# Patient Record
Sex: MALE | Race: WHITE | Employment: OTHER | URBAN - METROPOLITAN AREA
[De-identification: names, ages, dates, MRNs, and addresses within clinical notes are randomized per-mention and may not be internally consistent; named-entity substitution may affect disease eponyms.]

---

## 2017-06-22 ENCOUNTER — OFFICE VISIT (OUTPATIENT)
Dept: CARDIOLOGY CLINIC | Age: 82
End: 2017-06-22

## 2017-06-22 VITALS
HEIGHT: 67 IN | DIASTOLIC BLOOD PRESSURE: 88 MMHG | HEART RATE: 62 BPM | OXYGEN SATURATION: 98 % | WEIGHT: 225.8 LBS | BODY MASS INDEX: 35.44 KG/M2 | SYSTOLIC BLOOD PRESSURE: 140 MMHG | RESPIRATION RATE: 18 BRPM

## 2017-06-22 DIAGNOSIS — E78.00 HIGH CHOLESTEROL: Chronic | ICD-10-CM

## 2017-06-22 DIAGNOSIS — I48.0 PAF (PAROXYSMAL ATRIAL FIBRILLATION) (HCC): Primary | Chronic | ICD-10-CM

## 2017-06-22 DIAGNOSIS — I44.7 LBBB (LEFT BUNDLE BRANCH BLOCK): ICD-10-CM

## 2017-06-22 DIAGNOSIS — I10 HTN (HYPERTENSION), BENIGN: Chronic | ICD-10-CM

## 2017-06-22 NOTE — PROGRESS NOTES
Bryce Soler MD    [id number] [facility] [place], [address] [facility]    Office [phone],[facility] [phone]  Pager [phone]    Lois Postal is a 80 y.o. male is here for f/u visit - Afib    Primary care physician:  Jyoti Perla MD    Patient Active Problem List   Diagnosis Code    Unspecified hypothyroidism E03.9    HTN (hypertension), benign I10    Esophageal reflux K21.9    CAD (coronary artery disease) I25.10    Cardiomyopathy ([address] I42.9    GI bleed K92.2    Chronic anticoagulation Z79.01    PAF (paroxysmal atrial fibrillation) ([facility] I48.0    LBBB (left bundle branch block) I44.7    High cholesterol E78.00       Chief complaint:  Chief Complaint   Patient presents with    Irregular Heart Beat     afib       Assessment:    PAF - now Sinus Jericho Bartholomew - was on amiodaraone - now off it ( office visit 9/18/14)- \"pt not sure - thought he was supposed to come off it\". Was not restarted. He was on Eliquis but had a GI bleed in October 2016 (please see discussion below) and is now on aspirin. LBBB ( old)  Hx of NSVT  Non obstructive CAD by cath 5/2014  Cardiomyopathy -EF50%/DD- grade 1/ non ischemic - clinically euvolemic  Chronic dizziness - hx of BPV  \"Cool extremeties- admn 5/5/14 - seen by Dr Roberto Carlos Eagle 5/12/14 - SERAFIN - at ankle (Nml SERAFIN 1.3 bilat with PVR) - neg u/s for DVT and VQ scan\"  (Hx of Anemia w/u 5/5/14 - Dr Feroz Jean - Mild antral gastritis/ Mild sigmoid diverticulosis and small internal hemorrhoids, otherwise mucosa within normal. - per pt -anemia resolved-recurrent GI bleed October 2016. As per the discussion during the admission, patient did not want any further workup for his diverticulitis history/repeat procedures as part of the gastroenterologist note and it was recommended that because of the 25% risk of rebleeding if Eliquis was restarted, patient would be on aspirin alone.)            Plan:     Doing well. He will call me if he has any issues.   On ASA/ B Blocker/ACEI /statin  Daily wts  Aggressive CV risk factor modification  Lipids perPCP  F/u appt in 6 mths        I appreciate the opportunity to be involved in Mr. Ríos. See note below for details. Please do not hesitate to contact us with questions or concerns. Lavell Izaguirre MD    Cardiac Testing/ Procedures: A. Cardiac Cath/PCI: 5/9/14  L Main: MLI  LAD: Med to Large prox ; Mid and distal LAD - small to med; At D1 - tandem  50% lesions; D1 - small to med - MLI  LCflex: Large; MLI; OM1;OM2 - MLI  RCA: Subselective inj ( WRP catheter) - tortuous; MLI  LVEDP: 10  LVEF: 40%  No significant gradient across aortic valve. RHC findings:  RAP= 2 mmHg  RVSP= 31 mmHg  PAP= 15 mmHg  PCWP= 6 mmHg  CO= 5.61 l/min  CI=2.64  Closure Device: Manual          B.ECHO/KIRT:  12/8/15 Left ventricle: Systolic function was normal by EF (biplane method of  disks). Ejection fraction was estimated to be 50 %. There were no regional  wall motion abnormalities. Doppler parameters were consistent with  abnormal left ventricular relaxation (grade 1 diastolic dysfunction). Left atrium: The atrium was moderately dilated. Mitral valve: There was mild annular calcification. There was mild  regurgitation. Aortic valve: Normal valve structure. Leaflets exhibited mild  calcification and sclerosis. Tricuspid valve: There was moderate regurgitation. 9/12/14 Left ventricle: Systolic function was mildly reduced. Ejection fraction  was estimated to be 50 %. There was moderate diffuse hypokinesis. There  was severe hypokinesis of the basal-mid inferior wall(s). Doppler  parameters were consistent with abnormal left ventricular relaxation  (grade 1 diastolic dysfunction). Left atrium: The atrium was moderately to markedly dilated. Right atrium: The atrium was dilated. Mitral valve: There was mild annular calcification. There was mild  regurgitation. Tricuspid valve: There was mild regurgitation.         5/6/14 Left ventricle: Size was normal. Systolic function was markedly reduced. Ejection fraction was estimated to be 25 %. There was severe diffuse  hypokinesis with distinct regional wall motion abnormalities. Doppler  parameters were consistent with abnormal left ventricular relaxation  (grade 1 diastolic dysfunction). Right ventricle: The size was normal. Systolic function was normal.    Left atrium: The atrium was mildly dilated. Mitral valve: There was moderate annular calcification. There was mild  regurgitation. Regurgitation grade was 1+ on a scale of 0 to 4+. C.StressNuclear/Stress ECHO/Stress test:    D.Vascular:    E. EP:    F. Miscellaneous:    Subjective:  Yesi Gillis is a 80 y.o. male who returns for follow up visit. Patient has been doing well since his GI bleed in October 2016 when his Eliquis was stopped. As per the discussion during the admission, patient did not want any further workup for his diverticulitis history/repeat procedures as part of the gastroenterologist note and it was recommended that because of the 25% risk of rebleeding if Eliquis was restarted, patient would be on aspirin alone. Patient states that he has been doing well. No chest pain, dyspnea, melena, hematochezia, hematuria. He is now at [facility] and is in a cottage. His son owns a [employer] dealerships  and checks on him. Patient was  for 60+ years and  lost his wife in 2015. Yesi Gillis is a 66 y.o. male with a hx of htn, dyslipidemia, hypothyroidism, Gout and prostate cancer s/p prostatectomy admitted for worsened lethargy and dizziness to [facility] on 5/5/14. Ramona Boss Hx of  afib with RVR the pt was started on diltiazem but is remaining in the 120-130 range. EKG showed a LBBB in addition to afib. Stress test with Myoview on 1/2013 showed an EF of 62% with no evidence of ischemia. Hx of prior LBBB. Hx of chronic dizziness - BPV. (not assoc with afib).      ECHO showed EF of 25% ( clicnically well compensated) - underwent RHC/LHC - non obstructive CAD. Also had  NSVT. Seen by  [facility]. Started on Amiodarone and Eliquis. During office visit 9/2014 - was not taking amiodarone. Was in sinus erica and it was not restarted.)                   ROS:  (bold if positive, if negative)             Medications before admission:    Current Outpatient Prescriptions   Medication Sig Dispense    carvedilol (COREG) 25 mg tablet TAKE ONE TABLET BY MOUTH TWICE A DAY WITH MEALS  60 Tab    lisinopril (PRINIVIL, ZESTRIL) 2.5 mg tablet TAKE ONE TABLET BY MOUTH ONCE A DAY  90 Tab    ELIQUIS 5 mg tablet TAKE ONE TABLET BY MOUTH TWICE A DAY  30 Tab    levothyroxine (LEVOTHROID) 50 mcg tablet Take 50 mcg by mouth Daily (before breakfast).  calcium carbonate (TUMS) 200 mg calcium (500 mg) chew Take 2 Tabs by mouth daily.  omeprazole (PRILOSEC) 20 mg capsule Take 20 mg by mouth daily.  loratadine (CLARITIN) 10 mg tablet Take 10 mg by mouth daily.  polyethylene glycol (MIRALAX) 17 gram packet Take 17 g by mouth daily.  psyllium (METAMUCIL) packet Take 1 Packet by mouth daily.  memantine (NAMENDA) 10 mg tablet Take 10 mg by mouth two (2) times a day.  simvastatin (ZOCOR) 20 mg tablet Take 20 mg by mouth nightly.  aspirin 81 mg tablet Take 81 mg by mouth daily.  MULTIVITAMIN PO Take 1 Tab by mouth daily.     90 Tab    zolpidem (AMBIEN) 10 mg tablet Take 10 mg by mouth nightly. No current facility-administered medications for this visit. Physical Exam:  Visit Vitals    /88 (BP 1 Location: Left arm)    Pulse 62    Resp 18    Ht 5' 7\" (1.702 m)    Wt 225 lb 12.8 oz (102.4 kg)    SpO2 98%    BMI 35.37 kg/m2          Gen: Well-developed, well-nourished, in no acute distress  Neck: Supple,No JVD, No Carotid Bruit,   Resp: No accessory muscle use, Dec AE Bilat, No rales or rhonchi  Card: Regular Rate,Rythm,Normal S1, S2, No murmurs, rubs or gallop. Abd:  Soft, non-tender, non-distended,BS+,   MSK: No cyanosis  Neuro: moving all four extremities , follows commands appropriately  Psych:  Good insight, oriented to person, place , alert, Nml Affect  LE: No edema    EKG: Sinus bradycardia, left bundle branch block-present on prior EKGs      LABS:        Lab Results   Component Value Date/Time    WBC 6.9 10/04/2016 04:59 AM    HGB 10.8 10/04/2016 04:59 AM    HCT 31.3 10/04/2016 04:59 AM    PLATELET 128 08/39/6806 04:59 AM    MCV 89.7 10/04/2016 04:59 AM     Lab Results   Component Value Date/Time    Sodium 141 10/04/2016 04:59 AM    Potassium 3.5 10/04/2016 04:59 AM    Chloride 109 10/04/2016 04:59 AM    CO2 25 10/04/2016 04:59 AM    Anion gap 7 10/04/2016 04:59 AM    Glucose 100 10/04/2016 04:59 AM    BUN 8 10/04/2016 04:59 AM    Creatinine 1.03 10/04/2016 04:59 AM    BUN/Creatinine ratio 8 10/04/2016 04:59 AM    GFR est AA >60 10/04/2016 04:59 AM    GFR est non-AA >60 10/04/2016 04:59 AM    Calcium 8.4 10/04/2016 04:59 AM       Lab Results   Component Value Date/Time    aPTT 25.7 05/05/2014 10:30 AM     Lab Results   Component Value Date/Time    INR 1.1 10/02/2016 09:16 AM    INR 1.0 05/05/2014 10:30 AM    Prothrombin time 10.8 10/02/2016 09:16 AM    Prothrombin time 10.5 05/05/2014 10:30 AM     No components found for: Cailin Blandon MD

## 2017-06-22 NOTE — MR AVS SNAPSHOT
Visit Information Date & Time Provider Department Dept. Phone Encounter #  
 6/22/2017 11:20 AM Rocky Guzman MD [facility] [phone] [id number] Your Appointments 12/22/2017  9:00 AM  
ESTABLISHED PATIENT with Rocky Guzman MD  
[facility] ([address]) Appt Note: 6 MO FUP  
 [address] [address] [facility] [address] [address] Upcoming Health Maintenance Date Due DTaP/Tdap/Td series (1 - Tdap) 7/10/1956 ZOSTER VACCINE AGE 60> 7/10/1995 GLAUCOMA SCREENING Q2Y 7/10/2000 Pneumococcal 65+ Low/Medium Risk (1 of 2 - PCV13) 7/10/2000 MEDICARE YEARLY EXAM 7/10/2000 INFLUENZA AGE 9 TO ADULT 8/1/2017 Allergies as of 6/22/2017  Review Complete On: 6/22/2017 By: Mayte Woods No Known Allergies Current Immunizations  Reviewed on 5/10/2014 No immunizations on file. Not reviewed this visit You Were Diagnosed With   
  
 Codes Comments PAF (paroxysmal atrial fibrillation) ([facility]    -  Primary ICD-10-CM: I48.0 ICD-9-CM: 427.31 Vitals BP Pulse Resp Height(growth percentile) Weight(growth percentile) SpO2  
 140/88 (BP 1 Location: Left arm) 62 18 5' 7\" (1.702 m) 225 lb 12.8 oz (102.4 kg) 98% BMI Smoking Status 35.37 kg/m2 Former Smoker Vitals History BMI and BSA Data Body Mass Index Body Surface Area  
 35.37 kg/m 2 2.2 m 2 Preferred Pharmacy Pharmacy Name Phone [facility], [address] [phone] Your Updated Medication List  
  
   
This list is accurate as of: 6/22/17 12:00 PM.  Always use your most recent med list.  
  
  
  
  
 aspirin 81 mg tablet Take 81 mg by mouth nightly. CALCIUM 500 PO Take 1 Tab by mouth daily. carvedilol 25 mg tablet Commonly known as:  COREG  
TAKE ONE TABLET BY MOUTH TWICE A DAY WITH MEALS  
  
 CLARITIN 10 mg tablet Generic drug:  loratadine Take 10 mg by mouth daily. levothyroxine 75 mcg tablet Commonly known as:  SYNTHROID Take 75 mcg by mouth Daily (before breakfast). lisinopril 20 mg tablet Commonly known as:  Gil Apo Take 20 mg by mouth daily. memantine ER 28 mg capsule Commonly known as:  NAMENDA XR Take 28 mg by mouth daily. MIRALAX 17 gram packet Generic drug:  polyethylene glycol Take 17 g by mouth daily. MULTIVITAMIN PO Take 1 Tab by mouth daily. psyllium packet Commonly known as:  METAMUCIL Take 1 Packet by mouth daily. traZODone 50 mg tablet Commonly known as:  Merleen Cables Take 50 mg by mouth nightly. VITAMIN D3 1,000 unit Cap Generic drug:  cholecalciferol Take 1,000 Units by mouth daily. ZOCOR 20 mg tablet Generic drug:  simvastatin Take 20 mg by mouth nightly. We Performed the Following AMB POC EKG ROUTINE W/ 12 LEADS, INTER & REP [02808 CPT(R)] Introducing \[facility]\"" & [facility] SERVICES! Dear Robbin Anguiano: Thank you for requesting a [employer] account. Our records indicate that you already have an active [employer] account. You can access your account anytime at [email or web address] Did you know that you can access your hospital and ER discharge instructions at any time in [employer]? You can also review all of your test results from your hospital stay or ER visit. Additional Information If you have questions, please visit the Frequently Asked Questions section of the [employer] website at [email or web address] Remember, [employer] is NOT to be used for urgent needs. For medical emergencies, dial 911. Now available from your iPhone and Android! Please provide this summary of care documentation to your next provider. Your primary care clinician is listed as Fernando Pineda. If you have any questions after today's visit, please call [phone].

## 2017-08-07 ENCOUNTER — HOSPITAL ENCOUNTER (OUTPATIENT)
Dept: GENERAL RADIOLOGY | Age: 82
Discharge: HOME | End: 2017-08-07
Payer: MEDICARE

## 2017-08-07 DIAGNOSIS — R05.9 COUGH: ICD-10-CM

## 2017-08-07 PROCEDURE — 71020 XR CHEST PA LAT: CPT

## 2017-12-22 ENCOUNTER — OFFICE VISIT (OUTPATIENT)
Dept: CARDIOLOGY CLINIC | Age: 82
End: 2017-12-22

## 2017-12-22 VITALS
HEART RATE: 56 BPM | HEIGHT: 67 IN | OXYGEN SATURATION: 98 % | RESPIRATION RATE: 16 BRPM | BODY MASS INDEX: 36.54 KG/M2 | WEIGHT: 232.8 LBS | SYSTOLIC BLOOD PRESSURE: 122 MMHG | DIASTOLIC BLOOD PRESSURE: 72 MMHG

## 2017-12-22 DIAGNOSIS — E78.00 HIGH CHOLESTEROL: Chronic | ICD-10-CM

## 2017-12-22 DIAGNOSIS — I44.7 LBBB (LEFT BUNDLE BRANCH BLOCK): ICD-10-CM

## 2017-12-22 DIAGNOSIS — I10 HTN (HYPERTENSION), BENIGN: Chronic | ICD-10-CM

## 2017-12-22 DIAGNOSIS — I25.10 CORONARY ARTERY DISEASE INVOLVING NATIVE CORONARY ARTERY OF NATIVE HEART WITHOUT ANGINA PECTORIS: Chronic | ICD-10-CM

## 2017-12-22 DIAGNOSIS — I48.0 PAF (PAROXYSMAL ATRIAL FIBRILLATION) (HCC): Primary | Chronic | ICD-10-CM

## 2017-12-22 RX ORDER — LOSARTAN POTASSIUM 50 MG/1
TABLET ORAL DAILY
COMMUNITY
End: 2019-04-26 | Stop reason: DRUGHIGH

## 2017-12-22 RX ORDER — FAMOTIDINE 20 MG/1
20 TABLET, FILM COATED ORAL 2 TIMES DAILY
COMMUNITY

## 2017-12-22 RX ORDER — BENZONATATE 100 MG/1
100 CAPSULE ORAL AS NEEDED
COMMUNITY
End: 2018-12-13

## 2017-12-22 NOTE — MR AVS SNAPSHOT
Visit Information Date & Time Provider Department Dept. Phone Encounter #  
 12/22/2017  9:00 AM Maty Harris MD [facility] [phone] [id number] Upcoming Health Maintenance Date Due DTaP/Tdap/Td series (1 - Tdap) 7/10/1956 ZOSTER VACCINE AGE 60> 5/10/1995 GLAUCOMA SCREENING Q2Y 7/10/2000 Pneumococcal 65+ Low/Medium Risk (1 of 2 - PCV13) 7/10/2000 MEDICARE YEARLY EXAM 7/10/2000 Influenza Age 5 to Adult 8/1/2017 Allergies as of 12/22/2017  Review Complete On: 12/22/2017 By: Irby Lanes No Known Allergies Current Immunizations  Reviewed on 5/10/2014 No immunizations on file. Not reviewed this visit You Were Diagnosed With   
  
 Codes Comments PAF (paroxysmal atrial fibrillation) ([facility]    -  Primary ICD-10-CM: I48.0 ICD-9-CM: 427.31 Vitals BP Pulse Resp Height(growth percentile) Weight(growth percentile) SpO2  
 122/72 (BP 1 Location: Left arm) (!) 56 16 5' 7\" (1.702 m) 232 lb 12.8 oz (105.6 kg) 98% BMI Smoking Status 36.46 kg/m2 Former Smoker Vitals History BMI and BSA Data Body Mass Index Body Surface Area  
 36.46 kg/m 2 2.23 m 2 Preferred Pharmacy Pharmacy Name Phone [facility], [address] [phone] Your Updated Medication List  
  
   
This list is accurate as of: 12/22/17  9:29 AM.  Always use your most recent med list.  
  
  
  
  
 aspirin 81 mg tablet Take 81 mg by mouth nightly. benzonatate 100 mg capsule Commonly known as:  TESSALON Take 100 mg by mouth as needed for Cough. CALCIUM 500 PO Take 1 Tab by mouth daily. carvedilol 25 mg tablet Commonly known as:  COREG  
TAKE ONE TABLET BY MOUTH TWICE A DAY WITH MEALS  
  
 CLARITIN 10 mg tablet Generic drug:  loratadine Take 10 mg by mouth daily. levothyroxine 75 mcg tablet Commonly known as:  SYNTHROID Take 75 mcg by mouth Daily (before breakfast). lisinopril 20 mg tablet Commonly known as:  Sly Hu Take 20 mg by mouth daily. losartan 50 mg tablet Commonly known as:  COZAAR Take  by mouth daily. memantine ER 28 mg capsule Commonly known as:  NAMENDA XR Take 28 mg by mouth daily. MIRALAX 17 gram packet Generic drug:  polyethylene glycol Take 17 g by mouth daily. MULTIVITAMIN PO Take 1 Tab by mouth daily. PEPCID 20 mg tablet Generic drug:  famotidine Take 20 mg by mouth two (2) times a day. psyllium packet Commonly known as:  METAMUCIL Take 1 Packet by mouth daily. traZODone 50 mg tablet Commonly known as:  Bonilla Cashton Take 50 mg by mouth nightly. VITAMIN D3 1,000 unit Cap Generic drug:  cholecalciferol Take 1,000 Units by mouth daily. ZOCOR 20 mg tablet Generic drug:  simvastatin Take 20 mg by mouth nightly. We Performed the Following AMB POC EKG ROUTINE W/ 12 LEADS, INTER & REP [16399 CPT(R)] Introducing hospitals & [facility] SERVICES! Dear Reagan Samuels: Thank you for requesting a [employer] account. Our records indicate that you already have an active [employer] account. You can access your account anytime at [email or web address] Did you know that you can access your hospital and ER discharge instructions at any time in [employer]? You can also review all of your test results from your hospital stay or ER visit. Additional Information If you have questions, please visit the Frequently Asked Questions section of the [employer] website at [email or web address] Remember, [employer] is NOT to be used for urgent needs. For medical emergencies, dial 911. Now available from your iPhone and Android! Please provide this summary of care documentation to your next provider. Your primary care clinician is listed as Mariella Soler. If you have any questions after today's visit, please call [phone].

## 2017-12-22 NOTE — PROGRESS NOTES
Sharri Fierro MD    [address] [place], [address] [facility] (formerly [facility])    Office [phone],CUR [phone]  Pager [phone]    Alexandrea Baker is a 80 y.o. male is here for f/u visit - Afib    Primary care physician:  Iman Murillo MD    Patient Active Problem List   Diagnosis Code    Unspecified hypothyroidism E03.9    HTN (hypertension), benign I10    Esophageal reflux K21.9    CAD (coronary artery disease) I25.10    Cardiomyopathy ([facility] [address] I42.9    GI bleed K92.2    Chronic anticoagulation Z79.01    PAF (paroxysmal atrial fibrillation) ([facility] [address] I48.0    LBBB (left bundle branch block) I44.7    High cholesterol E78.00       Chief complaint:  Chief Complaint   Patient presents with    Irregular Heart Beat     afib       Assessment:    PAF - now Sinus Minal Marion - was on amiodaraone - now off it ( office visit 9/18/14)- \"pt not sure - thought he was supposed to come off it\". Was not restarted. He was on Eliquis but had a GI bleed in October 2016 (please see discussion below) and is now on aspirin. LBBB ( old)  Hx of NSVT  Non obstructive CAD by cath 5/2014  Cardiomyopathy -EF50%/DD- grade 1/ non ischemic - clinically euvolemic  Chronic dizziness - hx of BPV  \"Cool extremeties- admn 5/5/14 - seen by Dr Zach Chow 5/12/14 - SERAFIN - at ankle (Nml SERAFIN 1.3 bilat with PVR) - neg u/s for DVT and VQ scan\"  (Hx of Anemia w/u 5/5/14 - Dr Emily Lo - Mild antral gastritis/ Mild sigmoid diverticulosis and small internal hemorrhoids, otherwise mucosa within normal. - per pt -anemia resolved-recurrent GI bleed October 2016. As per the discussion during the admission, patient did not want any further workup for his diverticulitis history/repeat procedures as part of the gastroenterologist note and it was recommended that because of the 25% risk of rebleeding if Eliquis was restarted, patient would be on aspirin alone.)            Plan:     Doing well.    On ASA/ B Blocker/ACEI /statin  Daily wts  Aggressive CV risk factor modification  Lipids perPCP  F/u appt in 12 mths/prn        I appreciate the opportunity to be involved in Mr. Ríos. See note below for details. Please do not hesitate to contact us with questions or concerns. Maurisio Landin MD    Cardiac Testing/ Procedures: A. Cardiac Cath/PCI: 5/9/14  L Main: MLI  LAD: Med to Large prox ; Mid and distal LAD - small to med; At D1 - tandem  50% lesions; D1 - small to med - MLI  LCflex: Large; MLI; OM1;OM2 - MLI  RCA: Subselective inj ( WRP catheter) - tortuous; MLI  LVEDP: 10  LVEF: 40%  No significant gradient across aortic valve. RHC findings:  RAP= 2 mmHg  RVSP= 31 mmHg  PAP= 15 mmHg  PCWP= 6 mmHg  CO= 5.61 l/min  CI=2.64  Closure Device: Manual          B.ECHO/KIRT:  12/8/15 Left ventricle: Systolic function was normal by EF (biplane method of  disks). Ejection fraction was estimated to be 50 %. There were no regional  wall motion abnormalities. Doppler parameters were consistent with  abnormal left ventricular relaxation (grade 1 diastolic dysfunction). Left atrium: The atrium was moderately dilated. Mitral valve: There was mild annular calcification. There was mild  regurgitation. Aortic valve: Normal valve structure. Leaflets exhibited mild  calcification and sclerosis. Tricuspid valve: There was moderate regurgitation. 9/12/14 Left ventricle: Systolic function was mildly reduced. Ejection fraction  was estimated to be 50 %. There was moderate diffuse hypokinesis. There  was severe hypokinesis of the basal-mid inferior wall(s). Doppler  parameters were consistent with abnormal left ventricular relaxation  (grade 1 diastolic dysfunction). Left atrium: The atrium was moderately to markedly dilated. Right atrium: The atrium was dilated. Mitral valve: There was mild annular calcification. There was mild  regurgitation. Tricuspid valve: There was mild regurgitation.         5/6/14 Left ventricle: Size was normal. Systolic function was markedly reduced. Ejection fraction was estimated to be 25 %. There was severe diffuse  hypokinesis with distinct regional wall motion abnormalities. Doppler  parameters were consistent with abnormal left ventricular relaxation  (grade 1 diastolic dysfunction). Right ventricle: The size was normal. Systolic function was normal.    Left atrium: The atrium was mildly dilated. Mitral valve: There was moderate annular calcification. There was mild  regurgitation. Regurgitation grade was 1+ on a scale of 0 to 4+. C.StressNuclear/Stress ECHO/Stress test:    D.Vascular:    E. EP:    F. Miscellaneous:    Subjective:  Amandeep Spencer is a 80 y.o. male who returns for follow up visit. (s GI bleed in October 2016 when his Eliquis was stopped. As per the discussion during the admission, patient did not want any further workup for his diverticulitis history/repeat procedures as part of the gastroenterologist note and it was recommended that because of the 25% risk of rebleeding if Eliquis was restarted, patient would be on aspirin alone. Patient states that he has been doing well. No chest pain, dyspnea, melena, hematochezia, hematuria. He is now at [facility] and is in a cottage. His son owns a [employer]  and checks on him. Patient was  for 60+ years and  lost his wife in 2015.)patrizia Spencer is a 66 y.o. male with a hx of htn, dyslipidemia, hypothyroidism, Gout and prostate cancer s/p prostatectomy admitted for worsened lethargy and dizziness to [facility] on 5/5/14. Daya Cole Hx of  afib with RVR the pt was started on diltiazem but is remaining in the 120-130 range. EKG showed a LBBB in addition to afib. Stress test with Myoview on 1/2013 showed an EF of 62% with no evidence of ischemia. Hx of prior LBBB. Hx of chronic dizziness - BPV. (not assoc with afib). ECHO showed EF of 87% (  clicnically well compensated) - underwent RHC/LHC - non obstructive CAD.   Also had NSVT. Seen by  [facility]. Started on Amiodarone and Eliquis. During office visit 9/2014 - was not taking amiodarone. Was in sinus erica and it was not restarted.)                   ROS:  (bold if positive, if negative)             Medications before admission:    Current Outpatient Prescriptions   Medication Sig Dispense    carvedilol (COREG) 25 mg tablet TAKE ONE TABLET BY MOUTH TWICE A DAY WITH MEALS  60 Tab    lisinopril (PRINIVIL, ZESTRIL) 2.5 mg tablet TAKE ONE TABLET BY MOUTH ONCE A DAY  90 Tab    ELIQUIS 5 mg tablet TAKE ONE TABLET BY MOUTH TWICE A DAY  30 Tab    levothyroxine (LEVOTHROID) 50 mcg tablet Take 50 mcg by mouth Daily (before breakfast).  calcium carbonate (TUMS) 200 mg calcium (500 mg) chew Take 2 Tabs by mouth daily.  omeprazole (PRILOSEC) 20 mg capsule Take 20 mg by mouth daily.  loratadine (CLARITIN) 10 mg tablet Take 10 mg by mouth daily.  polyethylene glycol (MIRALAX) 17 gram packet Take 17 g by mouth daily.  psyllium (METAMUCIL) packet Take 1 Packet by mouth daily.  memantine (NAMENDA) 10 mg tablet Take 10 mg by mouth two (2) times a day.  simvastatin (ZOCOR) 20 mg tablet Take 20 mg by mouth nightly.  aspirin 81 mg tablet Take 81 mg by mouth daily.  MULTIVITAMIN PO Take 1 Tab by mouth daily.     90 Tab    zolpidem (AMBIEN) 10 mg tablet Take 10 mg by mouth nightly. No current facility-administered medications for this visit. Physical Exam:  Visit Vitals    /72 (BP 1 Location: Left arm)    Pulse (!) 56    Resp 16    Ht 5' 7\" (1.702 m)    Wt 232 lb 12.8 oz (105.6 kg)    SpO2 98%    BMI 36.46 kg/m2          Gen: Well-developed, well-nourished, in no acute distress  Neck: Supple,No JVD, No Carotid Bruit,   Resp: No accessory muscle use, Dec AE Bilat, No rales or rhonchi  Card: Regular Rate,Rythm,Normal S1, S2, No murmurs, rubs or gallop.    Abd:  Soft, non-tender, non-distended,BS+,   MSK: No cyanosis  Neuro: moving all four extremities , follows commands appropriately  Psych:  Good insight, oriented to person, place , alert, Nml Affect  LE: No edema    EKG: Sinus bradycardia, left bundle branch block-present on prior EKGs      LABS:        Lab Results   Component Value Date/Time    WBC 6.9 10/04/2016 04:59 AM    HGB 10.8 10/04/2016 04:59 AM    HCT 31.3 10/04/2016 04:59 AM    PLATELET 918 61/28/8319 04:59 AM    MCV 89.7 10/04/2016 04:59 AM     Lab Results   Component Value Date/Time    Sodium 141 10/04/2016 04:59 AM    Potassium 3.5 10/04/2016 04:59 AM    Chloride 109 10/04/2016 04:59 AM    CO2 25 10/04/2016 04:59 AM    Anion gap 7 10/04/2016 04:59 AM    Glucose 100 10/04/2016 04:59 AM    BUN 8 10/04/2016 04:59 AM    Creatinine 1.03 10/04/2016 04:59 AM    BUN/Creatinine ratio 8 10/04/2016 04:59 AM    GFR est AA >60 10/04/2016 04:59 AM    GFR est non-AA >60 10/04/2016 04:59 AM    Calcium 8.4 10/04/2016 04:59 AM       Lab Results   Component Value Date/Time    aPTT 25.7 05/05/2014 10:30 AM     Lab Results   Component Value Date/Time    INR 1.1 10/02/2016 09:16 AM    INR 1.0 05/05/2014 10:30 AM    Prothrombin time 10.8 10/02/2016 09:16 AM    Prothrombin time 10.5 05/05/2014 10:30 AM     No components found for: Princess Dietrich MD

## 2018-12-13 ENCOUNTER — HOSPITAL ENCOUNTER (OUTPATIENT)
Dept: LAB | Age: 83
Discharge: HOME | End: 2018-12-13
Payer: MEDICARE

## 2018-12-13 ENCOUNTER — OFFICE VISIT (OUTPATIENT)
Dept: CARDIOLOGY CLINIC | Age: 83
End: 2018-12-13

## 2018-12-13 VITALS
HEART RATE: 67 BPM | WEIGHT: 225.8 LBS | SYSTOLIC BLOOD PRESSURE: 122 MMHG | HEIGHT: 67 IN | OXYGEN SATURATION: 97 % | BODY MASS INDEX: 35.44 KG/M2 | DIASTOLIC BLOOD PRESSURE: 80 MMHG

## 2018-12-13 DIAGNOSIS — I44.7 LBBB (LEFT BUNDLE BRANCH BLOCK): ICD-10-CM

## 2018-12-13 DIAGNOSIS — E78.00 HIGH CHOLESTEROL: ICD-10-CM

## 2018-12-13 DIAGNOSIS — I25.119 CORONARY ARTERY DISEASE INVOLVING NATIVE CORONARY ARTERY OF NATIVE HEART WITH ANGINA PECTORIS (HCC): Primary | ICD-10-CM

## 2018-12-13 DIAGNOSIS — I48.0 PAF (PAROXYSMAL ATRIAL FIBRILLATION) (HCC): Chronic | ICD-10-CM

## 2018-12-13 DIAGNOSIS — R06.00 DYSPNEA, UNSPECIFIED TYPE: ICD-10-CM

## 2018-12-13 DIAGNOSIS — R07.9 CHEST PAIN, UNSPECIFIED TYPE: ICD-10-CM

## 2018-12-13 DIAGNOSIS — I10 HTN (HYPERTENSION), BENIGN: ICD-10-CM

## 2018-12-13 DIAGNOSIS — I25.10 CORONARY ARTERY DISEASE INVOLVING NATIVE CORONARY ARTERY OF NATIVE HEART WITHOUT ANGINA PECTORIS: Chronic | ICD-10-CM

## 2018-12-13 PROCEDURE — 85025 COMPLETE CBC W/AUTO DIFF WBC: CPT

## 2018-12-13 PROCEDURE — 80048 BASIC METABOLIC PNL TOTAL CA: CPT

## 2018-12-13 PROCEDURE — 36415 COLL VENOUS BLD VENIPUNCTURE: CPT

## 2018-12-13 PROCEDURE — 83880 ASSAY OF NATRIURETIC PEPTIDE: CPT

## 2018-12-13 NOTE — PROGRESS NOTES
Patient having chest pain lately   Patient having some shortness of breath     Visit Vitals  /80 (BP 1 Location: Right arm, BP Patient Position: Sitting)   Pulse 67   Ht 5' 7\" (1.702 m)   Wt 225 lb 12.8 oz (102.4 kg)   SpO2 97%   BMI 35.37 kg/m²

## 2018-12-13 NOTE — PROGRESS NOTES
Yasmany Barragan MD    [id number] [facility] [place], [address] [facility]    Office [phone],Count includes the [facility] [phone]  Pager [phone]    Jaswant Zee is a 80 y.o. male is here for f/u visit - Afib    Primary care physician:  Courtney Stevenson MD    Patient Active Problem List   Diagnosis Code    Unspecified hypothyroidism E03.9    HTN (hypertension), benign I10    Esophageal reflux K21.9    CAD (coronary artery disease) I25.10    Cardiomyopathy ([address] I42.9    GI bleed K92.2    Chronic anticoagulation Z79.01    PAF (paroxysmal atrial fibrillation) ([facility]) I48.0    LBBB (left bundle branch block) I44.7    High cholesterol E78.00       Chief complaint:  Chief Complaint   Patient presents with    Irregular Heart Beat    Coronary Artery Disease    Hypertension       Assessment:  Chest pain, fatigue  PAF - now Sinus Alison Brand - was on amiodaraone - now off it ( office visit 9/18/14)- \"pt not sure - thought he was supposed to come off it\". Was not restarted. He was on Eliquis but had a GI bleed in October 2016 (please see discussion below) and is now on aspirin. LBBB ( old)  Hx of NSVT  Non obstructive CAD by cath 5/2014  Cardiomyopathy -EF50%/DD- grade 1/ non ischemic - clinically euvolemic  Chronic dizziness - hx of BPV  \"Cool extremeties- admn 5/5/14 - seen by Dr Lyudmila Mckinney 5/12/14 - SERAFIN - at ankle (Nml SERAFIN 1.3 bilat with PVR) - neg u/s for DVT and VQ scan\"  (Hx of Anemia w/u 5/5/14 - Dr Coral Chen - Mild antral gastritis/ Mild sigmoid diverticulosis and small internal hemorrhoids, otherwise mucosa within normal. - per pt -anemia resolved-recurrent GI bleed October 2016. As per the discussion during the admission, patient did not want any further workup for his diverticulitis history/repeat procedures as part of the gastroenterologist note and it was recommended that because of the 25% risk of rebleeding if Eliquis was restarted, patient would be on aspirin alone.)            Plan:   Stress nuclear study.  Echocardiogram.  On ASA/ B Blocker/ACEI /statin  Daily wts  Aggressive CV risk factor modification  Lipids perPCP  F/u appt in 4-6 weeks/prn        I appreciate the opportunity to be involved in Mr. Ríos. See note below for details. Please do not hesitate to contact us with questions or concerns. ATTENTION:   This medical record was transcribed using an electronic medical records/speech recognition system. Although proofread, it may and can contain electronic, spelling and other errors. Corrections may be executed at a later time. Please feel free to contact us for any clarifications as needed. Prakash Osullivan MD    Cardiac Testing/ Procedures: A. Cardiac Cath/PCI: 5/9/14  L Main: MLI  LAD: Med to Large prox ; Mid and distal LAD - small to med; At D1 - tandem  50% lesions; D1 - small to med - MLI  LCflex: Large; MLI; OM1;OM2 - MLI  RCA: Subselective inj ( WRP catheter) - tortuous; MLI  LVEDP: 10  LVEF: 40%  No significant gradient across aortic valve. RHC findings:  RAP= 2 mmHg  RVSP= 31 mmHg  PAP= 15 mmHg  PCWP= 6 mmHg  CO= 5.61 l/min  CI=2.64  Closure Device: Manual          B.ECHO/KIRT:  12/8/15 Left ventricle: Systolic function was normal by EF (biplane method of  disks). Ejection fraction was estimated to be 50 %. There were no regional  wall motion abnormalities. Doppler parameters were consistent with  abnormal left ventricular relaxation (grade 1 diastolic dysfunction). Left atrium: The atrium was moderately dilated. Mitral valve: There was mild annular calcification. There was mild  regurgitation. Aortic valve: Normal valve structure. Leaflets exhibited mild  calcification and sclerosis. Tricuspid valve: There was moderate regurgitation. 9/12/14 Left ventricle: Systolic function was mildly reduced. Ejection fraction  was estimated to be 50 %. There was moderate diffuse hypokinesis. There  was severe hypokinesis of the basal-mid inferior wall(s). Doppler  parameters were consistent with abnormal left ventricular relaxation  (grade 1 diastolic dysfunction). Left atrium: The atrium was moderately to markedly dilated. Right atrium: The atrium was dilated. Mitral valve: There was mild annular calcification. There was mild  regurgitation. Tricuspid valve: There was mild regurgitation. 5/6/14 Left ventricle: Size was normal. Systolic function was markedly reduced. Ejection fraction was estimated to be 25 %. There was severe diffuse  hypokinesis with distinct regional wall motion abnormalities. Doppler  parameters were consistent with abnormal left ventricular relaxation  (grade 1 diastolic dysfunction). Right ventricle: The size was normal. Systolic function was normal.    Left atrium: The atrium was mildly dilated. Mitral valve: There was moderate annular calcification. There was mild  regurgitation. Regurgitation grade was 1+ on a scale of 0 to 4+. C.StressNuclear/Stress ECHO/Stress test:    D.Vascular:    E. EP:    F. Miscellaneous:    Subjective:  Koffi Quiñones is a 80 y.o. male who returns for follow up visit. He states that he has been having intermittent chest tightness or bandlike sensation across his chest over the past few days. Mild chest pain. (s GI bleed in October 2016 when his Eliquis was stopped. As per the discussion during the admission, patient did not want any further workup for his diverticulitis history/repeat procedures as part of the gastroenterologist note and it was recommended that because of the 25% risk of rebleeding if Eliquis was restarted, patient would be on aspirin alone. He is at [facility] and is in a cottage. His son owns a [employer]  and checks on him.   Patient was  for 60+ years and  lost his wife in 2015.)        (Koffi Quiñones is a 66 y.o. male with a hx of htn, dyslipidemia, hypothyroidism, Gout and prostate cancer s/p prostatectomy admitted for worsened lethargy and dizziness to [facility] on 5/5/14. Linnea Harris Hx of  afib with RVR the pt was started on diltiazem but is remaining in the 120-130 range. EKG showed a LBBB in addition to afib. Stress test with Myoview on 1/2013 showed an EF of 62% with no evidence of ischemia. Hx of prior LBBB. Hx of chronic dizziness - BPV. (not assoc with afib). ECHO showed EF of 16% (  clicnically well compensated) - underwent RHC/LHC - non obstructive CAD. Also had  NSVT. Seen by  [facility]. Started on Amiodarone and Eliquis. During office visit 9/2014 - was not taking amiodarone. Was in sinus erica and it was not restarted.)                   ROS:  (bold if positive, if negative)             Medications before admission:    Current Outpatient Prescriptions   Medication Sig Dispense    carvedilol (COREG) 25 mg tablet TAKE ONE TABLET BY MOUTH TWICE A DAY WITH MEALS  60 Tab    lisinopril (PRINIVIL, ZESTRIL) 2.5 mg tablet TAKE ONE TABLET BY MOUTH ONCE A DAY  90 Tab    ELIQUIS 5 mg tablet TAKE ONE TABLET BY MOUTH TWICE A DAY  30 Tab    levothyroxine (LEVOTHROID) 50 mcg tablet Take 50 mcg by mouth Daily (before breakfast).  calcium carbonate (TUMS) 200 mg calcium (500 mg) chew Take 2 Tabs by mouth daily.  omeprazole (PRILOSEC) 20 mg capsule Take 20 mg by mouth daily.  loratadine (CLARITIN) 10 mg tablet Take 10 mg by mouth daily.  polyethylene glycol (MIRALAX) 17 gram packet Take 17 g by mouth daily.  psyllium (METAMUCIL) packet Take 1 Packet by mouth daily.  memantine (NAMENDA) 10 mg tablet Take 10 mg by mouth two (2) times a day.  simvastatin (ZOCOR) 20 mg tablet Take 20 mg by mouth nightly.  aspirin 81 mg tablet Take 81 mg by mouth daily.  MULTIVITAMIN PO Take 1 Tab by mouth daily.     90 Tab    zolpidem (AMBIEN) 10 mg tablet Take 10 mg by mouth nightly. No current facility-administered medications for this visit.        Physical Exam:  Visit Vitals  /80 (BP 1 Location: Right arm, BP Patient Position: Sitting)   Pulse 67   Ht 5' 7\" (1.702 m)   Wt 225 lb 12.8 oz (102.4 kg)   SpO2 97%   BMI 35.37 kg/m²          Gen: Well-developed, well-nourished, in no acute distress  Neck: Supple,No JVD, No Carotid Bruit,   Resp: No accessory muscle use, Dec AE Bilat, No rales or rhonchi  Card: Regular Rate,Rythm,Normal S1, S2, No murmurs, rubs or gallop.    Abd:  Soft, non-tender, non-distended,BS+,   MSK: No cyanosis  Neuro: moving all four extremities , follows commands appropriately  Psych:  Good insight, oriented to person, place , alert, Nml Affect  LE: No edema    EKG: Sinus bradycardia, left bundle branch block-present on prior EKGs      LABS:        Lab Results   Component Value Date/Time    WBC 9.9 12/13/2018 04:27 PM    HGB 15.8 12/13/2018 04:27 PM    HCT 47.1 12/13/2018 04:27 PM    PLATELET 487 20/07/6817 04:27 PM    MCV 91 12/13/2018 04:27 PM     Lab Results   Component Value Date/Time    Sodium 143 12/13/2018 04:27 PM    Potassium 5.1 12/13/2018 04:27 PM    Chloride 105 12/13/2018 04:27 PM    CO2 24 12/13/2018 04:27 PM    Anion gap 7 10/04/2016 04:59 AM    Glucose 84 12/13/2018 04:27 PM    BUN 22 12/13/2018 04:27 PM    Creatinine 1.24 12/13/2018 04:27 PM    BUN/Creatinine ratio 18 12/13/2018 04:27 PM    GFR est AA 62 12/13/2018 04:27 PM    GFR est non-AA 53 (L) 12/13/2018 04:27 PM    Calcium 9.3 12/13/2018 04:27 PM       Lab Results   Component Value Date/Time    aPTT 25.7 05/05/2014 10:30 AM     Lab Results   Component Value Date/Time    INR 1.1 10/02/2016 09:16 AM    INR 1.0 05/05/2014 10:30 AM    Prothrombin time 10.8 10/02/2016 09:16 AM    Prothrombin time 10.5 05/05/2014 10:30 AM     No components found for: Jackson Hanson MD

## 2018-12-14 LAB
BASOPHILS # BLD AUTO: 0 X10E3/UL (ref 0–0.2)
BASOPHILS NFR BLD AUTO: 0 %
BUN SERPL-MCNC: 22 MG/DL (ref 8–27)
BUN/CREAT SERPL: 18 (ref 10–24)
CALCIUM SERPL-MCNC: 9.3 MG/DL (ref 8.6–10.2)
CHLORIDE SERPL-SCNC: 105 MMOL/L (ref 96–106)
CO2 SERPL-SCNC: 24 MMOL/L (ref 20–29)
CREAT SERPL-MCNC: 1.24 MG/DL (ref 0.76–1.27)
EOSINOPHIL # BLD AUTO: 0.2 X10E3/UL (ref 0–0.4)
EOSINOPHIL NFR BLD AUTO: 2 %
ERYTHROCYTE [DISTWIDTH] IN BLOOD BY AUTOMATED COUNT: 14.9 % (ref 12.3–15.4)
GLUCOSE SERPL-MCNC: 84 MG/DL (ref 65–99)
HCT VFR BLD AUTO: 47.1 % (ref 37.5–51)
HGB BLD-MCNC: 15.8 G/DL (ref 13–17.7)
IMM GRANULOCYTES # BLD: 0 X10E3/UL (ref 0–0.1)
IMM GRANULOCYTES NFR BLD: 0 %
INTERPRETATION: NORMAL
LYMPHOCYTES # BLD AUTO: 2.6 X10E3/UL (ref 0.7–3.1)
LYMPHOCYTES NFR BLD AUTO: 27 %
MCH RBC QN AUTO: 30.6 PG (ref 26.6–33)
MCHC RBC AUTO-ENTMCNC: 33.5 G/DL (ref 31.5–35.7)
MCV RBC AUTO: 91 FL (ref 79–97)
MONOCYTES # BLD AUTO: 1.2 X10E3/UL (ref 0.1–0.9)
MONOCYTES NFR BLD AUTO: 12 %
NEUTROPHILS # BLD AUTO: 5.9 X10E3/UL (ref 1.4–7)
NEUTROPHILS NFR BLD AUTO: 59 %
NT-PROBNP SERPL-MCNC: 467 PG/ML (ref 0–486)
PLATELET # BLD AUTO: 171 X10E3/UL (ref 150–379)
POTASSIUM SERPL-SCNC: 5.1 MMOL/L (ref 3.5–5.2)
RBC # BLD AUTO: 5.17 X10E6/UL (ref 4.14–5.8)
SODIUM SERPL-SCNC: 143 MMOL/L (ref 134–144)
WBC # BLD AUTO: 9.9 X10E3/UL (ref 3.4–10.8)

## 2018-12-17 ENCOUNTER — TELEPHONE (OUTPATIENT)
Dept: CARDIOLOGY CLINIC | Age: 83
End: 2018-12-17

## 2018-12-17 NOTE — TELEPHONE ENCOUNTER
----- Message from Charbel Rhodes MD sent at 12/14/2018 10:38 AM EST -----  Reviewed recent lab work. Looks normal except for very mildly abnormal kidney function.

## 2019-01-11 ENCOUNTER — CLINICAL SUPPORT (OUTPATIENT)
Dept: CARDIOLOGY CLINIC | Age: 84
End: 2019-01-11

## 2019-01-11 DIAGNOSIS — I48.0 PAF (PAROXYSMAL ATRIAL FIBRILLATION) (HCC): Chronic | ICD-10-CM

## 2019-01-11 DIAGNOSIS — R06.00 DYSPNEA, UNSPECIFIED TYPE: ICD-10-CM

## 2019-01-11 DIAGNOSIS — I25.119 CORONARY ARTERY DISEASE INVOLVING NATIVE CORONARY ARTERY OF NATIVE HEART WITH ANGINA PECTORIS (HCC): Primary | Chronic | ICD-10-CM

## 2019-01-11 DIAGNOSIS — I10 HTN (HYPERTENSION), BENIGN: Chronic | ICD-10-CM

## 2019-01-11 DIAGNOSIS — I25.119 CORONARY ARTERY DISEASE INVOLVING NATIVE CORONARY ARTERY OF NATIVE HEART WITH ANGINA PECTORIS (HCC): ICD-10-CM

## 2019-01-11 DIAGNOSIS — R07.9 CHEST PAIN, UNSPECIFIED TYPE: ICD-10-CM

## 2019-01-11 DIAGNOSIS — I44.7 LBBB (LEFT BUNDLE BRANCH BLOCK): ICD-10-CM

## 2019-01-11 DIAGNOSIS — I42.9 CARDIOMYOPATHY, UNSPECIFIED TYPE (HCC): Chronic | ICD-10-CM

## 2019-01-11 DIAGNOSIS — I10 HTN (HYPERTENSION), BENIGN: ICD-10-CM

## 2019-01-11 DIAGNOSIS — E78.00 HIGH CHOLESTEROL: ICD-10-CM

## 2019-01-11 NOTE — PROGRESS NOTES
See scanned report. Dr. Tsering Iverson ordered study and Dr. Tsering Iverson read study. ID verified per protocol. Explained procedure and risks to patient. All concerns and questions ansewered prior to obtaining consent test. Patient developed dyspnea and headache during test. At 3 minutes in recovery, patient without symptoms or complaints voiced.

## 2019-01-14 NOTE — PROGRESS NOTES
Called patient ID verified using two patient identifiers. Informed patient of normal Stress Test per Dr. Marvin Will request. Patient verbalized understanding.

## 2019-01-24 ENCOUNTER — OFFICE VISIT (OUTPATIENT)
Dept: CARDIOLOGY CLINIC | Age: 84
End: 2019-01-24

## 2019-01-24 VITALS
SYSTOLIC BLOOD PRESSURE: 162 MMHG | HEIGHT: 67 IN | DIASTOLIC BLOOD PRESSURE: 90 MMHG | BODY MASS INDEX: 35.94 KG/M2 | RESPIRATION RATE: 16 BRPM | HEART RATE: 57 BPM | WEIGHT: 229 LBS

## 2019-01-24 DIAGNOSIS — I10 HTN (HYPERTENSION), BENIGN: ICD-10-CM

## 2019-01-24 DIAGNOSIS — I25.119 CORONARY ARTERY DISEASE INVOLVING NATIVE CORONARY ARTERY OF NATIVE HEART WITH ANGINA PECTORIS (HCC): Primary | ICD-10-CM

## 2019-01-24 DIAGNOSIS — I48.0 PAF (PAROXYSMAL ATRIAL FIBRILLATION) (HCC): ICD-10-CM

## 2019-01-24 DIAGNOSIS — I44.7 LBBB (LEFT BUNDLE BRANCH BLOCK): ICD-10-CM

## 2019-01-24 NOTE — PROGRESS NOTES
Chief Complaint   Patient presents with    Results     F/U Stress/Echo from 01/11/19     Visit Vitals  /90 (BP 1 Location: Left arm, BP Patient Position: Sitting)   Pulse (!) 57   Resp 16   Ht 5' 7\" (1.702 m)   Wt 229 lb (103.9 kg)   BMI 35.87 kg/m²

## 2019-01-24 NOTE — PROGRESS NOTES
Daniel Castro MD    [id number] [facility] [place], [address] [facility]    Office [phone],XFP [phone]  Pager [phone]    Conrad Bloom is a 80 y.o. male is here for f/u visit - Afib    Primary care physician:  Aleah Maldonado MD    Patient Active Problem List   Diagnosis Code    Unspecified hypothyroidism E03.9    HTN (hypertension), benign I10    Esophageal reflux K21.9    CAD (coronary artery disease) I25.10    Cardiomyopathy ([address] I42.9    GI bleed K92.2    Chronic anticoagulation Z79.01    PAF (paroxysmal atrial fibrillation) ([address] I48.0    LBBB (left bundle branch block) I44.7    High cholesterol E78.00       Chief complaint:  Chief Complaint   Patient presents with    Results     F/U Stress/Echo from 01/11/19       Assessment:  Cough  PAF - now Sinus Teola Cheeks - was on amiodaraone - now off it ( office visit 9/18/14)- \"pt not sure - thought he was supposed to come off it\". Was not restarted. He was on Eliquis but had a GI bleed in October 2016 (please see discussion below) and is now on aspirin. LBBB ( old)  Hx of NSVT  Non obstructive CAD by cath 5/2014  Cardiomyopathy -EF50%/DD- grade 1/ non ischemic - clinically euvolemic  Chronic dizziness - hx of BPV  \"Cool extremeties- admn 5/5/14 - seen by Dr Warren Lyle 5/12/14 - SERAFIN - at ankle (Nml SERAFIN 1.3 bilat with PVR) - neg u/s for DVT and VQ scan\"  (Hx of Anemia w/u 5/5/14 - Dr Nickolas Perera - Mild antral gastritis/ Mild sigmoid diverticulosis and small internal hemorrhoids, otherwise mucosa within normal. - per pt -anemia resolved-recurrent GI bleed October 2016. As per the discussion during the admission, patient did not want any further workup for his diverticulitis history/repeat procedures as part of the gastroenterologist note and it was recommended that because of the 25% risk of rebleeding if Eliquis was restarted, patient would be on aspirin alone.)            Plan:   Stress nuclear study.   1/2019-normal  Echocardiogram. 1/2019-normal EF/grade 1 diastolic dysfunction  On ASA/ B Blocker/ACRB /statin-not on ACE inhibitor  Daily wts  Aggressive CV risk factor modification  Lipids perPCP  Advised to follow-up with PCP regarding the cough. May need pulmonary workup. F/u appt in 6 mths/prn        I appreciate the opportunity to be involved in Mr. Ríos. See note below for details. Please do not hesitate to contact us with questions or concerns. ATTENTION:   This medical record was transcribed using an electronic medical records/speech recognition system. Although proofread, it may and can contain electronic, spelling and other errors. Corrections may be executed at a later time. Please feel free to contact us for any clarifications as needed. Maryana Connors MD    Cardiac Testing/ Procedures: A. Cardiac Cath/PCI: 5/9/14  L Main: MLI  LAD: Med to Large prox ; Mid and distal LAD - small to med; At D1 - tandem  50% lesions; D1 - small to med - MLI  LCflex: Large; MLI; OM1;OM2 - MLI  RCA: Subselective inj ( WRP catheter) - tortuous; MLI  LVEDP: 10  LVEF: 40%  No significant gradient across aortic valve. RHC findings:  RAP= 2 mmHg  RVSP= 31 mmHg  PAP= 15 mmHg  PCWP= 6 mmHg  CO= 5.61 l/min  CI=2.64  Closure Device: Manual          B.ECHO/KIRT: 1/11/19-EF 61-49%, grade 1 diastolic dysfunction, mildly dilated LAD, PAS P 34 mmHg    12/8/15 Left ventricle: Systolic function was normal by EF (biplane method of  disks). Ejection fraction was estimated to be 50 %. There were no regional  wall motion abnormalities. Doppler parameters were consistent with  abnormal left ventricular relaxation (grade 1 diastolic dysfunction). Left atrium: The atrium was moderately dilated. Mitral valve: There was mild annular calcification. There was mild  regurgitation. Aortic valve: Normal valve structure. Leaflets exhibited mild  calcification and sclerosis. Tricuspid valve: There was moderate regurgitation.      9/12/14 Left ventricle: Systolic function was mildly reduced. Ejection fraction  was estimated to be 50 %. There was moderate diffuse hypokinesis. There  was severe hypokinesis of the basal-mid inferior wall(s). Doppler  parameters were consistent with abnormal left ventricular relaxation  (grade 1 diastolic dysfunction). Left atrium: The atrium was moderately to markedly dilated. Right atrium: The atrium was dilated. Mitral valve: There was mild annular calcification. There was mild  regurgitation. Tricuspid valve: There was mild regurgitation. 5/6/14 Left ventricle: Size was normal. Systolic function was markedly reduced. Ejection fraction was estimated to be 25 %. There was severe diffuse  hypokinesis with distinct regional wall motion abnormalities. Doppler  parameters were consistent with abnormal left ventricular relaxation  (grade 1 diastolic dysfunction). Right ventricle: The size was normal. Systolic function was normal.    Left atrium: The atrium was mildly dilated. Mitral valve: There was moderate annular calcification. There was mild  regurgitation. Regurgitation grade was 1+ on a scale of 0 to 4+. C.StressNuclear/Stress ECHO/Stress test: 1/11/19-normal Nila nuclear study. EF 43%    D. Vascular:    E. EP:    F. Miscellaneous:    Subjective:  Estelle Lezama is a 80 y.o. male who returns for follow up visit. No chest pain, dyspnea. Continues to have coughing spells. No swelling lower extremities. (s GI bleed in October 2016 when his Eliquis was stopped. As per the discussion during the admission, patient did not want any further workup for his diverticulitis history/repeat procedures as part of the gastroenterologist note and it was recommended that because of the 25% risk of rebleeding if Eliquis was restarted, patient would be on aspirin alone. He is at [facility] and is in a cottage. His son owns a [employer] dealerships  and checks on him.   Patient was  for 60+ years and  lost his wife in 2015.)        (Torito Gonzalez is a 66 y.o. male with a hx of htn, dyslipidemia, hypothyroidism, Gout and prostate cancer s/p prostatectomy admitted for worsened lethargy and dizziness to [facility] on 5/5/14. Mliss Meléndez Hx of  afib with RVR the pt was started on diltiazem but is remaining in the 120-130 range. EKG showed a LBBB in addition to afib. Stress test with Myoview on 1/2013 showed an EF of 62% with no evidence of ischemia. Hx of prior LBBB. Hx of chronic dizziness - BPV. (not assoc with afib). ECHO showed EF of 23% (  clicnically well compensated) - underwent RHC/LHC - non obstructive CAD. Also had  NSVT. Seen by  [facility]. Started on Amiodarone and Eliquis. During office visit 9/2014 - was not taking amiodarone. Was in sinus erica and it was not restarted.)                   ROS:  (bold if positive, if negative)             Medications before admission:    Current Outpatient Prescriptions   Medication Sig Dispense    carvedilol (COREG) 25 mg tablet TAKE ONE TABLET BY MOUTH TWICE A DAY WITH MEALS  60 Tab    lisinopril (PRINIVIL, ZESTRIL) 2.5 mg tablet TAKE ONE TABLET BY MOUTH ONCE A DAY  90 Tab    ELIQUIS 5 mg tablet TAKE ONE TABLET BY MOUTH TWICE A DAY  30 Tab    levothyroxine (LEVOTHROID) 50 mcg tablet Take 50 mcg by mouth Daily (before breakfast).  calcium carbonate (TUMS) 200 mg calcium (500 mg) chew Take 2 Tabs by mouth daily.  omeprazole (PRILOSEC) 20 mg capsule Take 20 mg by mouth daily.  loratadine (CLARITIN) 10 mg tablet Take 10 mg by mouth daily.  polyethylene glycol (MIRALAX) 17 gram packet Take 17 g by mouth daily.  psyllium (METAMUCIL) packet Take 1 Packet by mouth daily.  memantine (NAMENDA) 10 mg tablet Take 10 mg by mouth two (2) times a day.  simvastatin (ZOCOR) 20 mg tablet Take 20 mg by mouth nightly.  aspirin 81 mg tablet Take 81 mg by mouth daily.  MULTIVITAMIN PO Take 1 Tab by mouth daily.          90 Tab    zolpidem (AMBIEN) 10 mg tablet Take 10 mg by mouth nightly. No current facility-administered medications for this visit. Physical Exam:  Visit Vitals  /90 (BP 1 Location: Left arm, BP Patient Position: Sitting)   Pulse (!) 57   Resp 16   Ht 5' 7\" (1.702 m)   Wt 229 lb (103.9 kg)   BMI 35.87 kg/m²      Repeat /80    Gen: Well-developed, well-nourished, in no acute distress  Neck: Supple,No JVD, No Carotid Bruit,   Resp: No accessory muscle use, Dec AE Bilat, No rales or rhonchi  Card: Regular Rate,Rythm,Normal S1, S2, No murmurs, rubs or gallop.    Abd:  Soft, non-tender, non-distended,BS+,   MSK: No cyanosis  Neuro: moving all four extremities , follows commands appropriately  Psych:  Good insight, oriented to person, place , alert, Nml Affect  LE: No edema    EKG:      LABS:        Lab Results   Component Value Date/Time    WBC 9.9 12/13/2018 04:27 PM    HGB 15.8 12/13/2018 04:27 PM    HCT 47.1 12/13/2018 04:27 PM    PLATELET 276 88/42/7050 04:27 PM    MCV 91 12/13/2018 04:27 PM     Lab Results   Component Value Date/Time    Sodium 143 12/13/2018 04:27 PM    Potassium 5.1 12/13/2018 04:27 PM    Chloride 105 12/13/2018 04:27 PM    CO2 24 12/13/2018 04:27 PM    Anion gap 7 10/04/2016 04:59 AM    Glucose 84 12/13/2018 04:27 PM    BUN 22 12/13/2018 04:27 PM    Creatinine 1.24 12/13/2018 04:27 PM    BUN/Creatinine ratio 18 12/13/2018 04:27 PM    GFR est AA 62 12/13/2018 04:27 PM    GFR est non-AA 53 (L) 12/13/2018 04:27 PM    Calcium 9.3 12/13/2018 04:27 PM       Lab Results   Component Value Date/Time    aPTT 25.7 05/05/2014 10:30 AM     Lab Results   Component Value Date/Time    INR 1.1 10/02/2016 09:16 AM    INR 1.0 05/05/2014 10:30 AM    Prothrombin time 10.8 10/02/2016 09:16 AM    Prothrombin time 10.5 05/05/2014 10:30 AM     No components found for: Maria Luisa Reddy MD

## 2019-04-26 ENCOUNTER — OFFICE VISIT (OUTPATIENT)
Dept: CARDIOLOGY CLINIC | Age: 84
End: 2019-04-26

## 2019-04-26 VITALS
DIASTOLIC BLOOD PRESSURE: 92 MMHG | SYSTOLIC BLOOD PRESSURE: 168 MMHG | OXYGEN SATURATION: 95 % | BODY MASS INDEX: 36.57 KG/M2 | RESPIRATION RATE: 16 BRPM | WEIGHT: 233 LBS | HEART RATE: 62 BPM | HEIGHT: 67 IN

## 2019-04-26 DIAGNOSIS — I25.119 CORONARY ARTERY DISEASE INVOLVING NATIVE CORONARY ARTERY OF NATIVE HEART WITH ANGINA PECTORIS (HCC): ICD-10-CM

## 2019-04-26 DIAGNOSIS — I48.0 PAF (PAROXYSMAL ATRIAL FIBRILLATION) (HCC): ICD-10-CM

## 2019-04-26 DIAGNOSIS — I10 HTN (HYPERTENSION), BENIGN: Primary | ICD-10-CM

## 2019-04-26 DIAGNOSIS — I44.7 LBBB (LEFT BUNDLE BRANCH BLOCK): ICD-10-CM

## 2019-04-26 PROBLEM — E66.01 SEVERE OBESITY (HCC): Status: ACTIVE | Noted: 2019-04-26

## 2019-04-26 RX ORDER — DUREZOL 0.5 MG/ML
EMULSION OPHTHALMIC
Status: ON HOLD | COMMUNITY
Start: 2019-03-19 | End: 2019-10-22

## 2019-04-26 RX ORDER — LOSARTAN POTASSIUM 100 MG/1
100 TABLET ORAL DAILY
Qty: 30 TAB | Refills: 3 | Status: SHIPPED | OUTPATIENT
Start: 2019-04-26 | End: 2019-05-28 | Stop reason: SDUPTHER

## 2019-04-26 NOTE — PROGRESS NOTES
Chief Complaint   Patient presents with    Follow-up     Patient presents today for a follow up on CAD, HTN & PAF.     Coronary Artery Disease    Hypertension     Visit Vitals  BP (!) 168/92 (BP 1 Location: Left arm, BP Patient Position: Sitting)   Pulse 62   Resp 16   Ht 5' 7\" (1.702 m)   Wt 233 lb (105.7 kg)   SpO2 95%   BMI 36.49 kg/m²     Chest pain - denied  SOB - denied  Dizziness - denied  Swelling/Edema - denied  Recent hospital visit - denied  Refills - none at this time  Patient had Cataract surgery on left eye in March 2019

## 2019-04-26 NOTE — TELEPHONE ENCOUNTER
Medication change per VO Dr Karely Aceves. Requested Prescriptions     Pending Prescriptions Disp Refills    losartan (COZAAR) 100 mg tablet 30 Tab 3     Sig: Take 1 Tab by mouth daily.

## 2019-04-26 NOTE — PROGRESS NOTES
Jarvis Jain MD    [id number] [facility] [place], [address] [facility]    Office [phone],CDB [phone]  Pager [phone]    Anju Oliveros is a 80 y.o. male is here for f/u visit - Afib    Primary care physician:  Anam Ibrahim MD    Patient Active Problem List   Diagnosis Code    Unspecified hypothyroidism E03.9    HTN (hypertension), benign I10    Esophageal reflux K21.9    CAD (coronary artery disease) I25.10    Cardiomyopathy ([address] I42.9    GI bleed K92.2    Chronic anticoagulation Z79.01    PAF (paroxysmal atrial fibrillation) (HCC) I48.0    LBBB (left bundle branch block) I44.7    High cholesterol E78.00    Severe obesity ([address] E66.01       Chief complaint:  Chief Complaint   Patient presents with    Follow-up     Patient presents today for a follow up on CAD, HTN & PAF.  Coronary Artery Disease    Hypertension       Assessment:    PAF - - was on amiodaraone - now off it ( office visit 9/18/14)- \"pt not sure - thought he was supposed to come off it\". Was not restarted. He was on Eliquis but had a GI bleed in October 2016 (please see discussion below) and is now on aspirin. HTN  LBBB ( old)  Hx of NSVT  Non obstructive CAD by cath 5/2014  Cardiomyopathy -EF50%/DD- grade 1/ non ischemic - clinically euvolemic  Chronic dizziness - hx of BPV  \"Cool extremeties- admn 5/5/14 - seen by Dr Rosaline Evans 5/12/14 - SERAFIN - at ankle (Nml SREAFIN 1.3 bilat with PVR) - neg u/s for DVT and VQ scan\"  (Hx of Anemia w/u 5/5/14 - Dr Daryn Sanchez - Mild antral gastritis/ Mild sigmoid diverticulosis and small internal hemorrhoids, otherwise mucosa within normal. - per pt -anemia resolved-recurrent GI bleed October 2016. As per the discussion during the admission, patient did not want any further workup for his diverticulitis history/repeat procedures as part of the gastroenterologist note and it was recommended that because of the 25% risk of rebleeding if Eliquis was restarted, patient would be on aspirin alone.)            Plan:   Stress nuclear study. 1/2019-normal  Echocardiogram.  1/2019-normal EF/grade 1 diastolic dysfunction  On ASA/ B Blocker/ARB /statin-not on ACE inhibitor  Blood pressure running high. Will increase Cozaar 100 mg daily. Will need BMP in 3 to 4 weeks/blood pressure check. Daily wts  Aggressive CV risk factor modification  Lipids perPCP  F/u appt in 6 mths/prn        I appreciate the opportunity to be involved in Mr. Ríos. See note below for details. Please do not hesitate to contact us with questions or concerns. ATTENTION:   This medical record was transcribed using an electronic medical records/speech recognition system. Although proofread, it may and can contain electronic, spelling and other errors. Corrections may be executed at a later time. Please feel free to contact us for any clarifications as needed. Trip Peterson MD    Cardiac Testing/ Procedures: A. Cardiac Cath/PCI: 5/9/14  L Main: MLI  LAD: Med to Large prox ; Mid and distal LAD - small to med; At D1 - tandem  50% lesions; D1 - small to med - MLI  LCflex: Large; MLI; OM1;OM2 - MLI  RCA: Subselective inj ( WRP catheter) - tortuous; MLI  LVEDP: 10  LVEF: 40%  No significant gradient across aortic valve. RHC findings:  RAP= 2 mmHg  RVSP= 31 mmHg  PAP= 15 mmHg  PCWP= 6 mmHg  CO= 5.61 l/min  CI=2.64  Closure Device: Manual          B.ECHO/KIRT: 1/11/19-EF 52-84%, grade 1 diastolic dysfunction, mildly dilated LAD, PAS P 34 mmHg    12/8/15 Left ventricle: Systolic function was normal by EF (biplane method of  disks). Ejection fraction was estimated to be 50 %. There were no regional  wall motion abnormalities. Doppler parameters were consistent with  abnormal left ventricular relaxation (grade 1 diastolic dysfunction). Left atrium: The atrium was moderately dilated. Mitral valve: There was mild annular calcification. There was mild  regurgitation. Aortic valve: Normal valve structure. Leaflets exhibited mild  calcification and sclerosis. Tricuspid valve: There was moderate regurgitation. 9/12/14 Left ventricle: Systolic function was mildly reduced. Ejection fraction  was estimated to be 50 %. There was moderate diffuse hypokinesis. There  was severe hypokinesis of the basal-mid inferior wall(s). Doppler  parameters were consistent with abnormal left ventricular relaxation  (grade 1 diastolic dysfunction). Left atrium: The atrium was moderately to markedly dilated. Right atrium: The atrium was dilated. Mitral valve: There was mild annular calcification. There was mild  regurgitation. Tricuspid valve: There was mild regurgitation. 5/6/14 Left ventricle: Size was normal. Systolic function was markedly reduced. Ejection fraction was estimated to be 25 %. There was severe diffuse  hypokinesis with distinct regional wall motion abnormalities. Doppler  parameters were consistent with abnormal left ventricular relaxation  (grade 1 diastolic dysfunction). Right ventricle: The size was normal. Systolic function was normal.    Left atrium: The atrium was mildly dilated. Mitral valve: There was moderate annular calcification. There was mild  regurgitation. Regurgitation grade was 1+ on a scale of 0 to 4+. C.StressNuclear/Stress ECHO/Stress test: 1/11/19-normal Nila nuclear study. EF 43%    D. Vascular:    E. EP:    F. Miscellaneous:    Subjective:  Lisette Arechiga is a 80 y.o. male who returns for follow up visit. No chest pain, dyspnea. No swelling lower extremities. His home blood pressures have been running high especially in the afternoon. He sometimes has a headache with it and has to take medications for the headache. (GI bleed in October 2016 when his Eliquis was stopped.   As per the discussion during the admission, patient did not want any further workup for his diverticulitis history/repeat procedures as part of the gastroenterologist note and it was recommended that because of the 25% risk of rebleeding if Eliquis was restarted, patient would be on aspirin alone. He is at [facility] and is in a cottage. His son owns a [employer] dealerships  and checks on him. Patient was  for 60+ years and  lost his wife in 2015.)        (Joe Sneed is a 66 y.o. male with a hx of htn, dyslipidemia, hypothyroidism, Gout and prostate cancer s/p prostatectomy admitted for worsened lethargy and dizziness to [facility] on 5/5/14. [place] Hx of  afib with RVR the pt was started on diltiazem but is remaining in the 120-130 range. EKG showed a LBBB in addition to afib. Stress test with Myoview on 1/2013 showed an EF of 62% with no evidence of ischemia. Hx of prior LBBB. Hx of chronic dizziness - BPV. (not assoc with afib). ECHO showed EF of 18% (  clicnically well compensated) - underwent RHC/LHC - non obstructive CAD. Also had  NSVT. Seen by  [facility]. Started on Amiodarone and Eliquis. During office visit 9/2014 - was not taking amiodarone. Was in sinus erica and it was not restarted.)                   ROS:  (bold if positive, if negative)             Medications before admission:      Current Outpatient Medications:     DUREZOL 0.05 % ophthalmic emulsion, , Disp: , Rfl:     famotidine (PEPCID) 20 mg tablet, Take 20 mg by mouth two (2) times a day., Disp: , Rfl:     losartan (COZAAR) 50 mg tablet, Take  by mouth daily. , Disp: , Rfl:     traZODone (DESYREL) 50 mg tablet, Take 50 mg by mouth nightly., Disp: , Rfl:     CALCIUM CARBONATE (CALCIUM 500 PO), Take 1 Tab by mouth daily. , Disp: , Rfl:     memantine ER (NAMENDA XR) 28 mg capsule, Take 28 mg by mouth daily. , Disp: , Rfl:     levothyroxine (SYNTHROID) 75 mcg tablet, Take 75 mcg by mouth Daily (before breakfast). , Disp: , Rfl:     carvedilol (COREG) 25 mg tablet, TAKE ONE TABLET BY MOUTH TWICE A DAY WITH MEALS, Disp: 60 Tab, Rfl: 1    loratadine (CLARITIN) 10 mg tablet, Take 10 mg by mouth daily. , Disp: , Rfl:     polyethylene glycol (MIRALAX) 17 gram packet, Take 17 g by mouth daily. , Disp: , Rfl:     psyllium (METAMUCIL) packet, Take 1 Packet by mouth daily. , Disp: , Rfl:     simvastatin (ZOCOR) 20 mg tablet, Take 20 mg by mouth nightly., Disp: , Rfl:     aspirin 81 mg tablet, Take 81 mg by mouth nightly., Disp: , Rfl:     MULTIVITAMIN PO, Take 1 Tab by mouth daily. , Disp: , Rfl:         Physical Exam:  Visit Vitals  BP (!) 168/92 (BP 1 Location: Left arm, BP Patient Position: Sitting)   Pulse 62   Resp 16   Ht 5' 7\" (1.702 m)   Wt 233 lb (105.7 kg)   SpO2 95%   BMI 36.49 kg/m²          Gen: Well-developed, well-nourished, in no acute distress  Neck: Supple,No JVD, No Carotid Bruit,   Resp: No accessory muscle use, Dec AE Bilat, No rales or rhonchi  Card: Regular Rate,Rythm,Normal S1, S2, No murmurs, rubs or gallop.    Abd:  Soft, non-tender, non-distended,BS+,   MSK: No cyanosis  Neuro: moving all four extremities , follows commands appropriately  Psych:  Good insight, oriented to person, place , alert, Nml Affect  LE: No edema    EKG:      LABS:        Lab Results   Component Value Date/Time    WBC 9.9 12/13/2018 04:27 PM    HGB 15.8 12/13/2018 04:27 PM    HCT 47.1 12/13/2018 04:27 PM    PLATELET 285 89/89/0659 04:27 PM    MCV 91 12/13/2018 04:27 PM     Lab Results   Component Value Date/Time    Sodium 143 12/13/2018 04:27 PM    Potassium 5.1 12/13/2018 04:27 PM    Chloride 105 12/13/2018 04:27 PM    CO2 24 12/13/2018 04:27 PM    Anion gap 7 10/04/2016 04:59 AM    Glucose 84 12/13/2018 04:27 PM    BUN 22 12/13/2018 04:27 PM    Creatinine 1.24 12/13/2018 04:27 PM    BUN/Creatinine ratio 18 12/13/2018 04:27 PM    GFR est AA 62 12/13/2018 04:27 PM    GFR est non-AA 53 (L) 12/13/2018 04:27 PM    Calcium 9.3 12/13/2018 04:27 PM       Lab Results   Component Value Date/Time    aPTT 25.7 05/05/2014 10:30 AM     Lab Results   Component Value Date/Time    INR 1.1 10/02/2016 09:16 AM    INR 1.0 05/05/2014 10:30 AM Prothrombin time 10.8 10/02/2016 09:16 AM    Prothrombin time 10.5 05/05/2014 10:30 AM     No components found for: Laverne Ford MD

## 2019-05-28 RX ORDER — LOSARTAN POTASSIUM 100 MG/1
100 TABLET ORAL DAILY
Qty: 90 TAB | Refills: 1 | Status: SHIPPED | OUTPATIENT
Start: 2019-05-28 | End: 2020-01-03

## 2019-05-28 NOTE — TELEPHONE ENCOUNTER
Last appt: 4/26/2019  Future Appointments   Date Time Provider Ren Armstrong   10/25/2019  2:40 PM Leeanna Hoover MD [address] [facility]       Requested Prescriptions     Pending Prescriptions Disp Refills    losartan (COZAAR) 100 mg tablet 90 Tab 1     Sig: Take 1 Tab by mouth daily. Requesting for 90 day supply.

## 2019-10-22 ENCOUNTER — APPOINTMENT (OUTPATIENT)
Dept: GENERAL RADIOLOGY | Age: 84
DRG: 280 | End: 2019-10-22
Attending: EMERGENCY MEDICINE
Payer: MEDICARE

## 2019-10-22 ENCOUNTER — APPOINTMENT (OUTPATIENT)
Dept: CT IMAGING | Age: 84
DRG: 280 | End: 2019-10-22
Attending: INTERNAL MEDICINE
Payer: MEDICARE

## 2019-10-22 ENCOUNTER — APPOINTMENT (OUTPATIENT)
Dept: NON INVASIVE DIAGNOSTICS | Age: 84
DRG: 280 | End: 2019-10-22
Attending: INTERNAL MEDICINE
Payer: MEDICARE

## 2019-10-22 ENCOUNTER — APPOINTMENT (OUTPATIENT)
Dept: CT IMAGING | Age: 84
DRG: 280 | End: 2019-10-22
Attending: EMERGENCY MEDICINE
Payer: MEDICARE

## 2019-10-22 ENCOUNTER — HOSPITAL ENCOUNTER (INPATIENT)
Age: 84
LOS: 2 days | Discharge: HOME | DRG: 280 | End: 2019-10-24
Attending: EMERGENCY MEDICINE | Admitting: INTERNAL MEDICINE
Payer: MEDICARE

## 2019-10-22 DIAGNOSIS — I50.9 ACUTE ON CHRONIC CONGESTIVE HEART FAILURE, UNSPECIFIED HEART FAILURE TYPE (HCC): Primary | ICD-10-CM

## 2019-10-22 DIAGNOSIS — I21.4 NSTEMI (NON-ST ELEVATED MYOCARDIAL INFARCTION) (HCC): ICD-10-CM

## 2019-10-22 PROBLEM — R07.9 CHEST PAIN: Status: ACTIVE | Noted: 2019-10-22

## 2019-10-22 PROBLEM — E66.9 OBESITY: Status: ACTIVE | Noted: 2019-04-26

## 2019-10-22 PROBLEM — R77.8 ELEVATED TROPONIN: Status: ACTIVE | Noted: 2019-10-22

## 2019-10-22 PROBLEM — J96.01 ACUTE RESPIRATORY FAILURE WITH HYPOXIA (HCC): Status: ACTIVE | Noted: 2019-10-22

## 2019-10-22 PROBLEM — R06.02 SOB (SHORTNESS OF BREATH): Status: ACTIVE | Noted: 2019-10-22

## 2019-10-22 LAB
ALBUMIN SERPL-MCNC: 3.3 G/DL (ref 3.5–5)
ALBUMIN/GLOB SERPL: 0.9 {RATIO} (ref 1.1–2.2)
ALP SERPL-CCNC: 70 U/L (ref 45–117)
ALT SERPL-CCNC: 16 U/L (ref 12–78)
ANION GAP SERPL CALC-SCNC: 10 MMOL/L (ref 5–15)
APTT PPP: 24.6 SEC (ref 22.1–32)
APTT PPP: 49.1 SEC (ref 22.1–32)
APTT PPP: 61.3 SEC (ref 22.1–32)
ARTERIAL PATENCY WRIST A: YES
AST SERPL-CCNC: 20 U/L (ref 15–37)
ATRIAL RATE: 75 BPM
AV PEAK GRADIENT: 15.32 MMHG
AV VELOCITY RATIO: 0.43
AV VTI RATIO: 0.4
B PERT DNA SPEC QL NAA+PROBE: NOT DETECTED
BASE DEFICIT BLDA-SCNC: 4.2 MMOL/L
BASOPHILS # BLD: 0.1 K/UL (ref 0–0.1)
BASOPHILS # BLD: 0.1 K/UL (ref 0–0.1)
BASOPHILS NFR BLD: 0 % (ref 0–1)
BASOPHILS NFR BLD: 0 % (ref 0–1)
BDY SITE: ABNORMAL
BILIRUB SERPL-MCNC: 0.4 MG/DL (ref 0.2–1)
BNP SERPL-MCNC: 3612 PG/ML
BREATHS.SPONTANEOUS ON VENT: 22
BUN SERPL-MCNC: 19 MG/DL (ref 6–20)
BUN/CREAT SERPL: 15 (ref 12–20)
C PNEUM DNA SPEC QL NAA+PROBE: NOT DETECTED
CALCIUM SERPL-MCNC: 8.3 MG/DL (ref 8.5–10.1)
CALCULATED R AXIS, ECG10: -17 DEGREES
CALCULATED T AXIS, ECG11: 138 DEGREES
CHLORIDE SERPL-SCNC: 104 MMOL/L (ref 97–108)
CHOLEST SERPL-MCNC: 254 MG/DL
CO2 SERPL-SCNC: 21 MMOL/L (ref 21–32)
COMMENT, HOLDF: NORMAL
COMMENT, HOLDF: NORMAL
CREAT SERPL-MCNC: 1.28 MG/DL (ref 0.7–1.3)
DIAGNOSIS, 93000: NORMAL
DIFFERENTIAL METHOD BLD: ABNORMAL
DIFFERENTIAL METHOD BLD: ABNORMAL
ECHO AO ASC DIAM: 3.28 CM
ECHO AO ROOT DIAM: 3.75 CM
ECHO AV AREA PEAK VELOCITY: 1.5 CM2
ECHO AV AREA VTI: 1.4 CM2
ECHO AV AREA/BSA PEAK VELOCITY: 0.7 CM2/M2
ECHO AV AREA/BSA VTI: 0.6 CM2/M2
ECHO AV MEAN GRADIENT: 8.1 MMHG
ECHO AV MEAN VELOCITY: 1.34 M/S
ECHO AV PEAK GRADIENT: 14.4 MMHG
ECHO AV PEAK VELOCITY: 190.03 CM/S
ECHO AV REGURGITANT PHT: 1022.6 CM
ECHO AV VTI: 35.7 CM
ECHO IVC SNIFF: 1.69 CM
ECHO LA AREA 4C: 15.5 CM2
ECHO LA MAJOR AXIS: 4.56 CM
ECHO LA TO AORTIC ROOT RATIO: 0.82
ECHO LA VOL 2C: 59.85 ML (ref 18–58)
ECHO LA VOL 4C: 35.02 ML (ref 18–58)
ECHO LA VOL BP: 47.8 ML (ref 18–58)
ECHO LA VOL/BSA BIPLANE: 21.92 ML/M2 (ref 16–28)
ECHO LA VOLUME INDEX A2C: 27.45 ML/M2 (ref 16–28)
ECHO LA VOLUME INDEX A4C: 16.06 ML/M2 (ref 16–28)
ECHO LV E' LATERAL VELOCITY: 4.66 CENTIMETER/SECOND
ECHO LV E' SEPTAL VELOCITY: 3.8 CENTIMETER/SECOND
ECHO LV EDV A2C: 98.2 ML
ECHO LV EDV A4C: 97.8 ML
ECHO LV EDV BP: 99.7 ML (ref 67–155)
ECHO LV EDV INDEX A4C: 44.9 ML/M2
ECHO LV EDV INDEX BP: 45.7 ML/M2
ECHO LV EDV NDEX A2C: 45 ML/M2
ECHO LV EDV TEICHHOLZ: 0.67 ML
ECHO LV EJECTION FRACTION A2C: 56 %
ECHO LV EJECTION FRACTION A4C: 41 %
ECHO LV EJECTION FRACTION BIPLANE: 50.7 % (ref 55–100)
ECHO LV ESV A2C: 43 ML
ECHO LV ESV A4C: 57.4 ML
ECHO LV ESV BP: 49.2 ML (ref 22–58)
ECHO LV ESV INDEX A2C: 19.7 ML/M2
ECHO LV ESV INDEX A4C: 26.3 ML/M2
ECHO LV ESV INDEX BP: 22.6 ML/M2
ECHO LV ESV TEICHHOLZ: 0.41 ML
ECHO LV INTERNAL DIMENSION DIASTOLIC: 5 CM (ref 4.2–5.9)
ECHO LV INTERNAL DIMENSION SYSTOLIC: 4.1 CM
ECHO LV IVSD: 1.2 CM (ref 0.6–1)
ECHO LV MASS 2D: 281.8 G (ref 88–224)
ECHO LV MASS INDEX 2D: 129.3 G/M2 (ref 49–115)
ECHO LV POSTERIOR WALL DIASTOLIC: 1.2 CM (ref 0.6–1)
ECHO LVOT DIAM: 2.13 CM
ECHO LVOT PEAK GRADIENT: 2.7 MMHG
ECHO LVOT PEAK VELOCITY: 81.89 CM/S
ECHO LVOT SV: 51 ML
ECHO LVOT VTI: 14.35 CM
ECHO MV A VELOCITY: 75.26 CM/S
ECHO MV AREA PHT: 1.9 CM2
ECHO MV AREA PISA: 0 CM2
ECHO MV E DECELERATION TIME (DT): 391.3 MS
ECHO MV E VELOCITY: 47.69 CM/S
ECHO MV E/A RATIO: 0.63
ECHO MV EROA PISA: 0 CM2
ECHO MV PRESSURE HALF TIME (PHT): 113.5 MS
ECHO MV REGURGITANT PEAK GRADIENT: 111.1 MMHG
ECHO MV REGURGITANT PEAK VELOCITY: 527.05 CM/S
ECHO MV REGURGITANT RADIUS PISA: 0.31 CM
ECHO PV MAX VELOCITY: 86.51 CM/S
ECHO PV PEAK GRADIENT: 3 MMHG
ECHO RV INTERNAL DIMENSION: 3.42 CM
ECHO RV TAPSE: 1.73 CM (ref 1.5–2)
ECHO TV REGURGITANT MAX VELOCITY: 267.55 CM/S
ECHO TV REGURGITANT PEAK GRADIENT: 28.6 MMHG
EOSINOPHIL # BLD: 0.1 K/UL (ref 0–0.4)
EOSINOPHIL # BLD: 0.2 K/UL (ref 0–0.4)
EOSINOPHIL NFR BLD: 1 % (ref 0–7)
EOSINOPHIL NFR BLD: 2 % (ref 0–7)
EPAP/CPAP/PEEP, PAPEEP: 6
ERYTHROCYTE [DISTWIDTH] IN BLOOD BY AUTOMATED COUNT: 12.8 % (ref 11.5–14.5)
ERYTHROCYTE [DISTWIDTH] IN BLOOD BY AUTOMATED COUNT: 12.9 % (ref 11.5–14.5)
EST. AVERAGE GLUCOSE BLD GHB EST-MCNC: 120 MG/DL
FIO2 ON VENT: 55 %
FLUAV H1 2009 PAND RNA SPEC QL NAA+PROBE: NOT DETECTED
FLUAV H1 RNA SPEC QL NAA+PROBE: NOT DETECTED
FLUAV H3 RNA SPEC QL NAA+PROBE: NOT DETECTED
FLUAV SUBTYP SPEC NAA+PROBE: NOT DETECTED
FLUBV RNA SPEC QL NAA+PROBE: NOT DETECTED
GLOBULIN SER CALC-MCNC: 3.6 G/DL (ref 2–4)
GLUCOSE SERPL-MCNC: 167 MG/DL (ref 65–100)
HADV DNA SPEC QL NAA+PROBE: NOT DETECTED
HBA1C MFR BLD: 5.8 % (ref 4.2–6.3)
HCO3 BLDA-SCNC: 20 MMOL/L (ref 22–26)
HCOV 229E RNA SPEC QL NAA+PROBE: NOT DETECTED
HCOV HKU1 RNA SPEC QL NAA+PROBE: NOT DETECTED
HCOV NL63 RNA SPEC QL NAA+PROBE: NOT DETECTED
HCOV OC43 RNA SPEC QL NAA+PROBE: NOT DETECTED
HCT VFR BLD AUTO: 44.8 % (ref 36.6–50.3)
HCT VFR BLD AUTO: 47.8 % (ref 36.6–50.3)
HDLC SERPL-MCNC: 62 MG/DL
HDLC SERPL: 4.1 {RATIO} (ref 0–5)
HGB BLD-MCNC: 15 G/DL (ref 12.1–17)
HGB BLD-MCNC: 15.9 G/DL (ref 12.1–17)
HMPV RNA SPEC QL NAA+PROBE: NOT DETECTED
HPIV1 RNA SPEC QL NAA+PROBE: NOT DETECTED
HPIV2 RNA SPEC QL NAA+PROBE: NOT DETECTED
HPIV3 RNA SPEC QL NAA+PROBE: NOT DETECTED
HPIV4 RNA SPEC QL NAA+PROBE: NOT DETECTED
IMM GRANULOCYTES # BLD AUTO: 0 K/UL (ref 0–0.04)
IMM GRANULOCYTES # BLD AUTO: 0.1 K/UL (ref 0–0.04)
IMM GRANULOCYTES NFR BLD AUTO: 0 % (ref 0–0.5)
IMM GRANULOCYTES NFR BLD AUTO: 0 % (ref 0–0.5)
IPAP/PIP, IPAPIP: 14
LDLC SERPL CALC-MCNC: 149.4 MG/DL (ref 0–100)
LIPID PROFILE,FLP: ABNORMAL
LVFS 2D: 18.64 %
LVOT MG: 1.32 MMHG
LVOT MV: 0.51 CM/S
LVSV (MOD BI): 22.59 ML
LVSV (MOD SINGLE 4C): 18.06 ML
LVSV (MOD SINGLE): 24.66 ML
LVSV (TEICH): 20.13 ML
LYMPHOCYTES # BLD: 1.8 K/UL (ref 0.8–3.5)
LYMPHOCYTES # BLD: 2.2 K/UL (ref 0.8–3.5)
LYMPHOCYTES NFR BLD: 15 % (ref 12–49)
LYMPHOCYTES NFR BLD: 17 % (ref 12–49)
M PNEUMO DNA SPEC QL NAA+PROBE: NOT DETECTED
MAGNESIUM SERPL-MCNC: 2.1 MG/DL (ref 1.6–2.4)
MCH RBC QN AUTO: 30 PG (ref 26–34)
MCH RBC QN AUTO: 30.4 PG (ref 26–34)
MCHC RBC AUTO-ENTMCNC: 33.3 G/DL (ref 30–36.5)
MCHC RBC AUTO-ENTMCNC: 33.5 G/DL (ref 30–36.5)
MCV RBC AUTO: 90.2 FL (ref 80–99)
MCV RBC AUTO: 90.7 FL (ref 80–99)
MONOCYTES # BLD: 0.8 K/UL (ref 0–1)
MONOCYTES # BLD: 1.4 K/UL (ref 0–1)
MONOCYTES NFR BLD: 11 % (ref 5–13)
MONOCYTES NFR BLD: 7 % (ref 5–13)
MV DEC SLOPE: 1.22
NEUTS SEG # BLD: 8.6 K/UL (ref 1.8–8)
NEUTS SEG # BLD: 9 K/UL (ref 1.8–8)
NEUTS SEG NFR BLD: 71 % (ref 32–75)
NEUTS SEG NFR BLD: 76 % (ref 32–75)
NRBC # BLD: 0 K/UL (ref 0–0.01)
NRBC # BLD: 0 K/UL (ref 0–0.01)
NRBC BLD-RTO: 0 PER 100 WBC
NRBC BLD-RTO: 0 PER 100 WBC
P-R INTERVAL, ECG05: 168 MS
PCO2 BLDA: 35 MMHG (ref 35–45)
PH BLDA: 7.38 [PH] (ref 7.35–7.45)
PISA AR MAX VEL: 195.69 CM/S
PLATELET # BLD AUTO: 208 K/UL (ref 150–400)
PLATELET # BLD AUTO: 213 K/UL (ref 150–400)
PMV BLD AUTO: 9.1 FL (ref 8.9–12.9)
PMV BLD AUTO: 9.1 FL (ref 8.9–12.9)
PO2 BLDA: 126 MMHG (ref 80–100)
POTASSIUM SERPL-SCNC: 4.4 MMOL/L (ref 3.5–5.1)
PROT SERPL-MCNC: 6.9 G/DL (ref 6.4–8.2)
Q-T INTERVAL, ECG07: 428 MS
QRS DURATION, ECG06: 144 MS
QTC CALCULATION (BEZET), ECG08: 477 MS
RBC # BLD AUTO: 4.94 M/UL (ref 4.1–5.7)
RBC # BLD AUTO: 5.3 M/UL (ref 4.1–5.7)
RSV RNA SPEC QL NAA+PROBE: NOT DETECTED
RV+EV RNA SPEC QL NAA+PROBE: NOT DETECTED
SAMPLES BEING HELD,HOLD: NORMAL
SAMPLES BEING HELD,HOLD: NORMAL
SAO2 % BLD: 98 % (ref 92–97)
SAO2% DEVICE SAO2% SENSOR NAME: ABNORMAL
SODIUM SERPL-SCNC: 135 MMOL/L (ref 136–145)
SPECIMEN SITE: ABNORMAL
THERAPEUTIC RANGE,PTTT: ABNORMAL SECS (ref 58–77)
THERAPEUTIC RANGE,PTTT: ABNORMAL SECS (ref 58–77)
THERAPEUTIC RANGE,PTTT: NORMAL SECS (ref 58–77)
TRIGL SERPL-MCNC: 213 MG/DL (ref ?–150)
TROPONIN I SERPL-MCNC: 0.08 NG/ML
TROPONIN I SERPL-MCNC: 0.52 NG/ML
TROPONIN I SERPL-MCNC: 0.68 NG/ML
VENTILATION MODE VENT: ABNORMAL
VENTRICULAR RATE, ECG03: 75 BPM
VLDLC SERPL CALC-MCNC: 42.6 MG/DL
WBC # BLD AUTO: 11.5 K/UL (ref 4.1–11.1)
WBC # BLD AUTO: 12.7 K/UL (ref 4.1–11.1)

## 2019-10-22 PROCEDURE — 83036 HEMOGLOBIN GLYCOSYLATED A1C: CPT

## 2019-10-22 PROCEDURE — 74011250636 HC RX REV CODE- 250/636: Performed by: STUDENT IN AN ORGANIZED HEALTH CARE EDUCATION/TRAINING PROGRAM

## 2019-10-22 PROCEDURE — 5A09357 ASSISTANCE WITH RESPIRATORY VENTILATION, LESS THAN 24 CONSECUTIVE HOURS, CONTINUOUS POSITIVE AIRWAY PRESSURE: ICD-10-PCS | Performed by: INTERNAL MEDICINE

## 2019-10-22 PROCEDURE — 71045 X-RAY EXAM CHEST 1 VIEW: CPT

## 2019-10-22 PROCEDURE — 80053 COMPREHEN METABOLIC PANEL: CPT

## 2019-10-22 PROCEDURE — 65660000000 HC RM CCU STEPDOWN

## 2019-10-22 PROCEDURE — 94660 CPAP INITIATION&MGMT: CPT

## 2019-10-22 PROCEDURE — 74011636320 HC RX REV CODE- 636/320

## 2019-10-22 PROCEDURE — 83880 ASSAY OF NATRIURETIC PEPTIDE: CPT

## 2019-10-22 PROCEDURE — 74011250637 HC RX REV CODE- 250/637: Performed by: STUDENT IN AN ORGANIZED HEALTH CARE EDUCATION/TRAINING PROGRAM

## 2019-10-22 PROCEDURE — 70450 CT HEAD/BRAIN W/O DYE: CPT

## 2019-10-22 PROCEDURE — 85730 THROMBOPLASTIN TIME PARTIAL: CPT

## 2019-10-22 PROCEDURE — 0099U RESPIRATORY PANEL,PCR,NASOPHARYNGEAL: CPT

## 2019-10-22 PROCEDURE — 77030012879 HC MSK CPAP FLL FAC PHIL -B

## 2019-10-22 PROCEDURE — 74011000250 HC RX REV CODE- 250: Performed by: INTERNAL MEDICINE

## 2019-10-22 PROCEDURE — 93306 TTE W/DOPPLER COMPLETE: CPT

## 2019-10-22 PROCEDURE — 83735 ASSAY OF MAGNESIUM: CPT

## 2019-10-22 PROCEDURE — 36415 COLL VENOUS BLD VENIPUNCTURE: CPT

## 2019-10-22 PROCEDURE — 96374 THER/PROPH/DIAG INJ IV PUSH: CPT

## 2019-10-22 PROCEDURE — 85025 COMPLETE CBC W/AUTO DIFF WBC: CPT

## 2019-10-22 PROCEDURE — 74011250636 HC RX REV CODE- 250/636: Performed by: EMERGENCY MEDICINE

## 2019-10-22 PROCEDURE — 71275 CT ANGIOGRAPHY CHEST: CPT

## 2019-10-22 PROCEDURE — 36600 WITHDRAWAL OF ARTERIAL BLOOD: CPT

## 2019-10-22 PROCEDURE — 80061 LIPID PANEL: CPT

## 2019-10-22 PROCEDURE — 99285 EMERGENCY DEPT VISIT HI MDM: CPT

## 2019-10-22 PROCEDURE — 74011250636 HC RX REV CODE- 250/636: Performed by: INTERNAL MEDICINE

## 2019-10-22 PROCEDURE — 84484 ASSAY OF TROPONIN QUANT: CPT

## 2019-10-22 PROCEDURE — 93005 ELECTROCARDIOGRAM TRACING: CPT

## 2019-10-22 PROCEDURE — 82803 BLOOD GASES ANY COMBINATION: CPT

## 2019-10-22 PROCEDURE — 74011250637 HC RX REV CODE- 250/637: Performed by: EMERGENCY MEDICINE

## 2019-10-22 RX ORDER — CARVEDILOL 6.25 MG/1
25 TABLET ORAL 2 TIMES DAILY WITH MEALS
Status: DISCONTINUED | OUTPATIENT
Start: 2019-10-22 | End: 2019-10-24 | Stop reason: HOSPADM

## 2019-10-22 RX ORDER — LOSARTAN POTASSIUM 50 MG/1
100 TABLET ORAL DAILY
Status: DISCONTINUED | OUTPATIENT
Start: 2019-10-22 | End: 2019-10-24 | Stop reason: HOSPADM

## 2019-10-22 RX ORDER — HEPARIN SODIUM 5000 [USP'U]/ML
2000 INJECTION, SOLUTION INTRAVENOUS; SUBCUTANEOUS ONCE
Status: COMPLETED | OUTPATIENT
Start: 2019-10-22 | End: 2019-10-22

## 2019-10-22 RX ORDER — HEPARIN SODIUM 10000 [USP'U]/ML
30 INJECTION, SOLUTION INTRAVENOUS; SUBCUTANEOUS ONCE
Status: DISCONTINUED | OUTPATIENT
Start: 2019-10-22 | End: 2019-10-22

## 2019-10-22 RX ORDER — MEMANTINE HYDROCHLORIDE 10 MG/1
10 TABLET ORAL 2 TIMES DAILY
COMMUNITY

## 2019-10-22 RX ORDER — SODIUM CHLORIDE 0.9 % (FLUSH) 0.9 %
5-40 SYRINGE (ML) INJECTION AS NEEDED
Status: DISCONTINUED | OUTPATIENT
Start: 2019-10-22 | End: 2019-10-24 | Stop reason: HOSPADM

## 2019-10-22 RX ORDER — FUROSEMIDE 10 MG/ML
40 INJECTION INTRAMUSCULAR; INTRAVENOUS
Status: COMPLETED | OUTPATIENT
Start: 2019-10-22 | End: 2019-10-22

## 2019-10-22 RX ORDER — NORTRIPTYLINE HYDROCHLORIDE 10 MG/1
20 CAPSULE ORAL
COMMUNITY

## 2019-10-22 RX ORDER — FAMOTIDINE 20 MG/1
20 TABLET, FILM COATED ORAL 2 TIMES DAILY
Status: DISCONTINUED | OUTPATIENT
Start: 2019-10-22 | End: 2019-10-24 | Stop reason: HOSPADM

## 2019-10-22 RX ORDER — SODIUM CHLORIDE 0.9 % (FLUSH) 0.9 %
5-40 SYRINGE (ML) INJECTION EVERY 8 HOURS
Status: DISCONTINUED | OUTPATIENT
Start: 2019-10-22 | End: 2019-10-24 | Stop reason: HOSPADM

## 2019-10-22 RX ORDER — ACETAMINOPHEN/DIPHENHYDRAMINE 500MG-25MG
2 TABLET ORAL
COMMUNITY
End: 2021-03-11 | Stop reason: ALTCHOICE

## 2019-10-22 RX ORDER — BUMETANIDE 0.25 MG/ML
1 INJECTION INTRAMUSCULAR; INTRAVENOUS EVERY 12 HOURS
Status: DISCONTINUED | OUTPATIENT
Start: 2019-10-22 | End: 2019-10-24

## 2019-10-22 RX ORDER — ASPIRIN 81 MG/1
81 TABLET ORAL
Status: DISCONTINUED | OUTPATIENT
Start: 2019-10-22 | End: 2019-10-24 | Stop reason: HOSPADM

## 2019-10-22 RX ORDER — HEPARIN SODIUM 10000 [USP'U]/100ML
9-25 INJECTION, SOLUTION INTRAVENOUS
Status: DISCONTINUED | OUTPATIENT
Start: 2019-10-22 | End: 2019-10-23

## 2019-10-22 RX ORDER — HEPARIN SODIUM 5000 [USP'U]/ML
4000 INJECTION, SOLUTION INTRAVENOUS; SUBCUTANEOUS ONCE
Status: COMPLETED | OUTPATIENT
Start: 2019-10-22 | End: 2019-10-22

## 2019-10-22 RX ORDER — ACETAMINOPHEN 325 MG/1
650 TABLET ORAL
Status: COMPLETED | OUTPATIENT
Start: 2019-10-22 | End: 2019-10-22

## 2019-10-22 RX ORDER — SIMVASTATIN 20 MG/1
20 TABLET, FILM COATED ORAL
Status: DISCONTINUED | OUTPATIENT
Start: 2019-10-22 | End: 2019-10-24 | Stop reason: HOSPADM

## 2019-10-22 RX ADMIN — Medication 10 ML: at 06:04

## 2019-10-22 RX ADMIN — IOPAMIDOL 100 ML: 755 INJECTION, SOLUTION INTRAVENOUS at 14:19

## 2019-10-22 RX ADMIN — SIMVASTATIN 20 MG: 20 TABLET, FILM COATED ORAL at 21:13

## 2019-10-22 RX ADMIN — NITROGLYCERIN 1 INCH: 20 OINTMENT TOPICAL at 09:17

## 2019-10-22 RX ADMIN — HEPARIN SODIUM 4000 UNITS: 5000 INJECTION INTRAVENOUS; SUBCUTANEOUS at 10:19

## 2019-10-22 RX ADMIN — CARVEDILOL 25 MG: 12.5 TABLET, FILM COATED ORAL at 10:06

## 2019-10-22 RX ADMIN — ACETAMINOPHEN 650 MG: 325 TABLET ORAL at 02:15

## 2019-10-22 RX ADMIN — Medication 10 ML: at 21:20

## 2019-10-22 RX ADMIN — BUMETANIDE 1 MG: 0.25 INJECTION INTRAMUSCULAR; INTRAVENOUS at 06:04

## 2019-10-22 RX ADMIN — BUMETANIDE 1 MG: 0.25 INJECTION INTRAMUSCULAR; INTRAVENOUS at 21:12

## 2019-10-22 RX ADMIN — LOSARTAN POTASSIUM 100 MG: 50 TABLET, FILM COATED ORAL at 10:07

## 2019-10-22 RX ADMIN — ASPIRIN 81 MG: 81 TABLET, COATED ORAL at 21:13

## 2019-10-22 RX ADMIN — CARVEDILOL 25 MG: 12.5 TABLET, FILM COATED ORAL at 16:10

## 2019-10-22 RX ADMIN — FAMOTIDINE 20 MG: 20 TABLET ORAL at 18:25

## 2019-10-22 RX ADMIN — HEPARIN SODIUM 9 UNITS/KG/HR: 10000 INJECTION, SOLUTION INTRAVENOUS at 10:17

## 2019-10-22 RX ADMIN — FAMOTIDINE 20 MG: 20 TABLET ORAL at 10:07

## 2019-10-22 RX ADMIN — FUROSEMIDE 40 MG: 10 INJECTION, SOLUTION INTRAMUSCULAR; INTRAVENOUS at 02:15

## 2019-10-22 RX ADMIN — Medication 10 ML: at 15:43

## 2019-10-22 RX ADMIN — HEPARIN SODIUM 2000 UNITS: 5000 INJECTION INTRAVENOUS; SUBCUTANEOUS at 16:09

## 2019-10-22 NOTE — ED TRIAGE NOTES
Patient presents to ED via EMS with c/o chest tightness and acute onset SOB. On EMS arrival, pt's SpO2 was 85% on RA and pt was sitting forward to get air. Pt reported coughing up sputum, unsure of what color. EMS noted crackles bilaterally. Pt placed on bipap prior to arrival.   EKG showed LBB for EMS. Pt c/o chest tightness and received aspirin and 2in nitroglycerin. Pt coughing during triage.

## 2019-10-22 NOTE — H&P
[facility]  [address] [facility], [facility] [address]  [phone]    Admission History and Physical      NAME:  Sonali Crowell   :   1935   MRN:  [id number]     PCP:  Artemio Bowen MD     Date/Time:  10/22/2019         Subjective:     CHIEF COMPLAINT: chest pain and SOB      HISTORY OF PRESENT ILLNESS:     Mr. Lashell Mott is a 80 y.o.  male who is admitted with chest pain/ SOB. Mr. Lashell Mott with PMH of PAF, CM, HTN, hypothyroidism, hyperlipidemia, LBBB, presented to ER c/o chest pain and SOB. He started to have about 2 days ago associated with SOB about 2 days ago and have been intermittent. Yesterday night around 10 PM started to have severe sharp chest pain, which is constant without radiation. It is associated with SOB. When EMS arrived, pt was severely SOB and his SAO2 was 85%. He was put on BiPAP and was brought to ER. His chest pain got better after NTG SL. Past Medical History:   Diagnosis Date    Arthritis     Asbestos exposure     CAD (coronary artery disease)     non-obstructive CAD    Cancer ([facility] [address]     prostate    Cardiomyopathy ([facility] [address]     LVEF improved     Chronic anticoagulation 10/02/2016    Eliquis    Gout     High cholesterol     Hypertension     LBBB (left bundle branch block)     Memory deficit     PAF (paroxysmal atrial fibrillation) (HCC)     Seizures (HCC)     Vertigo         Past Surgical History:   Procedure Laterality Date    HX HERNIA REPAIR      HX MOHS PROCEDURES Right     HX PROSTATECTOMY      HX UROLOGICAL      penile implant       Social History     Tobacco Use    Smoking status: Former Smoker    Smokeless tobacco: Never Used   Substance Use Topics    Alcohol use: No        Family History   Problem Relation Age of Onset    Cancer Father         No Known Allergies     Prior to Admission medications    Medication Sig Start Date End Date Taking?  Authorizing Provider   losartan (COZAAR) 100 mg tablet Take 1 Tab by mouth daily. 5/28/19  Yes Lesvia Davis MD   famotidine (PEPCID) 20 mg tablet Take 20 mg by mouth two (2) times a day. Yes Provider, Historical   memantine ER (NAMENDA XR) 28 mg capsule Take 28 mg by mouth daily. Yes Provider, Historical   levothyroxine (SYNTHROID) 75 mcg tablet Take 75 mcg by mouth Daily (before breakfast). Yes Provider, Historical   carvedilol (COREG) 25 mg tablet TAKE ONE TABLET BY MOUTH TWICE A DAY WITH MEALS 8/18/14  Yes Rafaela Santiago MD   loratadine (CLARITIN) 10 mg tablet Take 10 mg by mouth daily. Yes Provider, Historical   polyethylene glycol (MIRALAX) 17 gram packet Take 17 g by mouth daily. Yes Provider, Historical   simvastatin (ZOCOR) 20 mg tablet Take 20 mg by mouth nightly. 6/3/11  Yes Provider, Historical   aspirin 81 mg tablet Take 81 mg by mouth nightly. 6/3/11  Yes Provider, Historical   MULTIVITAMIN PO Take 1 Tab by mouth daily. 6/3/11  Yes Provider, Historical   DUREZOL 0.05 % ophthalmic emulsion  3/19/19   Provider, Historical   traZODone (DESYREL) 50 mg tablet Take 50 mg by mouth nightly. 10/5/16   Diego Clark MD   CALCIUM CARBONATE (CALCIUM 500 PO) Take 1 Tab by mouth daily. Provider, Historical   psyllium (METAMUCIL) packet Take 1 Packet by mouth daily.     Provider, Historical         Review of Systems:  (bold if positive, if negative)    Gen:  Eyes:  ENT:  CVS:   chest paiPulm:   dyspneaGI:    :    MS:  Skin:  Psych:  Endo:    Hem:  Renal:    Neuro:            Objective:      VITALS:    Vital signs reviewed; most recent are:    Visit Vitals  /73   Pulse 68   Temp 97.6 °F (36.4 °C)   Resp 21   Ht 5' 9\" (1.753 m)   Wt 103 kg (227 lb)   SpO2 96%   BMI 33.52 kg/m²     SpO2 Readings from Last 6 Encounters:   10/22/19 96%   04/26/19 95%   12/13/18 97%   12/22/17 98%   06/22/17 98%   12/16/16 99%        No intake or output data in the 24 hours ending 10/22/19 0216         Exam:     Physical Exam:    Gen:  Well-developed, well-nourished, in no acute distress  HEENT:  Pink conjunctivae, PERRL, hearing intact to voice, moist mucous membranes  Neck:  Supple, without masses, thyroid non-tender  Resp:  No accessory muscle use, rales at the bases. Card:  No murmurs, normal S1, S2 without thrills, bruits or peripheral edema  Abd:  Soft, non-tender, non-distended, normoactive bowel sounds are present, no palpable organomegaly and no detectable hernias  Lymph:  No cervical or inguinal adenopathy  Musc:  No cyanosis or clubbing  Skin:  No rashes or ulcers, skin turgor is good  Neuro:  Cranial nerves are grossly intact, no focal motor weakness, follows commands appropriately  Psych:  Good insight, oriented to person, place and time, alert       Labs:    Recent Labs     10/22/19  0106   WBC 11.5*   HGB 15.0   HCT 44.8        Recent Labs     10/22/19  0106   *   K 4.4      CO2 21   *   BUN 19   CREA 1.28   CA 8.3*   MG 2.1   ALB 3.3*   TBILI 0.4   SGOT 20   ALT 16     Lab Results   Component Value Date/Time    Glucose (POC) 100 05/05/2014 10:35 AM     Recent Labs     10/22/19  0115   PH 7.38   PCO2 35   PO2 126*   HCO3 20*   FIO2 55     No results for input(s): INR, INREXT in the last 72 hours. Telemetry reviewed:   LBBB       Assessment/Plan:    1. Chest pain (10/22/2019)/ NSTEMI/ elevated troponin/ Hx of CAD/ CM. Admit to stepdown. Check serial cardiac enzymes and echocardiogram. Continue NTG and ASA ( couldn't start anticoagulation due to Hx of GI bleed. Pt was taken off anticoagulation due to GI bleed). Consult cardiology. Keep NPO in case cardiac catheterization will be done     2. SOB (shortness of breath) (10/22/2019)/ CHF exacerbation (HCC) (10/22/2019)/ acute hypoxic respiratory failure. likely cause by above. Check echo, daily weight and I/O. Started on bumex. His last echo showed EF of 55% with grade one diastolic dysfunction. Continue BiPAP ans supplement O2 to keep SAO2 > 90%     3.   PAF (paroxysmal atrial fibrillation) / LBBB (left bundle branch block). off Eliquis due to GI bleed. On ASA      4. Acquired hypothyroidism (5/5/2014). continue synthroid     5. HTN (hypertension), benign (5/5/2014). Continue home meds after med rec completed. 6.  Esophageal reflux (5/5/2014). continue pepcid. 7.  High cholesterol. Continue statin. Check lipid panel     8. Obesity (4/26/2019). Would benefit from weight loss    9. Dementia, mild- Resume memantine    10. Hyperglycemia. No hx of DM.  Check A1c    Code status: Full ( decision make: son)        Previous medical records reviewed     Risk of deterioration: high      Total time spent with patient: [address] discussed with: Patient, Family, Nursing Staff and >50% of time spent in counseling and coordination of care    Discussed:  Care Plan    Prophylaxis:  SCD's    Probable Disposition:  Home w/Family           ___________________________________________________    Attending Physician: Cortney Mcleod MD

## 2019-10-22 NOTE — CONSULTS
[facility]     Name: Janee Taylor MRN: [id number]   : 1935 Hospital: [address]   Date: 10/22/2019        Impression Plan   1. Acute respiratory failure  2. Hypoxia  3. Chest pain  4. Mild pulm edema  5. Mild leukocytosis  6. Headache- from NTG               · bipap break  · CTA chest since no clear etiology for chest pain at this point  · RVP  · ASA  · Mild diuresis  · Coreg/losartan/zocor  · NTG  · Discussed with cardiology  · Updated son at bedside         Radiology  ( personally reviewed) CXR reviewed: mild pulmonary edema, if any   ABG No results for input(s): PHI, PO2I, PCO2I in the last 72 hours. Subjective     Cc: chest pain    79 yo with PMHX of CAD and cardiomyopathy presenting with chest pain for 2 days. Progressive in nature. No pleuritic chest pains. Denies fevers/chills/leg edema. Found to be 85% on RA on arrival. Currently on bipap support. Pt has not smoked since age 25. Has no hx of lung dz. Echo with ef of 50-55% with mild diffuse hypokinesis. Review of Systems:  A comprehensive review of systems was negative except for that written in the HPI. Past Medical History:   Diagnosis Date    Arthritis     Asbestos exposure     CAD (coronary artery disease)     non-obstructive CAD    Cancer ([address]     prostate    Cardiomyopathy ([facility] [address]     LVEF improved     Chronic anticoagulation 10/02/2016    Eliquis    Gout     High cholesterol     Hypertension     LBBB (left bundle branch block)     Memory deficit     PAF (paroxysmal atrial fibrillation) (HCC)     Seizures (HCC)     Vertigo       Past Surgical History:   Procedure Laterality Date    HX HERNIA REPAIR      HX MOHS PROCEDURES Right     HX PROSTATECTOMY      HX UROLOGICAL      penile implant      Prior to Admission medications    Medication Sig Start Date End Date Taking? Authorizing Provider   losartan (COZAAR) 100 mg tablet Take 1 Tab by mouth daily.  19  Yes Ryan Chente Lopez MD   famotidine (PEPCID) 20 mg tablet Take 20 mg by mouth two (2) times a day. Yes Provider, Historical   memantine ER (NAMENDA XR) 28 mg capsule Take 28 mg by mouth daily. Yes Provider, Historical   levothyroxine (SYNTHROID) 75 mcg tablet Take 75 mcg by mouth Daily (before breakfast). Yes Provider, Historical   carvedilol (COREG) 25 mg tablet TAKE ONE TABLET BY MOUTH TWICE A DAY WITH MEALS 8/18/14  Yes Salud Santiago MD   loratadine (CLARITIN) 10 mg tablet Take 10 mg by mouth daily. Yes Provider, Historical   polyethylene glycol (MIRALAX) 17 gram packet Take 17 g by mouth daily. Yes Provider, Historical   simvastatin (ZOCOR) 20 mg tablet Take 20 mg by mouth nightly. 6/3/11  Yes Provider, Historical   aspirin 81 mg tablet Take 81 mg by mouth nightly. 6/3/11  Yes Provider, Historical   MULTIVITAMIN PO Take 1 Tab by mouth daily. 6/3/11  Yes Provider, Historical   DUREZOL 0.05 % ophthalmic emulsion  3/19/19   Provider, Historical   traZODone (DESYREL) 50 mg tablet Take 50 mg by mouth nightly. 10/5/16   Panchito Nina MD   CALCIUM CARBONATE (CALCIUM 500 PO) Take 1 Tab by mouth daily. Provider, Historical   psyllium (METAMUCIL) packet Take 1 Packet by mouth daily.     Provider, Historical     Current Facility-Administered Medications   Medication Dose Route Frequency    sodium chloride (NS) flush 5-40 mL  5-40 mL IntraVENous Q8H    bumetanide (BUMEX) injection 1 mg  1 mg IntraVENous Q12H    aspirin tablet 81 mg  81 mg Oral QHS    carvedilol (COREG) tablet 25 mg  25 mg Oral BID WITH MEALS    famotidine (PEPCID) tablet 20 mg  20 mg Oral BID    losartan (COZAAR) tablet 100 mg  100 mg Oral DAILY    simvastatin (ZOCOR) tablet 20 mg  20 mg Oral QHS     No Known Allergies   Social History     Tobacco Use    Smoking status: Former Smoker    Smokeless tobacco: Never Used   Substance Use Topics    Alcohol use: No      Family History   Problem Relation Age of Onset    Cancer Father Laboratory: I have personally reviewed the critical care flowsheet and labs.      Recent Labs     10/22/19  0106   WBC 11.5*   HGB 15.0   HCT 44.8        Recent Labs     10/22/19  0106   *   K 4.4      CO2 21   *   BUN 19   CREA 1.28   CA 8.3*   MG 2.1   ALB 3.3*   SGOT 20   ALT 16       Objective:     Mode Rate Tidal Volume Pressure FiO2 PEEP            40 %       Vital Signs:     TMAX(24)      Intake/Output:   Last shift:      Ventilator Volumes  Vt Spont (ml): 445 ml  Ve Observed (l/min): 9.1 l/min  Last 3 shifts: 10/22 0701 - 10/22 1900  In: -   Out: 400 [Urine:400]RRIOLAST3    Intake/Output Summary (Last 24 hours) at 10/22/2019 0835  Last data filed at 10/22/2019 3554  Gross per 24 hour   Intake --   Output 1400 ml   Net -1400 ml     EXAM:   GENERAL: well developed, on bipap HEENT:  PERRL, EOMI, no alar flaring or epistaxis, oral mucosa moist without cyanosis, NECK:  no jugular vein distention, no retractions, no thyromegaly or masses, LUNGS: CTA, no w/r/r , HEART:  Regular rate and rhythm with no MGR; no edema is present, ABDOMEN:  soft with no tenderness, bowel sounds present, EXTREMITIES:  warm with no cyanosis, SKIN:  no jaundice or ecchymosis and NEUROLOGIC:  alert and oriented, grossly non-focal    Liam Johnson MD  [facility]

## 2019-10-22 NOTE — CONSULTS
Shila Daley DO  [facility] [address] [place] [address], [address] [facility], [address] [place] [address] Nw                                       Office [phone],VCM [phone]  Office [phone],VCD [phone]      Date of  Admission: 10/22/2019 12:44 AM  PCP- Rosa M Anderson MD    Mykel Noguera is a 80 y.o. male admitted for CHF exacerbation ([facility] [address] [I50.9]; Chest pain [R07.9]. Consult requested by Eliceo Jackson MD    Assessment/Plan      Acute hypoxic respiratory failure  Pulmonary edema  ADHF, hx of HFpEF  Chest pain, probable ACS, minimal elevation in troponin with WMA on echocardiogram  Hx of non-obstructive CAD  pAF- in SR, not on AC due to GI bleeding  Hx of GI bleeding    CTA of chest pending today    Recommend cardiac catheterization due to concern for WMA on echo in the setting of ACS, will plan for tomorrow morning due to dye load with CTA. No emergent catheterization required at this time. Cont home asa, losartan, coreg, statin  Heparin gtt for ACS, monitor for bleeding  Nitro paste  IV diuresis  Ween bipap as tolerated    Dr. Taylor Yates to follow in am         I appreciate the opportunity to be involved in Mr. Ríos. See below note for details. Please do not hesitate to contact us with questions or concerns. Gena Dasilva DO      Subjective:  Mykel Noguera is a 80 y.o. male presents with acute hypoxic respiratory failure. He started developing shortness of breath 2-3 days ago, which progressed. Associated with his dyspnea was chest discomfort. He describes as both pressure and sharp in nature. Only other compliant is headache, CT in ED without evidence of acute event. ECG shows sinus rhythm with LBBB (old). CXR with mild pulmonary edema. Upon arrival to ED he was on bipap from EMS due to SaO2 of 85%.   He remains on bipap this am.  Echo was being performed when I was bedside, appears to have inferior wall hypokinesis, mild-moderate MR, LAE with LVEF in the 40s. Troponin minimally elevated. Past Medical History:   Diagnosis Date    Arthritis     Asbestos exposure     CAD (coronary artery disease)     non-obstructive CAD    Cancer ([facility] [address]     prostate    Cardiomyopathy ([facility] [address]     LVEF improved     Chronic anticoagulation 10/02/2016    Eliquis    Gout     High cholesterol     Hypertension     LBBB (left bundle branch block)     Memory deficit     PAF (paroxysmal atrial fibrillation) (HCC)     Seizures (HCC)     Vertigo       Past Surgical History:   Procedure Laterality Date    HX HERNIA REPAIR      HX MOHS PROCEDURES Right     HX PROSTATECTOMY      HX UROLOGICAL      penile implant     No Known Allergies  Family History   Problem Relation Age of Onset    Cancer Father       Social History     Tobacco Use    Smoking status: Former Smoker    Smokeless tobacco: Never Used   Substance Use Topics    Alcohol use: No    Drug use: No          Medications:  Medications Prior to Admission   Medication Sig    losartan (COZAAR) 100 mg tablet Take 1 Tab by mouth daily.  famotidine (PEPCID) 20 mg tablet Take 20 mg by mouth two (2) times a day.  memantine ER (NAMENDA XR) 28 mg capsule Take 28 mg by mouth daily.  levothyroxine (SYNTHROID) 75 mcg tablet Take 75 mcg by mouth Daily (before breakfast).  carvedilol (COREG) 25 mg tablet TAKE ONE TABLET BY MOUTH TWICE A DAY WITH MEALS    loratadine (CLARITIN) 10 mg tablet Take 10 mg by mouth daily.  polyethylene glycol (MIRALAX) 17 gram packet Take 17 g by mouth daily.  simvastatin (ZOCOR) 20 mg tablet Take 20 mg by mouth nightly.  aspirin 81 mg tablet Take 81 mg by mouth nightly.  MULTIVITAMIN PO Take 1 Tab by mouth daily.  DUREZOL 0.05 % ophthalmic emulsion     traZODone (DESYREL) 50 mg tablet Take 50 mg by mouth nightly.  CALCIUM CARBONATE (CALCIUM 500 PO) Take 1 Tab by mouth daily.  psyllium (METAMUCIL) packet Take 1 Packet by mouth daily.      Current Facility-Administered Medications   Medication Dose Route Frequency    sodium chloride (NS) flush 5-40 mL  5-40 mL IntraVENous Q8H    sodium chloride (NS) flush 5-40 mL  5-40 mL IntraVENous PRN    bumetanide (BUMEX) injection 1 mg  1 mg IntraVENous Q12H    aspirin tablet 81 mg  81 mg Oral QHS    carvedilol (COREG) tablet 25 mg  25 mg Oral BID WITH MEALS    famotidine (PEPCID) tablet 20 mg  20 mg Oral BID    losartan (COZAAR) tablet 100 mg  100 mg Oral DAILY    simvastatin (ZOCOR) tablet 20 mg  20 mg Oral QHS    nitroglycerin (NITROBID) 2 % ointment 1 Inch  1 Inch Topical NOW         Review of Systems:  Pertinent Positive: chest pain, dyspnea  Pertinent Negative: no orthopnea, pnd  All Other systems reviewed and are negative for a Comprehensive ROS (10+)        Physical Exam:  Visit Vitals  /81   Pulse 71   Temp 97.1 °F (36.2 °C)   Resp 17   Ht 5' 9\" (1.753 m)   Wt 227 lb 1.2 oz (103 kg)   SpO2 97%   BMI 33.53 kg/m²           Gen: Well-developed, well-nourished, in no acute distress. bipap in place  HEENT:  Pink conjunctivae, hearing intact to voice, moist mucous membranes  Neck: Supple,No JVD, No Carotid Bruit  Resp: Crackles  Card: Normal Rate,Regular Rythm,Normal S1, S2, 2/6 DERRICK  Abd:  Soft, non-tender, non-distended, normoactive bowel sounds are present   MSK: No cyanosis or clubbing  Skin: No rashes or ulcers  Neuro:  Cranial nerves are grossly intact, moving all four extremities, no focal deficit, follows commands appropriately  Psych:  Good insight, oriented to person, place and time, alert, Nml Affect  LE: trace edema  Vascular:Distal Pulses 2+ and symmetric          EKG: normal sinus rhythm, LBBB    Cardiac Testing/ Procedures:    Lipids 1/26/19 - , , HDL 39, VLDL 29, LDL 63    Lipids 2/2/18: , HDL 33, LDL 40, , VLDL 53    Cardiac Testing/ Procedures:     A. Cardiac Cath/PCI: 5/9/14  L Main: MLI  LAD: Med to Large prox ; Mid and distal LAD - small to med;  At D1 - tandem  50% lesions; D1 - small to med - MLI  LCflex: Large; MLI; OM1;OM2 - MLI  RCA: Subselective inj ( WRP catheter) - tortuous; MLI  LVEDP: 10  LVEF: 40%  No significant gradient across aortic valve. RHC findings:  RAP= 2 mmHg  RVSP= 31 mmHg  PAP= 15 mmHg  PCWP= 6 mmHg  CO= 5.61 l/min  CI=2.64  Closure Device: Manual             B. ECHO/KIRT: 1/11/19-EF 31-73%, grade 1 diastolic dysfunction, mildly dilated LAD, PAS P 34 mmHg     12/8/15 Left ventricle: Systolic function was normal by EF (biplane method of  disks). Ejection fraction was estimated to be 50 %. There were no regional  wall motion abnormalities. Doppler parameters were consistent with  abnormal left ventricular relaxation (grade 1 diastolic dysfunction). Left atrium: The atrium was moderately dilated. Mitral valve: There was mild annular calcification. There was mild  regurgitation. Aortic valve: Normal valve structure. Leaflets exhibited mild  calcification and sclerosis. Tricuspid valve: There was moderate regurgitation.     9/12/14 Left ventricle: Systolic function was mildly reduced. Ejection fraction  was estimated to be 50 %. There was moderate diffuse hypokinesis. There  was severe hypokinesis of the basal-mid inferior wall(s). Doppler  parameters were consistent with abnormal left ventricular relaxation  (grade 1 diastolic dysfunction). Left atrium: The atrium was moderately to markedly dilated. Right atrium: The atrium was dilated. Mitral valve: There was mild annular calcification. There was mild  regurgitation. Tricuspid valve: There was mild regurgitation.           5/6/14 Left ventricle: Size was normal. Systolic function was markedly reduced. Ejection fraction was estimated to be 25 %. There was severe diffuse  hypokinesis with distinct regional wall motion abnormalities. Doppler  parameters were consistent with abnormal left ventricular relaxation  (grade 1 diastolic dysfunction). Right ventricle:  The size was normal. Systolic function was normal.    Left atrium: The atrium was mildly dilated. Mitral valve: There was moderate annular calcification. There was mild  regurgitation. Regurgitation grade was 1+ on a scale of 0 to 4+.           C. StressNuclear/Stress ECHO/Stress test: 1/11/19-normal Nila nuclear study.  EF 43%         STRESS: 1/21/13,: no ischemia, EF 62%    ECHO: 5/6/2014: EF 25%, severe diffuse HK w/ WMA, grade 1 DD, LAE, mod MAC, mild MR:    LABS:    Lab Results   Component Value Date/Time    WBC 11.5 (H) 10/22/2019 01:06 AM    HGB 15.0 10/22/2019 01:06 AM    HCT 44.8 10/22/2019 01:06 AM    PLATELET 600 90/07/3184 01:06 AM    MCV 90.7 10/22/2019 01:06 AM     Lab Results   Component Value Date/Time    Sodium 135 (L) 10/22/2019 01:06 AM    Potassium 4.4 10/22/2019 01:06 AM    Chloride 104 10/22/2019 01:06 AM    CO2 21 10/22/2019 01:06 AM    Anion gap 10 10/22/2019 01:06 AM    Glucose 167 (H) 10/22/2019 01:06 AM    BUN 19 10/22/2019 01:06 AM    Creatinine 1.28 10/22/2019 01:06 AM    BUN/Creatinine ratio 15 10/22/2019 01:06 AM    GFR est AA >60 10/22/2019 01:06 AM    GFR est non-AA 54 (L) 10/22/2019 01:06 AM    Calcium 8.3 (L) 10/22/2019 01:06 AM     Lab Results   Component Value Date/Time     (H) 05/07/2014 04:50 AM    CK-MB Index 0.4 05/06/2014 09:55 AM    Troponin-I, Qt. 0.68 (H) 10/22/2019 06:35 AM     Lab Results   Component Value Date/Time    aPTT 25.7 05/05/2014 10:30 AM     Lab Results   Component Value Date/Time    INR 1.1 10/02/2016 09:16 AM    INR 1.0 05/05/2014 10:30 AM    Prothrombin time 10.8 10/02/2016 09:16 AM    Prothrombin time 10.5 05/05/2014 10:30 AM           Albino Rios DO

## 2019-10-22 NOTE — Clinical Note
TRANSFER - IN REPORT:  
 
Verbal report received from: SANDRA Kamara. Report consisted of patient's Situation, Background, Assessment and  
Recommendations(SBAR). Opportunity for questions and clarification was provided. Assessment completed upon patient's arrival to unit and care assumed. Patient transported with a Registered Nurse, Monitor and Oxygen. Oxygen used for patient = nasal cannula, @ 2 - 6 Liters.

## 2019-10-22 NOTE — Clinical Note
Patient transported with a Registered Nurse and Oxygen. Oxygen used for patient = @ 2 - 6 Liters.    
Verbal report to be given bedside to RN

## 2019-10-22 NOTE — ED NOTES
Pt Throughput: Charge Nurse on ICU  made aware of patient's bed assignment, room 7. Cuate Che, RN  Emergency Dept Charge RN.

## 2019-10-22 NOTE — ED NOTES
TRANSFER - OUT REPORT:    Verbal report given to Mikhail Younger RN(name) on Janee Taylor  being transferred to ICU/3007(unit) for routine progression of care       Report consisted of patients Situation, Background, Assessment and   Recommendations(SBAR). Information from the following report(s) SBAR, Kardex, ED Summary, [facility] and Recent Results was reviewed with the receiving nurse. Lines:   Peripheral IV 10/22/19 Left Forearm (Active)   Site Assessment Clean, dry, & intact 10/22/2019  1:15 AM   Phlebitis Assessment 0 10/22/2019  1:15 AM   Infiltration Assessment 0 10/22/2019  1:15 AM   Dressing Status Clean, dry, & intact 10/22/2019  1:15 AM   Dressing Type Tape;Transparent 10/22/2019  1:15 AM   Hub Color/Line Status Flushed;Green;Patent 10/22/2019  1:15 AM   Action Taken Blood drawn 10/22/2019  1:15 AM        Opportunity for questions and clarification was provided.       Patient transported with:   Monitor  O2 @ (BIPAP) liters  Registered Nurse

## 2019-10-22 NOTE — PROGRESS NOTES
Advance Care Planning (ACP) Provider Note - Comprehensive      Date of ACP Conversation:  10/22/19    Persons included in Conversation:  patient   Length of ACP Conversation in minutes:  16 minutes     Authorized Decision Maker (if patient is incapable of making informed decisions): This person is: Mr Anamaria Campa for ALL Patients with Decision Making Capacity:  Importance of advance care planning, including choosing a healthcare agent to communicate patient's healthcare decisions if patient lost the ability to make decisions. Review of Existing Advance Directive:  Patient and family do not have a current advance directive however pt confirms that his wife would serve as his MPOA     Active Diagnoses:   NSTEMI/ CHF    These active diagnoses are of sufficient risk that focused discussion on advance care planning is indicated in order to allow the patient to thoughtfully consider personal goals of care; and, if situations arise that prevent the ability to personally give input, to ensure appropriate representation of their personal desires for different levels and aggressiveness of care. For Serious or Chronic Illness:  Understanding of medical condition     Reviewed pt's clinical status. Jermaine Tobias has multiple medical problems including CAD, CM, PAF, hypothyroidism, hyperlipidemia and is being admitted for NSTEMI. We reviewed our treatment plan and anticipated discharge plans. Further, we discussed pt's wishes on how he would like to proceed (aggressive/heroic resuscitation vs not intervening and allowing nature takes its course) if he were to suffer cardiopulmonary arrest.    Understanding of CPR, goals and expected outcomes, benefits and burdens discussed. Information on CPR success provided (many factors lower a patients chance of survival, including advanced age, performance status, malignancy, and presence of multiple comorbidities);  Individual asked to communicate understanding of information in his/her own words. Patient made it very clear to me that he would like to be resuscitated in the event of cardiopulmonary arrest, including chest compressions, defibrillation, intubation/mechanical ventilation. He is alert and oriented. This was discussed with his son Mr Roxanna Rodriguez, who is medical POA.        Interventions Provided:  Referral made for ACP follow-up assistance to: Palliative care

## 2019-10-22 NOTE — PROGRESS NOTES
Reason for Admission:   Chest pain,CHF exacerbation,acute hypoxic respiratory failure,ACS                  RRAT Score:   20               Do you (patient/family) have any concerns for transition/discharge? Not at this time              Plan for utilizing home health:   Hospital to home CHF visit is an option for pt depending on the outcome of his cardiac catherization tomorrow    Current Advanced Directive/Advance Care Plan:  Full code,the papers on file are for finances and not an actual AMD            Transition of Care Plan:          I met with pt as an introductory visit. Pt.'s wife is  but pt reports that he has a girlfriend. Pt resides I independent living @ [facility]. Pt states his meals are prepared by [facility]. He states he does not use a walker or cane to ambulate. Pt lives in a one level [facility] with no entry steps. His emergency contact is his son,Maynor @ [phone]. Another son is Flor Rodgers. His number is [phone]. Pt was admitted with chest pain,hypoxic respiratory failure and CHF exacerbation. Pt states he has a history of paroxysmal atrial fib and was on eliquis until he had a GIB . Pt also has a history of CAD, CM,vertigo, asbestos lung disease,and  left bundle branch block. PCP is Dr Kiara Martinez. There are no nurse navigators to notify regarding pt.'s admission. Pt states he does not go to any specialists in the outpatient setting. Plan: 1. At the minimum,pt will likely benefit from a [facility] CHF visit pending outcome tomorrow of his cardiac catherization. 2.I will continue to follow pt for discharge needs.     Lisa Russ

## 2019-10-22 NOTE — ED PROVIDER NOTES
HPI     80-year-old male with a history of coronary artery disease, cardiomyopathy, A. fib possibly on Eliquis, seizures, vertigo, asbestos lung disease, presents to the ED with acute onset of chest pain or shortness of breath. His living girlfriend states he did not feel well yesterday with periods of sweatiness. He states he did not have any chest pain until tonight when he woke up he states he is never had chest pain like this or been short of breath like this. He does have a chronic cough due to asbestos. He does not feel like he was developing a upper respiratory infection no fever. He received 2 inches of Nitropaste and 2 sublingual nitro and states his pain is minimal at this time. He has no calf pain or swelling. He has no nausea or vomiting. Son and girlfriend also report he is complained of a headache all week and is been a little slower and a little dizzier than normal.  On EMS arrival patient was tachypneic and satting 85%. He was placed on BiPAP.     Past Medical History:   Diagnosis Date    Arthritis     Asbestos exposure     CAD (coronary artery disease)     non-obstructive CAD    Cancer ([facility] [address]     prostate    Cardiomyopathy ([facility] [address]     LVEF improved     Chronic anticoagulation 10/02/2016    Eliquis    Gout     High cholesterol     Hypertension     LBBB (left bundle branch block)     Memory deficit     PAF (paroxysmal atrial fibrillation) (HCC)     Seizures (HCC)     Vertigo        Past Surgical History:   Procedure Laterality Date    HX HERNIA REPAIR      HX MOHS PROCEDURES Right     HX PROSTATECTOMY      HX UROLOGICAL      penile implant         Family History:   Problem Relation Age of Onset    Cancer Father        Social History     Socioeconomic History    Marital status:      Spouse name: Not on file    Number of children: Not on file    Years of education: Not on file    Highest education level: Not on file   Occupational History    Not on file   Social Needs  Financial resource strain: Not on file   Commodore-Danis insecurity:     Worry: Not on file     Inability: Not on file    Transportation needs:     Medical: Not on file     Non-medical: Not on file   Tobacco Use    Smoking status: Former Smoker    Smokeless tobacco: Never Used   Substance and Sexual Activity    Alcohol use: No    Drug use: No    Sexual activity: Not on file   Lifestyle    Physical activity:     Days per week: Not on file     Minutes per session: Not on file    Stress: Not on file   Relationships    Social connections:     Talks on phone: Not on file     Gets together: Not on file     Attends Church service: Not on file     Active member of club or organization: Not on file     Attends meetings of clubs or organizations: Not on file     Relationship status: Not on file    Intimate partner violence:     Fear of current or ex partner: Not on file     Emotionally abused: Not on file     Physically abused: Not on file     Forced sexual activity: Not on file   Other Topics Concern    Not on file   Social History Narrative    Not on file         ALLERGIES: Patient has no known allergies. Review of Systems   Constitutional: Positive for fatigue. Negative for fever. HENT: Negative for congestion. Eyes: Negative for visual disturbance. Respiratory: Positive for cough, chest tightness and shortness of breath. Cardiovascular: Positive for chest pain. Negative for palpitations and leg swelling. Gastrointestinal: Negative for abdominal pain, nausea and vomiting. Genitourinary: Negative for dysuria. Musculoskeletal: Negative for gait problem. Skin: Negative for rash. Neurological: Positive for dizziness and headaches. Psychiatric/Behavioral: Negative for dysphoric mood. There were no vitals filed for this visit. Physical Exam   Constitutional: He is oriented to person, place, and time. He appears well-developed and well-nourished. He appears distressed.    HENT: Head: Normocephalic and atraumatic. Mouth/Throat: No oropharyngeal exudate. Eyes: Pupils are equal, round, and reactive to light. Right eye exhibits no discharge. Left eye exhibits no discharge. No scleral icterus. Neck: Normal range of motion. Neck supple. No JVD present. Cardiovascular: Normal rate, regular rhythm and normal heart sounds. No murmur heard. Pulmonary/Chest: Accessory muscle usage present. No stridor. Tachypnea noted. No respiratory distress. He has no wheezes. He has rales. He exhibits no tenderness. Abdominal: Soft. Bowel sounds are normal. He exhibits no distension and no mass. There is no tenderness. There is no rebound and no guarding. Musculoskeletal: Normal range of motion. Neurological: He is oriented to person, place, and time. Skin: Skin is warm. Capillary refill takes less than 2 seconds. No rash noted. Psychiatric: He has a normal mood and affect. His behavior is normal. Judgment and thought content normal.        MDM  Number of Diagnoses or Management Options  Acute on chronic congestive heart failure, unspecified heart failure type [facility]):   Critical Care  Total time providing critical care: 30-74 minutes     40 minutes    Procedures      ED EKG interpretation:  Rhythm: normal sinus rhythm; and regular . Rate (approx.): 75; Axis: left axis deviation; P wave: normal; QRS interval: prolonged; ST/T wave: non-specific changes;  LBBB pattern- unchanged. This EKG was interpreted by Angelique Frankel, MD,ED Provider. CXR c/w failure. BNP elevated. Troponin mildly elevated at 0.08. Lasix ordered. Will admit. Doing better on bipap. Hospitalist Billy for Admission  1:46 AM    ED Room Number: ER04/04  Patient Name and age:  Effie Lyle 80 y.o.  male  Working Diagnosis: No diagnosis found.   Readmission: no  Isolation Requirements:  no  Recommended Level of Care:  telemetry  Code Status:  Full Code  Department:[facility] ED - [phone]  Other:  80year old male with h/o atril fib, cardiomyopathy, CAD presents with acute onset of cp/SOB- EMS placed on bipap. Doing better now. EKG shows chronic LBBB without acute ischemic changes. Pain essentially resolved with nitro in route. He's received Lasix. CXR/BNP c/w failure. Troponin 0.08.   Taken of anticoagulants due to GI bleed in past.

## 2019-10-22 NOTE — PROGRESS NOTES
Bedside and Verbal shift change report given to Celestino Acosta RN (oncoming nurse) by Emily Epperson RN (offgoing nurse). Report included the following information SBAR, Kardex, MAR and Cardiac Rhythm NSR w/ LBBB. 1250: Patient without complaints of pain. Requesting something to eat. Heparin gtt at 9 units/kg/hr. Wife at bedside. 1400: Patient without complaints. 1420: Patient down to CT with this nurse.  1500: aPTT and Troponin down to lab for processing. 1700: Patient complains of headache. Patient stated \"haven't eaten since yesterday. \"    Bedside and Verbal shift change report given to Brina Harmon RN (oncoming nurse) by Celestino Acosta RN (offgoing nurse). Report included the following information SBAR, Kardex, MAR and Cardiac Rhythm NSR.

## 2019-10-22 NOTE — PROGRESS NOTES
[facility]: MED RECONCILIATION    Comments/Recommendations:   Interview with patient at the bedside. Pharmacist reviewed medication list from Alexus Crowley located in the media tab of the EMR. Of note this pack also contains a medication list and Vial of Life for [employer]. Nurse notified  Pharmacist called the patient's preferred pharmacy [facility] to review the entire list below. Patient counseled to avoid Tylenol PM as it increased his risk of fall and may cause confuson. Medications added:     · Memantine  · Nortriptyline  · Natural tears  · Tylenol PM    Medications removed:    · Durezol  · Memantine xr  · Trazodone    Medications adjusted:    · None    Allergies: Patient has no known allergies. Prior to Admission Medications:       Medication Documentation Review Audit       Reviewed by Jasper Snow PHARMD (Pharmacist) on 10/22/19 at 0944      Medication Sig Documenting Provider Last Dose Status Taking? artificial tears, dextran 70-hypromellose, (NATURAL BALANCE TEARS) 0.1-0.3 % ophthalmic solution Administer 1 Drop to both eyes as needed (dry eyes). Provider, Historical  Active Yes   aspirin 81 mg tablet Take 81 mg by mouth nightly. Provider, Historical 10/21/2019 1900 Active Yes   CALCIUM CARBONATE (CALCIUM 500 PO) Take 1 Tab by mouth daily. Provider, Historical 10/21/2019 Unknown time Active Yes   carvedilol (COREG) 25 mg tablet TAKE ONE TABLET BY MOUTH TWICE A DAY WITH MEALS Tucker Santiago MD 10/21/2019 1830 Active Yes   diphenhydrAMINE-acetaminophen (TYLENOL PM EXTRA STRENGTH)  mg tab Take 2 Tabs by mouth nightly as needed (sleep). Provider, Historical  Active Yes   famotidine (PEPCID) 20 mg tablet Take 20 mg by mouth two (2) times a day. Provider, Historical 10/21/2019 1900 Active Yes   levothyroxine (SYNTHROID) 75 mcg tablet Take 75 mcg by mouth Daily (before breakfast). Provider, Historical 10/21/2019 0730 Active Yes   loratadine (CLARITIN) 10 mg tablet Take 10 mg by mouth daily. Provider, Historical 10/21/2019 0800 Active Yes   losartan (COZAAR) 100 mg tablet Take 1 Tab by mouth daily. Dane Frankel MD 10/21/2019 0800 Active Yes   memantine (NAMENDA) 10 mg tablet Take 10 mg by mouth two (2) times a day. Provider, Historical 10/21/2019 Unknown time Active Yes   MULTIVITAMIN PO Take 1 Tab by mouth daily. Provider, Historical 10/21/2019 0800 Active Yes   nortriptyline (PAMELOR) 10 mg capsule Take 10 mg by mouth nightly. Provider, Historical 10/21/2019 Unknown time Active Yes   polyethylene glycol (MIRALAX) 17 gram packet Take 17 g by mouth daily. Provider, Historical 10/21/2019 0800 Active Yes   psyllium (METAMUCIL) packet Take 1 Packet by mouth daily. Provider, Historical Unknown Unknown time Active No   simvastatin (ZOCOR) 20 mg tablet Take 20 mg by mouth nightly.  Provider, Historical 10/21/2019 1900 Active Yes                    Thank you,      Andres Christie, PharmD, BCPS

## 2019-10-22 NOTE — PROGRESS NOTES
Bedside and Verbal shift change report given to [address] (oncoming nurse) by Macarena Velarde (offgoing nurse). Report included the following information SBAR, Kardex, Intake/Output, MAR and Recent Results. Primary Nurse Jacqulyn Hodgkins and Ibis Polanco RN performed a dual skin assessment on this patient No impairment noted  Anish score is see flow sheet. 0730- Assessment completed, see flow sheet for full assessment. Pt is alert and oriented x 4.  equal. Pupils equal and reactive to light. Heart rate regular, NSR on monitor. Denies any chest pain at this time, states increased chest pain with coughing. Lungs diminshed, on BiPAP, FiO2 40%. Complains of mild shortness of breath at this time. RR 14, SpO2 98%. Bowel sounds active, denies any abd pain or nausea. Patient voiding using urinal. Skin intact with blanchable pinkness at sacrum. Pt states 5/10 headache. Will update MDs.     0800- Dr. Davon Null present at bedside assessing patient. [address] [address] [facility] present at bedside. 0830- Dr. Geeta Storey present at bedside assessing patient. Pt to go down for CTA of chest today. 1486- Dr. Marie Charles present at bedside assessing patient. Updated on troponins. Pt to go down for cardiac cath today. Orders for 1 in nitro paste. 0930- Labs drawn and sent. 9119- Patient off BiPAP. Placed on 6 L NC. RR 20 SpO2 95%. Pt denies any shortness of breath at this time. Will continue to monitor closely. 1017- Heparin drip started at 9 units/kg/hr, dual verification with Randa FLORES.     1130- Bedside and Verbal shift change report given to Marcelina Bonner RN (oncoming nurse) by [address] (offgoing nurse). Report included the following information SBAR, Kardex, Intake/Output, MAR and Recent Results.

## 2019-10-22 NOTE — PROGRESS NOTES
TRANSFER - IN REPORT:    Verbal report received from Valentin tang RN(name) on [facility]  being received from ED(unit) for routine progression of care      Report consisted of patients Situation, Background, Assessment and   Recommendations(SBAR). Information from the following report(s) SBAR, Kardex, ED Summary and Recent Results was reviewed with the receiving nurse. Opportunity for questions and clarification was provided. Assessment completed upon patients arrival to unit and care assumed. 0102 - Full assessment complete. Currently on BiPap. Tolerating well. No complaints of chest pain at this time. Sacrum red but blanchable. No other skin issues noted. Bedside and Verbal shift change report given to SANDRA Quiñones (oncoming nurse) by Soheila Christianson RN (offgoing nurse). Report included the following information SBAR, Kardex, ED Summary, Recent Results and Cardiac Rhythm SR.      **No complaints of chest pain during shift**

## 2019-10-22 NOTE — NURSE NAVIGATOR
Chart reviewed by Heart Failure Nurse Navigator. Heart Failure database completed. EF:  40% with moderate concentric hypertrophy and LV global hypokinesis. ACEi/ARB/ARNi: Losartan 100 mg daily. BB: Carvedilol 25 mg BID. Aldosterone Antagonist: **    Obstructive Sleep Apnea Screening:   STOP-BANG score:   Referred to Sleep Medicine:     CRT Not currently indicated. Waqas Simeon NYHA Functional Class **. Heart Failure Teach Back in Patient Education. Heart Failure Avoiding Triggers on Discharge Instructions. Cardiologist: Dr. Sony Banuelos discharge follow up phone call to be made within 48-72 hours of discharge.

## 2019-10-22 NOTE — PROGRESS NOTES
1900: Bedside and Verbal shift change report given to Erin Lozano (oncoming nurse) by Shellye Lefort RN (offgoing nurse). Report included the following information SBAR, Kardex, ED Summary, Intake/Output, MAR, Recent Results and Cardiac Rhythm NSR/Sinus Ibeth David. Primary Nurse Michele Moreau, RN and Kenyon Guo RN performed a dual skin assessment on this patient No impairment noted  Anish score is 17    2000: Shift  Assessment completed-see flow sheet            Mental Status: Patient A&Ox 4, PHILLIPS, denies numbness/tingling/chest pain            Respiratory: Lung sounds coarse diminished, denies SOB, no increased work of breathing or use of accessory muscles, O2 97% on 6L NC            Cardiac: NSR noted on the monitor, regular rate and rhythm, denies chest pain, nitro paste on KATHARINE            GI/: Abdomen soft, non tender, bowel sounds active, voiding without difficulty            IV Drips: Heparin 11u/kg/hr      2245- TRANSFER - OUT REPORT:    Verbal report given to [facility] RN(name) on Yanet Hood  being transferred to [facility](unit) for routine progression of care       Report consisted of patients Situation, Background, Assessment and   Recommendations(SBAR). Information from the following report(s) SBAR, ED Summary, Procedure Summary, Intake/Output, MAR, Med Rec Status and Cardiac Rhythm NSR was reviewed with the receiving nurse.     Lines:   Peripheral IV 10/22/19 Left Forearm (Active)   Site Assessment Clean, dry, & intact 10/22/2019  1:00 PM   Phlebitis Assessment 0 10/22/2019  1:00 PM   Infiltration Assessment 0 10/22/2019  1:00 PM   Dressing Status Clean, dry, & intact 10/22/2019  1:00 PM   Dressing Type Transparent;Tape 10/22/2019  1:00 PM   Hub Color/Line Status Green;Flushed;Patent 10/22/2019  1:00 PM   Action Taken Open ports on tubing capped 10/22/2019  1:00 PM   Alcohol Cap Used Yes 10/22/2019  1:00 PM       Peripheral IV 10/22/19 Right Antecubital (Active)   Site Assessment Clean, dry, & intact 10/22/2019  1:00 PM   Phlebitis Assessment 0 10/22/2019  1:00 PM   Infiltration Assessment 0 10/22/2019  1:00 PM   Dressing Status Clean, dry, & intact 10/22/2019  1:00 PM   Dressing Type Transparent;Tape 10/22/2019  1:00 PM   Hub Color/Line Status Pink;Flushed; Infusing;Patent 10/22/2019  1:00 PM   Action Taken Open ports on tubing capped 10/22/2019  1:00 PM   Alcohol Cap Used Yes 10/22/2019  1:00 PM        Opportunity for questions and clarification was provided.       Patient transported with:   Monitor  O2 @ 6 liters  Tech

## 2019-10-23 ENCOUNTER — APPOINTMENT (OUTPATIENT)
Dept: GENERAL RADIOLOGY | Age: 84
DRG: 280 | End: 2019-10-23
Attending: INTERNAL MEDICINE
Payer: MEDICARE

## 2019-10-23 LAB
ACT BLD: 208 SECS (ref 79–138)
ACT BLD: 230 SECS (ref 79–138)
ANION GAP SERPL CALC-SCNC: 8 MMOL/L (ref 5–15)
APTT PPP: 57 SEC (ref 22.1–32)
APTT PPP: 60.1 SEC (ref 22.1–32)
BNP SERPL-MCNC: 2109 PG/ML
BUN SERPL-MCNC: 20 MG/DL (ref 6–20)
BUN/CREAT SERPL: 19 (ref 12–20)
CALCIUM SERPL-MCNC: 8.7 MG/DL (ref 8.5–10.1)
CHLORIDE SERPL-SCNC: 101 MMOL/L (ref 97–108)
CO2 SERPL-SCNC: 26 MMOL/L (ref 21–32)
CREAT SERPL-MCNC: 1.07 MG/DL (ref 0.7–1.3)
ERYTHROCYTE [DISTWIDTH] IN BLOOD BY AUTOMATED COUNT: 12.9 % (ref 11.5–14.5)
GLUCOSE SERPL-MCNC: 90 MG/DL (ref 65–100)
HCT VFR BLD AUTO: 42.8 % (ref 36.6–50.3)
HGB BLD-MCNC: 14.5 G/DL (ref 12.1–17)
MAGNESIUM SERPL-MCNC: 2 MG/DL (ref 1.6–2.4)
MCH RBC QN AUTO: 30.5 PG (ref 26–34)
MCHC RBC AUTO-ENTMCNC: 33.9 G/DL (ref 30–36.5)
MCV RBC AUTO: 89.9 FL (ref 80–99)
NRBC # BLD: 0 K/UL (ref 0–0.01)
NRBC BLD-RTO: 0 PER 100 WBC
PHOSPHATE SERPL-MCNC: 3.6 MG/DL (ref 2.6–4.7)
PLATELET # BLD AUTO: 186 K/UL (ref 150–400)
PLATELET # BLD AUTO: 195 K/UL (ref 150–400)
PMV BLD AUTO: 9.7 FL (ref 8.9–12.9)
POTASSIUM SERPL-SCNC: 3.4 MMOL/L (ref 3.5–5.1)
RBC # BLD AUTO: 4.76 M/UL (ref 4.1–5.7)
SODIUM SERPL-SCNC: 135 MMOL/L (ref 136–145)
THERAPEUTIC RANGE,PTTT: ABNORMAL SECS (ref 58–77)
THERAPEUTIC RANGE,PTTT: ABNORMAL SECS (ref 58–77)
WBC # BLD AUTO: 11.1 K/UL (ref 4.1–11.1)

## 2019-10-23 PROCEDURE — 93571 IV DOP VEL&/PRESS C FLO 1ST: CPT | Performed by: INTERNAL MEDICINE

## 2019-10-23 PROCEDURE — 77030004522 HC CATH ANGI DX EXPO BSC -A: Performed by: INTERNAL MEDICINE

## 2019-10-23 PROCEDURE — 77030040361 HC SLV COMPR DVT MDII -B

## 2019-10-23 PROCEDURE — 77010033678 HC OXYGEN DAILY

## 2019-10-23 PROCEDURE — C1769 GUIDE WIRE: HCPCS | Performed by: INTERNAL MEDICINE

## 2019-10-23 PROCEDURE — 85027 COMPLETE CBC AUTOMATED: CPT

## 2019-10-23 PROCEDURE — 74011250637 HC RX REV CODE- 250/637: Performed by: STUDENT IN AN ORGANIZED HEALTH CARE EDUCATION/TRAINING PROGRAM

## 2019-10-23 PROCEDURE — 74011636320 HC RX REV CODE- 636/320: Performed by: INTERNAL MEDICINE

## 2019-10-23 PROCEDURE — B2111ZZ FLUOROSCOPY OF MULTIPLE CORONARY ARTERIES USING LOW OSMOLAR CONTRAST: ICD-10-PCS | Performed by: INTERNAL MEDICINE

## 2019-10-23 PROCEDURE — 77030029065 HC DRSG HEMO QCLOT ZMED -B: Performed by: INTERNAL MEDICINE

## 2019-10-23 PROCEDURE — 65660000000 HC RM CCU STEPDOWN

## 2019-10-23 PROCEDURE — 84100 ASSAY OF PHOSPHORUS: CPT

## 2019-10-23 PROCEDURE — 85347 COAGULATION TIME ACTIVATED: CPT | Performed by: INTERNAL MEDICINE

## 2019-10-23 PROCEDURE — 77030008543 HC TBNG MON PRSS MRTM -A: Performed by: INTERNAL MEDICINE

## 2019-10-23 PROCEDURE — 85347 COAGULATION TIME ACTIVATED: CPT

## 2019-10-23 PROCEDURE — 36415 COLL VENOUS BLD VENIPUNCTURE: CPT

## 2019-10-23 PROCEDURE — 74011250636 HC RX REV CODE- 250/636: Performed by: INTERNAL MEDICINE

## 2019-10-23 PROCEDURE — 99153 MOD SED SAME PHYS/QHP EA: CPT | Performed by: INTERNAL MEDICINE

## 2019-10-23 PROCEDURE — 74011000250 HC RX REV CODE- 250: Performed by: INTERNAL MEDICINE

## 2019-10-23 PROCEDURE — 83735 ASSAY OF MAGNESIUM: CPT

## 2019-10-23 PROCEDURE — 4A033BC MEASUREMENT OF ARTERIAL PRESSURE, CORONARY, PERCUTANEOUS APPROACH: ICD-10-PCS | Performed by: INTERNAL MEDICINE

## 2019-10-23 PROCEDURE — C1753 CATH, INTRAVAS ULTRASOUND: HCPCS | Performed by: INTERNAL MEDICINE

## 2019-10-23 PROCEDURE — 83880 ASSAY OF NATRIURETIC PEPTIDE: CPT

## 2019-10-23 PROCEDURE — 80048 BASIC METABOLIC PNL TOTAL CA: CPT

## 2019-10-23 PROCEDURE — 92978 ENDOLUMINL IVUS OCT C 1ST: CPT | Performed by: INTERNAL MEDICINE

## 2019-10-23 PROCEDURE — 77030027138 HC INCENT SPIROMETER -A

## 2019-10-23 PROCEDURE — C1760 CLOSURE DEV, VASC: HCPCS | Performed by: INTERNAL MEDICINE

## 2019-10-23 PROCEDURE — 93458 L HRT ARTERY/VENTRICLE ANGIO: CPT | Performed by: INTERNAL MEDICINE

## 2019-10-23 PROCEDURE — 85730 THROMBOPLASTIN TIME PARTIAL: CPT

## 2019-10-23 PROCEDURE — 71045 X-RAY EXAM CHEST 1 VIEW: CPT

## 2019-10-23 PROCEDURE — C1887 CATHETER, GUIDING: HCPCS | Performed by: INTERNAL MEDICINE

## 2019-10-23 PROCEDURE — 74011250636 HC RX REV CODE- 250/636: Performed by: NURSE PRACTITIONER

## 2019-10-23 PROCEDURE — 99152 MOD SED SAME PHYS/QHP 5/>YRS: CPT | Performed by: INTERNAL MEDICINE

## 2019-10-23 PROCEDURE — 4A023N7 MEASUREMENT OF CARDIAC SAMPLING AND PRESSURE, LEFT HEART, PERCUTANEOUS APPROACH: ICD-10-PCS | Performed by: INTERNAL MEDICINE

## 2019-10-23 PROCEDURE — 85049 AUTOMATED PLATELET COUNT: CPT

## 2019-10-23 PROCEDURE — 77030004532 HC CATH ANGI DX IMP BSC -A: Performed by: INTERNAL MEDICINE

## 2019-10-23 PROCEDURE — 94760 N-INVAS EAR/PLS OXIMETRY 1: CPT

## 2019-10-23 RX ORDER — SODIUM CHLORIDE 0.9 % (FLUSH) 0.9 %
5-40 SYRINGE (ML) INJECTION AS NEEDED
Status: DISCONTINUED | OUTPATIENT
Start: 2019-10-23 | End: 2019-10-24 | Stop reason: HOSPADM

## 2019-10-23 RX ORDER — HEPARIN SODIUM 1000 [USP'U]/ML
INJECTION, SOLUTION INTRAVENOUS; SUBCUTANEOUS AS NEEDED
Status: DISCONTINUED | OUTPATIENT
Start: 2019-10-23 | End: 2019-10-23 | Stop reason: HOSPADM

## 2019-10-23 RX ORDER — MIDAZOLAM HYDROCHLORIDE 1 MG/ML
INJECTION, SOLUTION INTRAMUSCULAR; INTRAVENOUS AS NEEDED
Status: DISCONTINUED | OUTPATIENT
Start: 2019-10-23 | End: 2019-10-23 | Stop reason: HOSPADM

## 2019-10-23 RX ORDER — LIDOCAINE HYDROCHLORIDE 10 MG/ML
INJECTION INFILTRATION; PERINEURAL AS NEEDED
Status: DISCONTINUED | OUTPATIENT
Start: 2019-10-23 | End: 2019-10-23 | Stop reason: HOSPADM

## 2019-10-23 RX ORDER — SODIUM CHLORIDE 0.9 % (FLUSH) 0.9 %
5-40 SYRINGE (ML) INJECTION EVERY 8 HOURS
Status: DISCONTINUED | OUTPATIENT
Start: 2019-10-23 | End: 2019-10-24 | Stop reason: HOSPADM

## 2019-10-23 RX ORDER — POTASSIUM CHLORIDE 14.9 MG/ML
20 INJECTION INTRAVENOUS ONCE
Status: COMPLETED | OUTPATIENT
Start: 2019-10-23 | End: 2019-10-23

## 2019-10-23 RX ORDER — HEPARIN SODIUM 200 [USP'U]/100ML
INJECTION, SOLUTION INTRAVENOUS
Status: COMPLETED | OUTPATIENT
Start: 2019-10-23 | End: 2019-10-23

## 2019-10-23 RX ORDER — FENTANYL CITRATE 50 UG/ML
INJECTION, SOLUTION INTRAMUSCULAR; INTRAVENOUS AS NEEDED
Status: DISCONTINUED | OUTPATIENT
Start: 2019-10-23 | End: 2019-10-23 | Stop reason: HOSPADM

## 2019-10-23 RX ADMIN — FAMOTIDINE 20 MG: 20 TABLET ORAL at 08:02

## 2019-10-23 RX ADMIN — FAMOTIDINE 20 MG: 20 TABLET ORAL at 17:36

## 2019-10-23 RX ADMIN — LOSARTAN POTASSIUM 100 MG: 50 TABLET, FILM COATED ORAL at 08:02

## 2019-10-23 RX ADMIN — CARVEDILOL 25 MG: 12.5 TABLET, FILM COATED ORAL at 17:37

## 2019-10-23 RX ADMIN — ASPIRIN 81 MG: 81 TABLET, COATED ORAL at 20:17

## 2019-10-23 RX ADMIN — Medication 10 ML: at 14:00

## 2019-10-23 RX ADMIN — POTASSIUM CHLORIDE 20 MEQ: 200 INJECTION, SOLUTION INTRAVENOUS at 08:03

## 2019-10-23 RX ADMIN — Medication 10 ML: at 21:29

## 2019-10-23 RX ADMIN — CARVEDILOL 25 MG: 12.5 TABLET, FILM COATED ORAL at 08:02

## 2019-10-23 RX ADMIN — BUMETANIDE 1 MG: 0.25 INJECTION INTRAMUSCULAR; INTRAVENOUS at 20:17

## 2019-10-23 RX ADMIN — SIMVASTATIN 20 MG: 20 TABLET, FILM COATED ORAL at 21:29

## 2019-10-23 RX ADMIN — BUMETANIDE 1 MG: 0.25 INJECTION INTRAMUSCULAR; INTRAVENOUS at 08:03

## 2019-10-23 NOTE — PROGRESS NOTES
Chart Reviewed  Plan  1. Pt is from [address] 2. If home health or H2H  is recommended, will set that up for the patient. 3. CM will continue to follow.    SEA Silvestre

## 2019-10-23 NOTE — PROGRESS NOTES
[facility]     Name: Yanet Hood MRN: [id number]   : 1935 Hospital: [address]   Date: 10/23/2019        Impression Plan   1. Acute respiratory failure  2. Hypoxia  3. Chest pain  4. Mild pulm edema  5. Mild leukocytosis  6. Headache- from NTG               · Goal sats >90%, wean O2 as tolerated  · RVP negative  · ASA  · Diuresis  · Coreg/zocor  · Cardiology following, cath with non-obstructive CAD         Radiology  ( personally reviewed) CXR reviewed: mild pulmonary edema, if any  CTA without PE, no aneurysm/dissection, small bilateral effusions, mild edema   ABG No results for input(s): PHI, PO2I, PCO2I in the last 72 hours. Subjective     Cc: chest pain    79 yo with PMHX of CAD and cardiomyopathy presenting with chest pain for 2 days. Progressive in nature. No pleuritic chest pains. Denies fevers/chills/leg edema. Found to be 85% on RA on arrival. Currently on bipap support. Pt has not smoked since age 25. Has no hx of lung dz. Echo with ef of 50-55% with mild diffuse hypokinesis. Interval History:  Underwent cardiac cath today which revealed non-obstructive CAD. At the time of my evaluation he was sleeping, resting comfortably and satting 95% and greater on RA. He denies any shortness of breath currently. Review of Systems:  A comprehensive review of systems was negative except for that written in the HPI.     Past Medical History:   Diagnosis Date    Arthritis     Asbestos exposure     CAD (coronary artery disease)     non-obstructive CAD    Cancer ([facility]     prostate    Cardiomyopathy ([facility]     LVEF improved     Chronic anticoagulation 10/02/2016    Eliquis    Gout     High cholesterol     Hypertension     LBBB (left bundle branch block)     Memory deficit     PAF (paroxysmal atrial fibrillation) (HCC)     Seizures (HCC)     Vertigo       Past Surgical History:   Procedure Laterality Date    HX HERNIA REPAIR      HX MOHS PROCEDURES Right     HX PROSTATECTOMY      HX UROLOGICAL      penile implant      Prior to Admission medications    Medication Sig Start Date End Date Taking? Authorizing Provider   memantine (NAMENDA) 10 mg tablet Take 10 mg by mouth two (2) times a day. Yes Provider, Historical   nortriptyline (PAMELOR) 10 mg capsule Take 10 mg by mouth nightly. Yes Provider, Historical   artificial tears, dextran 70-hypromellose, (NATURAL BALANCE TEARS) 0.1-0.3 % ophthalmic solution Administer 1 Drop to both eyes as needed (dry eyes). Yes Provider, Historical   diphenhydrAMINE-acetaminophen (TYLENOL PM EXTRA STRENGTH)  mg tab Take 2 Tabs by mouth nightly as needed (sleep). Yes Provider, Historical   losartan (COZAAR) 100 mg tablet Take 1 Tab by mouth daily. 5/28/19  Yes Jackson Davis MD   famotidine (PEPCID) 20 mg tablet Take 20 mg by mouth two (2) times a day. Yes Provider, Historical   CALCIUM CARBONATE (CALCIUM 500 PO) Take 1 Tab by mouth daily. Yes Provider, Historical   levothyroxine (SYNTHROID) 75 mcg tablet Take 75 mcg by mouth Daily (before breakfast). Yes Provider, Historical   carvedilol (COREG) 25 mg tablet TAKE ONE TABLET BY MOUTH TWICE A DAY WITH MEALS 8/18/14  Yes Nickolas Santiago MD   loratadine (CLARITIN) 10 mg tablet Take 10 mg by mouth daily. Yes Provider, Historical   polyethylene glycol (MIRALAX) 17 gram packet Take 17 g by mouth daily. Yes Provider, Historical   simvastatin (ZOCOR) 20 mg tablet Take 20 mg by mouth nightly. 6/3/11  Yes Provider, Historical   aspirin 81 mg tablet Take 81 mg by mouth nightly. 6/3/11  Yes Provider, Historical   MULTIVITAMIN PO Take 1 Tab by mouth daily. 6/3/11  Yes Provider, Historical   psyllium (METAMUCIL) packet Take 1 Packet by mouth daily.     Provider, Historical     Current Facility-Administered Medications   Medication Dose Route Frequency    sodium chloride (NS) flush 5-40 mL  5-40 mL IntraVENous Q8H    sodium chloride (NS) flush 5-40 mL  5-40 mL IntraVENous Q8H    sodium chloride (NS) flush 5-40 mL  5-40 mL IntraVENous Q8H    bumetanide (BUMEX) injection 1 mg  1 mg IntraVENous Q12H    aspirin delayed-release tablet 81 mg  81 mg Oral QHS    carvedilol (COREG) tablet 25 mg  25 mg Oral BID WITH MEALS    famotidine (PEPCID) tablet 20 mg  20 mg Oral BID    losartan (COZAAR) tablet 100 mg  100 mg Oral DAILY    simvastatin (ZOCOR) tablet 20 mg  20 mg Oral QHS     No Known Allergies   Social History     Tobacco Use    Smoking status: Former Smoker    Smokeless tobacco: Never Used   Substance Use Topics    Alcohol use: No      Family History   Problem Relation Age of Onset    Cancer Father           Laboratory: I have personally reviewed the critical care flowsheet and labs.      Recent Labs     10/23/19  0711 10/23/19  0118 10/22/19  0936 10/22/19  0106   WBC  --  11.1 12.7* 11.5*   HGB  --  14.5 15.9 15.0   HCT  --  42.8 47.8 44.8    186 208 213     Recent Labs     10/23/19  0118 10/22/19  0106   * 135*   K 3.4* 4.4    104   CO2 26 21   GLU 90 167*   BUN 20 19   CREA 1.07 1.28   CA 8.7 8.3*   MG 2.0 2.1   PHOS 3.6  --    ALB  --  3.3*   SGOT  --  20   ALT  --  16       Objective:     Mode Rate Tidal Volume Pressure FiO2 PEEP            40 %       Vital Signs:     TMAX(24)      Intake/Output:   Last shift:      Ventilator Volumes  Vt Spont (ml): 445 ml  Ve Observed (l/min): 9.1 l/min  Last 3 shifts: 10/23 0701 - 10/23 1900  In: -   Out: 325 [Urine:325]RRIOLAST3    Intake/Output Summary (Last 24 hours) at 10/23/2019 1603  Last data filed at 10/23/2019 1226  Gross per 24 hour   Intake 685.07 ml   Output 1500 ml   Net -814.93 ml     EXAM:   GENERAL: well developed, on bipap HEENT:  PERRL, EOMI, no alar flaring or epistaxis, oral mucosa moist without cyanosis, NECK:  no jugular vein distention, no retractions, no thyromegaly or masses, LUNGS: CTA, no w/r/r , HEART:  Regular rate and rhythm with no MGR; no edema is present, ABDOMEN:  soft with no tenderness, bowel sounds present, EXTREMITIES:  warm with no cyanosis, SKIN:  no jaundice or ecchymosis and NEUROLOGIC:  alert and oriented, grossly non-focal    Jen Cardona MD  [facility] [address]

## 2019-10-23 NOTE — PROGRESS NOTES
[facility]  [address]  [phone]      Medical Progress Note      NAME: Ac Carmen   :  1935  MRM:  [id number]    Date/Time: 10/23/2019         Subjective:     Chief Complaint:  Patient was seen and examined by me. Chart reviewed. S/p cath. SOB improving. No chest pain       Objective:       Vitals:       Last 24hrs VS reviewed since prior progress note.  Most recent are:    Visit Vitals  /78 (BP 1 Location: Left arm, BP Patient Position: At rest)   Pulse 65   Temp 97.8 °F (36.6 °C)   Resp 18   Ht 5' 9\" (1.753 m)   Wt 103 kg (227 lb 1.2 oz)   SpO2 98%   BMI 33.53 kg/m²     SpO2 Readings from Last 6 Encounters:   10/23/19 98%   19 95%   18 97%   17 98%   17 98%   16 99%    O2 Flow Rate (L/min): 3 l/min       Intake/Output Summary (Last 24 hours) at 10/23/2019 1433  Last data filed at 10/23/2019 1226  Gross per 24 hour   Intake 685.07 ml   Output 1900 ml   Net -1214.93 ml        Exam:     Physical Exam:    Gen:  Well-developed, well-nourished, obese, in no acute distress  HEENT:  Pink conjunctivae, PERRL, hearing intact to voice, moist mucous membranes  Neck:  Supple, without masses, thyroid non-tender  Resp:  No accessory muscle use, clear breath sounds without wheezes rales or rhonchi  Card:  No murmurs, normal S1, S2 without thrills, bruits or peripheral edema  Abd:  Soft, non-tender, non-distended, normoactive bowel sounds are present  Musc:  No cyanosis or clubbing  Skin:  No rashes   Neuro:  Cranial nerves 3-12 are grossly intact, follows commands appropriately  Psych:  Good insight, oriented to person, place and time, alert    Medications Reviewed: (see below)    Lab Data Reviewed: (see below)    ______________________________________________________________________    Medications:     Current Facility-Administered Medications   Medication Dose Route Frequency    sodium chloride (NS) flush 5-40 mL  5-40 mL IntraVENous Q8H    sodium chloride (NS) flush 5-40 mL  5-40 mL IntraVENous PRN    sodium chloride (NS) flush 5-40 mL  5-40 mL IntraVENous Q8H    sodium chloride (NS) flush 5-40 mL  5-40 mL IntraVENous PRN    sodium chloride (NS) flush 5-40 mL  5-40 mL IntraVENous Q8H    sodium chloride (NS) flush 5-40 mL  5-40 mL IntraVENous PRN    bumetanide (BUMEX) injection 1 mg  1 mg IntraVENous Q12H    aspirin delayed-release tablet 81 mg  81 mg Oral QHS    carvedilol (COREG) tablet 25 mg  25 mg Oral BID WITH MEALS    famotidine (PEPCID) tablet 20 mg  20 mg Oral BID    losartan (COZAAR) tablet 100 mg  100 mg Oral DAILY    simvastatin (ZOCOR) tablet 20 mg  20 mg Oral QHS          Lab Review:     Recent Labs     10/23/19  0711 10/23/19  0118 10/22/19  0936 10/22/19  0106   WBC  --  11.1 12.7* 11.5*   HGB  --  14.5 15.9 15.0   HCT  --  42.8 47.8 44.8    186 208 213     Recent Labs     10/23/19  0118 10/22/19  0106   * 135*   K 3.4* 4.4    104   CO2 26 21   GLU 90 167*   BUN 20 19   CREA 1.07 1.28   CA 8.7 8.3*   MG 2.0 2.1   PHOS 3.6  --    ALB  --  3.3*   TBILI  --  0.4   SGOT  --  20   ALT  --  16     Lab Results   Component Value Date/Time    Glucose (POC) 100 05/05/2014 10:35 AM          Assessment / Plan: Active Problems:    79 yo hx of HTN, CAD, Pafib, LBBB, presented w/ chest pain, NSTEMI, resp failure, pulm edema, combined syst/diastolic CHF EF 72%    1) Chest pain/NSTEMI: Trop peaked at 0.68. Cath on 10/23 showed non-obs CAD. Cont ASA, coreg, statin    2) Acute resp failure/hypoxia/pulm edema: due to CHF. Now resolved. 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 Now much improved. Cont IV bumex, losartan, coreg    4) Pafib/LBBB: chronic. Not on Eliquis due to hx of GI bleed. Cont ASA    5) HTN: cont losartan, coreg    6) Hyperglycemia: no diabetes.   A1C was 5.8%    Total time spent with patient: 35 min                  Care Plan discussed with: Patient, friend, nursing    Discussed:  Care Plan    Prophylaxis:  SCD's    Disposition:  Home w/Family           ___________________________________________________    Attending Physician: Yamilet Medley MD

## 2019-10-23 NOTE — PROCEDURES
BRIEF PROCEDURE NOTE    Date of Procedure: 10/23/2019   Preoperative Diagnosis: CHF exac/ACS  Postoperative Diagnosis: Non obstructive CAD    Procedure: Left heart cath, LV angiography, coronary angiography  Interventional Cardiologist: Fred Kaufman MD  Assistant : none  Anesthesia: local + IV sedation  Estimated Blood Loss: Minimal  Findings:     R CFA access - ultrasound/fluoroscopic/micropuncture access    L Main: Large; Nml    LAD: Ca++; Prox - large; Mid/distal - small to med; Mid LAD at D2 - 60%; D1 - Ca++- prox 30%;  D2 - Med; MLI    LCflex: Prox - Med to large ; Mid - med 40%; Small OM1/OM2; Med OM3/OM4; Inf branch of OM4 - 70%- small vessel    RCA: Dominant; Large; Mid 40%; PDA and PLB - med - MLI    LVEDP: elevated 25    iFR - LAD - 0.91 not signifcant  IVUS LAD - significant ( >270 degrees ) calcium    No significant gradient across aortic valve. Specimens Removed : None    Complications: None    Closure Device: Perclose        See full cath note.     Complications: none    Fred Kaufman MD

## 2019-10-23 NOTE — PROGRESS NOTES
1030 TRANSFER - IN REPORT:    Verbal report received from Randa rn(name) on Sahra García  being received from cath lab(unit) for routine progression of care      Report consisted of patients Situation, Background, Assessment and   Recommendations(SBAR). Information from the following report(s) Procedure Summary was reviewed with the receiving nurse. Opportunity for questions and clarification was provided. Assessment completed upon patients arrival to unit and care assumed.  Right groin site soft dry in tact, perclose to site palpable pulses   1100 Dr. Tomas Hawthorne in to see, family at bedside   [phone] report called to Mirlande 4 rn questions answered prepare to transfer to room [id number] weaning 02 now at three liters  1130 assessed right groin site with oncoming nurse, placed on monitor and 02 3 liters significant other at bedside

## 2019-10-23 NOTE — PROGRESS NOTES
Bedside and Verbal shift change report given to Sheila (oncoming nurse) by Lindsey/Asheligh (offgoing nurse). Report included the following information SBAR, Kardex and Cardiac Rhythm NSR.       0450 patient off floor for cardiac cath, tele notified. 1100 TRANSFER - IN REPORT:    Verbal report received from [employer]) on Delroy Avalos  being received from cath recovery(unit) for routine post - op      Report consisted of patients Situation, Background, Assessment and   Recommendations(SBAR). Information from the following report(s) Cardiac Rhythm NSR was reviewed with the receiving nurse. Opportunity for questions and clarification was provided. Assessment completed upon patients arrival to unit and care assumed. Right groin site accessed, perclose used. Per Paola Higuera, patient is to be flat until 1130.     1130 site assessed with [facility], no bleeding or hematoma, dressing dry a d intact.

## 2019-10-23 NOTE — PROGRESS NOTES
12   Spoke with Dr. Ulysses Weaver about patient's petechiae looking pin point spots given that he is on a heparin drip and having cardiac cath at 0845, wanted clarification if the drip need to continue and if this was cause for concern. Patient getting CHG bath at this time, possible cause of the small red spots? Platelets from this am were stable. Order given to recheck platelets and stop heparin drip in lieu of the procedure. [id number]  Platelets WDL.

## 2019-10-23 NOTE — PROGRESS NOTES
Problem: Falls - Risk of  Goal: *Absence of Falls  Description  Document [facility] Fall Risk and appropriate interventions in the flowsheet. Outcome: Progressing Towards Goal  Note:   Fall Risk Interventions:  Mobility Interventions: Patient to call before getting OOB, PT Consult for mobility concerns, Utilize gait belt for transfers/ambulation         Medication Interventions: Patient to call before getting OOB, Teach patient to arise slowly    Elimination Interventions: Call light in reach, Patient to call for help with toileting needs, Toileting schedule/hourly rounds, Urinal in reach              Problem: Patient Education: Go to Patient Education Activity  Goal: Patient/Family Education  Outcome: Progressing Towards Goal     Problem: Pressure Injury - Risk of  Goal: *Prevention of pressure injury  Description  Document Anish Scale and appropriate interventions in the flowsheet. Outcome: Progressing Towards Goal  Note:   Pressure Injury Interventions: Activity Interventions: Assess need for specialty bed, Increase time out of bed, Pressure redistribution bed/mattress(bed type), PT/OT evaluation    Mobility Interventions: Chair cushion, Pressure redistribution bed/mattress (bed type), PT/OT evaluation, Turn and reposition approx.  every two hours(pillow and wedges)    Nutrition Interventions: Document food/fluid/supplement intake, Offer support with meals,snacks and hydration    Friction and Shear Interventions: Apply protective barrier, creams and emollients                Problem: Patient Education: Go to Patient Education Activity  Goal: Patient/Family Education  Outcome: Progressing Towards Goal     Problem: Unstable angina/NSTEMI: Day of Admission/Day 1  Goal: Activity/Safety  Outcome: Progressing Towards Goal  Goal: Consults, if ordered  Outcome: Progressing Towards Goal  Goal: Diagnostic Test/Procedures  Outcome: Progressing Towards Goal  Goal: Nutrition/Diet  Outcome: Progressing Towards Goal  Goal: Discharge Planning  Outcome: Progressing Towards Goal  Goal: Medications  Outcome: Progressing Towards Goal  Goal: Respiratory  Outcome: Progressing Towards Goal  Goal: Treatments/Interventions/Procedures  Outcome: Progressing Towards Goal  Goal: Psychosocial  Outcome: Progressing Towards Goal  Goal: *Hemodynamically stable  Outcome: Progressing Towards Goal  Goal: *Optimal pain control at patient's stated goal  Outcome: Progressing Towards Goal  Goal: *Lungs clear or at baseline  Outcome: Progressing Towards Goal

## 2019-10-23 NOTE — PROGRESS NOTES
2040: TRANSFER - IN REPORT:    Verbal report received from [facility] (name) on Paulina Navarrete  being received from ICU (unit) for routine progression of care      Report consisted of patients Situation, Background, Assessment and   Recommendations(SBAR). Information from the following report(s) SBAR, Kardex, ED Summary and Recent Results was reviewed with the receiving nurse. Opportunity for questions and clarification was provided. Assessment completed upon patients arrival to unit and care assumed. 2100: Patient transferred in, oriented to room, vitals taken, assessment completed, dual skin assessment completed with Ashleigh RN. No further needs expressed at this time. Patient resting comfortably. 0630: Petechiae noted over trunk, back, and legs. Pharmacy consulted, Pharmacist Eduin Mike discussed that platelets are stable and heparin will be stopping soon. Will pass on to day shift to continue to monitor. 0725: Bedside and Verbal shift change report given to Sheila (oncoming nurse) by Ashleigh/Lindsey Dodd nurse). Report included the following information SBAR, Kardex and Recent Results.

## 2019-10-24 VITALS
WEIGHT: 216.6 LBS | DIASTOLIC BLOOD PRESSURE: 71 MMHG | OXYGEN SATURATION: 95 % | HEART RATE: 67 BPM | SYSTOLIC BLOOD PRESSURE: 152 MMHG | BODY MASS INDEX: 32.08 KG/M2 | HEIGHT: 69 IN | RESPIRATION RATE: 18 BRPM | TEMPERATURE: 98.2 F

## 2019-10-24 LAB
ANION GAP SERPL CALC-SCNC: 7 MMOL/L (ref 5–15)
BUN SERPL-MCNC: 19 MG/DL (ref 6–20)
BUN/CREAT SERPL: 16 (ref 12–20)
CALCIUM SERPL-MCNC: 8.7 MG/DL (ref 8.5–10.1)
CHLORIDE SERPL-SCNC: 101 MMOL/L (ref 97–108)
CO2 SERPL-SCNC: 25 MMOL/L (ref 21–32)
CREAT SERPL-MCNC: 1.2 MG/DL (ref 0.7–1.3)
ERYTHROCYTE [DISTWIDTH] IN BLOOD BY AUTOMATED COUNT: 12.8 % (ref 11.5–14.5)
GLUCOSE SERPL-MCNC: 113 MG/DL (ref 65–100)
HCT VFR BLD AUTO: 42.9 % (ref 36.6–50.3)
HGB BLD-MCNC: 14.6 G/DL (ref 12.1–17)
MAGNESIUM SERPL-MCNC: 1.9 MG/DL (ref 1.6–2.4)
MCH RBC QN AUTO: 30.4 PG (ref 26–34)
MCHC RBC AUTO-ENTMCNC: 34 G/DL (ref 30–36.5)
MCV RBC AUTO: 89.4 FL (ref 80–99)
NRBC # BLD: 0 K/UL (ref 0–0.01)
NRBC BLD-RTO: 0 PER 100 WBC
PHOSPHATE SERPL-MCNC: 3.2 MG/DL (ref 2.6–4.7)
PLATELET # BLD AUTO: 189 K/UL (ref 150–400)
PMV BLD AUTO: 9.1 FL (ref 8.9–12.9)
POTASSIUM SERPL-SCNC: 3.5 MMOL/L (ref 3.5–5.1)
RBC # BLD AUTO: 4.8 M/UL (ref 4.1–5.7)
SODIUM SERPL-SCNC: 133 MMOL/L (ref 136–145)
WBC # BLD AUTO: 11.5 K/UL (ref 4.1–11.1)

## 2019-10-24 PROCEDURE — 74011000250 HC RX REV CODE- 250: Performed by: INTERNAL MEDICINE

## 2019-10-24 PROCEDURE — 85027 COMPLETE CBC AUTOMATED: CPT

## 2019-10-24 PROCEDURE — 36415 COLL VENOUS BLD VENIPUNCTURE: CPT

## 2019-10-24 PROCEDURE — 84100 ASSAY OF PHOSPHORUS: CPT

## 2019-10-24 PROCEDURE — 83735 ASSAY OF MAGNESIUM: CPT

## 2019-10-24 PROCEDURE — 80048 BASIC METABOLIC PNL TOTAL CA: CPT

## 2019-10-24 PROCEDURE — 77010033678 HC OXYGEN DAILY

## 2019-10-24 PROCEDURE — 74011250637 HC RX REV CODE- 250/637: Performed by: STUDENT IN AN ORGANIZED HEALTH CARE EDUCATION/TRAINING PROGRAM

## 2019-10-24 PROCEDURE — 74011250637 HC RX REV CODE- 250/637: Performed by: INTERNAL MEDICINE

## 2019-10-24 RX ORDER — FUROSEMIDE 20 MG/1
20 TABLET ORAL DAILY
Status: DISCONTINUED | OUTPATIENT
Start: 2019-10-24 | End: 2019-10-24 | Stop reason: HOSPADM

## 2019-10-24 RX ORDER — FUROSEMIDE 20 MG/1
20 TABLET ORAL DAILY
Qty: 30 TAB | Refills: 1 | Status: SHIPPED | OUTPATIENT
Start: 2019-10-24 | End: 2019-11-05 | Stop reason: SDUPTHER

## 2019-10-24 RX ORDER — POTASSIUM CHLORIDE 750 MG/1
40 TABLET, FILM COATED, EXTENDED RELEASE ORAL
Status: DISCONTINUED | OUTPATIENT
Start: 2019-10-24 | End: 2019-10-24 | Stop reason: HOSPADM

## 2019-10-24 RX ORDER — ACETAMINOPHEN 500 MG
500 TABLET ORAL
Status: DISCONTINUED | OUTPATIENT
Start: 2019-10-24 | End: 2019-10-24 | Stop reason: HOSPADM

## 2019-10-24 RX ADMIN — CARVEDILOL 25 MG: 12.5 TABLET, FILM COATED ORAL at 07:56

## 2019-10-24 RX ADMIN — FAMOTIDINE 20 MG: 20 TABLET ORAL at 08:04

## 2019-10-24 RX ADMIN — LOSARTAN POTASSIUM 100 MG: 50 TABLET, FILM COATED ORAL at 08:03

## 2019-10-24 RX ADMIN — ACETAMINOPHEN 500 MG: 500 TABLET ORAL at 03:27

## 2019-10-24 RX ADMIN — Medication 10 ML: at 05:54

## 2019-10-24 RX ADMIN — BUMETANIDE 1 MG: 0.25 INJECTION INTRAMUSCULAR; INTRAVENOUS at 07:57

## 2019-10-24 NOTE — NURSE NAVIGATOR
Met with patient, his son, and close female friend at bedside. Introduced self and role of HF NN. Assessed current understanding of his heart disease. Patient had decent understanding, his son, however, had a better understanding. Reviewed definition of heart failure, symptoms of HF, diuretic medication, lasix, rationale for daily weight, how to be accurate with daily weight and  parameters to notify the MD of. Discussed relationship of high sodium foods to fluid congestion and symptoms and hidden sources of dietary sodium. Patient lives in independent living in [facility]. He has access to meals but does not like their meals and relies on processed foods like frozen dinners and soups. He is open to making changes. His son and friend both were engaged in changes he could make to his diet as well. Discussed contribution of fluid intake to fluid congestion and gave general guidelines for fluid intake with what constitutes a fluid. Discussed importance of staying active within symptoms limitation, rationale for early f/u with Cardiology. Emphasis placed on early recognition of symptoms and notification of provider. HF magnet filled out with Dr. Eduard Hamman office number. Patient was given Living with HF Education Book, HF magnet, HF calendar, and my contact information for questions regarding HF education.

## 2019-10-24 NOTE — DISCHARGE SUMMARY
[facility]  [address] [facility], [address] [place] [address]  [phone]    Physician Discharge Summary     Patient ID:  Effie Lyle  [id number]  89 y.o.  1935    Admit date: 10/22/2019    Discharge date and time: 10/24/2019    Admission Diagnoses: CHF exacerbation ([address] [I50.9]  Chest pain [R07.9]    Discharge Diagnoses:  Principal Diagnosis NSTEMI (non-ST elevated myocardial infarction) [facility]                                            Principal Problem:    NSTEMI (non-ST elevated myocardial infarction) ([address] (10/22/2019)    Active Problems:    Acquired hypothyroidism (5/5/2014)      HTN (hypertension), benign (5/5/2014)      Esophageal reflux (5/5/2014)      CAD (coronary artery disease) (5/19/2014)      Cardiomyopathy ([address] (5/19/2014)      PAF (paroxysmal atrial fibrillation) (HCC) ()      LBBB (left bundle branch block) ()      High cholesterol ()      Obesity (4/26/2019)      CHF exacerbation ([address] (10/22/2019)      Elevated troponin (10/22/2019)      Chest pain (10/22/2019)      SOB (shortness of breath) (10/22/2019)      Acute respiratory failure with hypoxia ([address] (10/22/2019)           Hospital Course:     79 yo hx of HTN, CAD, Pafib, LBBB, presented w/ chest pain, NSTEMI, resp failure, pulm edema, combined syst/diastolic CHF EF 85%     1) Chest pain/NSTEMI: Trop peaked at 0.68. Cath on 10/23 showed non-obs CAD. Cont ASA, coreg, statin     2) Acute resp failure/hypoxia/pulm edema: due to CHF. Now resolved. no need for O2 on discharge. 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 Now much improved. Switch IV bumex to oral lasix. Cont losartan, coreg     4) Pafib/LBBB: chronic. Not on Eliquis due to hx of GI bleed. Cont ASA     5) HTN: cont losartan, coreg     6) Hyperglycemia: no diabetes.   A1C was 5.8%    PCP: Edwin Castellanos MD     Consults: Juanita pulm    Significant Diagnostic Studies: heart cath    Discharge Exam:  Physical Exam:    Gen: Well-developed, well-nourished, morbidly obese, in no acute distress  HEENT:  Pink conjunctivae, PERRL, hearing intact to voice, moist mucous membranes  Neck: Supple, without masses, thyroid non-tender  Resp: No accessory muscle use, clear breath sounds without wheezes rales or rhonchi  Card: No murmurs, normal S1, S2 without thrills, bruits or peripheral edema  Abd:  Soft, non-tender, non-distended, normoactive bowel sounds are present  Lymph:  No cervical or inguinal adenopathy  Musc: No cyanosis or clubbing  Skin: No rashes  Neuro:  Cranial nerves are grossly intact, no focal motor weakness, follows commands appropriately  Psych:  Good insight, oriented to person, place and time, alert    Disposition: home  Discharge Condition: Stable    Patient Instructions:   Current Discharge Medication List      START taking these medications    Details   furosemide (LASIX) 20 mg tablet Take 1 Tab by mouth daily. Qty: 30 Tab, Refills: 1         CONTINUE these medications which have NOT CHANGED    Details   memantine (NAMENDA) 10 mg tablet Take 10 mg by mouth two (2) times a day. nortriptyline (PAMELOR) 10 mg capsule Take 10 mg by mouth nightly. artificial tears, dextran 70-hypromellose, (NATURAL BALANCE TEARS) 0.1-0.3 % ophthalmic solution Administer 1 Drop to both eyes as needed (dry eyes). diphenhydrAMINE-acetaminophen (TYLENOL PM EXTRA STRENGTH)  mg tab Take 2 Tabs by mouth nightly as needed (sleep). losartan (COZAAR) 100 mg tablet Take 1 Tab by mouth daily. Qty: 90 Tab, Refills: 1      famotidine (PEPCID) 20 mg tablet Take 20 mg by mouth two (2) times a day. Associated Diagnoses: PAF (paroxysmal atrial fibrillation) ([facility])      CALCIUM CARBONATE (CALCIUM 500 PO) Take 1 Tab by mouth daily. levothyroxine (SYNTHROID) 75 mcg tablet Take 75 mcg by mouth Daily (before breakfast).     Associated Diagnoses: PAF (paroxysmal atrial fibrillation) ([facility])      carvedilol (COREG) 25 mg tablet TAKE ONE TABLET BY MOUTH TWICE A DAY WITH MEALS  Qty: 60 Tab, Refills: 1      loratadine (CLARITIN) 10 mg tablet Take 10 mg by mouth daily. polyethylene glycol (MIRALAX) 17 gram packet Take 17 g by mouth daily. simvastatin (ZOCOR) 20 mg tablet Take 20 mg by mouth nightly. aspirin 81 mg tablet Take 81 mg by mouth nightly. MULTIVITAMIN PO Take 1 Tab by mouth daily. psyllium (METAMUCIL) packet Take 1 Packet by mouth daily.            Activity: Activity as tolerated  Diet: Cardiac Diet  Wound Care: None needed    Follow-up with  Follow-up Information     Follow up With Specialties Details Why Contact Info    Shania Germain MD Cardiology On 11/18/2019 320 pm [address] [facility] [address] Bourne MD Family Practice Schedule an appointment as soon as possible for a visit in 1 week  [address] [facility] [address] [id number]  [phone]            Follow-up tests/labs none    Signed:  Marline Medina MD  10/24/2019  9:45 AM    I spent 32 min on discharge

## 2019-10-24 NOTE — DISCHARGE INSTRUCTIONS
HOSPITALIST DISCHARGE INSTRUCTIONS  NAME: Nevaeh Mendiola   :  1935   MRN:  [id number]     Date/Time:  10/24/2019 9:44 AM    ADMIT DATE: 10/22/2019     DISCHARGE DATE: 10/24/2019     ADMITTING DIAGNOSIS:  NSTEMI (heart attack), systolic CHF EF 75%, pulmonary edema    DISCHARGE DIAGNOSIS:  same    MEDICATIONS:  See after visit summary       · It is important that you take the medication exactly as they are prescribed. · Keep your medication in the bottles provided by the pharmacist and keep a list of the medication names, dosages, and times to be taken in your wallet. · Do not take other medications without consulting your doctor     Pain Management: per above medications    What to do at Home    Recommended diet:  Cardiac Diet    Recommended activity: Activity as tolerated    1) Return to the hospital if you feel worse    2) If you experience any of the following symptoms then please call your primary care physician or return to the emergency room if you cannot get hold of your doctor:  Fever, chills, nausea, vomiting, diarrhea, change in mentation, falling, bleeding, shortness of breath, chest pain, severe headache, severe abdominal pain,     3) follow up with your doctors    Follow Up: Follow-up Information     Follow up With Specialties Details Why Contact Info    Casa Back MD Cardiology On 2019 320 pm [facility] [address]  [address] [place] [address]  [phone]      Lion Robertson MD Family Practice Schedule an appointment as soon as possible for a visit in 1 week  [address] [place] [address]      Adriana Velásquez NP Nurse Practitioner On 10/29/2019 [id number] [place] [phone]              Information obtained by :  I understand that if any problems occur once I am at home I am to contact my physician.     I understand and acknowledge receipt of the instructions indicated above.                                                                                                                                           Physician's or R.N.'s Signature                                                                  Date/Time                                                                                                                                              Patient or Representative Signature                                                          Date/Time    Patient Discharge Instructions    Abril Burrows / [id number] : 1935    Admitted 10/22/2019 Discharged: 10/24/2019     Take Home Medications       · It is important that you take the medication exactly as they are prescribed. · Keep your medication in the bottles provided by the pharmacist and keep a list of the medication names, dosages, and times to be taken in your wallet. · Do not take other medications without consulting your doctor. BRING ALL OF YOUR MEDICINES TO YOUR OFFICE VISIT with Dr. Isaias Gong   Date Time Provider Ren Armstrong   2019  3:20 PM Kulwinder Davis MD [address] [place] [address]           Cardiac Catheterization  Discharge Instructions     Do not drive, operate any machinery, or sign any legal documents for 24 hours after your procedure. You must have someone to drive you home.  You may take a shower 24 hours after your cardiac catheterization. Be sure to get the dressing wet and then remove it; gently wash the area with warm soapy water. Pat dry and leave open to air. To help prevent infections, be sure to keep the cath site clean and dry. No lotions, creams, powders, ointments, etc. in the cath site for approximately 1 week.  Do not take a tub bath, get in a hot tub or swimming pool for approximately 5 days or until the cath site is completely healed.  No strenuous activity or heavy lifting over 10 lbs. for 7 days.      Drink plenty of fluids for 24-48 hours after your cath to flush the contrast dye from your kidneys. No alcoholic beverages for 24 hours. You may resume your previous diet (low fat, low cholesterol) after your cath.  After your cath, some bruising or discomfort is common during the healing process. Tylenol, 1-2 tablets every 6 hours as needed, is recommended if you experience any discomfort. If you experience any signs or symptoms of infection such as fever, chills, or poorly healing incision, persistent tenderness or swelling in the groin, redness and/or warmth to the touch, numbness, significant tingling or pain at the groin site or affected extremity, rash, drainage from the cath site, or if the leg feels tight or swollen, call your physician right away.  If bleeding at the cath site occurs, take a clean gauze pad and apply direct pressure to the groin just above the puncture site. Call 911 immediately, and continue to apply direct pressure until an ambulance gets to your location.  You may return to work  2  days after your cardiac cath if no groin bleeding. Information obtained by :  I understand that if any problems occur once I am at home I am to contact my physician. I understand and acknowledge receipt of the instructions indicated above. R.N.'s Signature                                                                  Date/Time                                                                                                                                              Patient or Representative Signature                                                          Date/Time      Mahendra Son NP     Avoiding Triggers With Heart Failure: Care Instructions  Your Care Instructions    Triggers are anything that make your heart failure flare up.  A flare-up is also called \"sudden heart failure\" or \"acute heart failure. \" When you have a flare-up, fluid builds up in your lungs, and you have problems breathing. You might need to go to the hospital. By watching for changes in your condition and avoiding triggers, you can prevent heart failure flare-ups. Follow-up care is a key part of your treatment and safety. Be sure to make and go to all appointments, and call your doctor if you are having problems. It's also a good idea to know your test results and keep a list of the medicines you take. How can you care for yourself at home? Watch for changes in your weight and condition  · Weigh yourself without clothing at the same time each day. Record your weight. Call your doctor if you have sudden weight gain, such as more than 2 to 3 pounds in a day or 5 pounds in a week. (Your doctor may suggest a different range of weight gain.) A sudden weight gain may mean that your heart failure is getting worse. · Keep a daily record of your symptoms. Write down any changes in how you feel, such as new shortness of breath, cough, or problems eating. Also record if your ankles are more swollen than usual and if you feel more tired than usual. Note anything that you ate or did that could have triggered these changes. Limit sodium  Sodium causes your body to hold on to extra water. This may cause your heart failure symptoms to get worse. People get most of their sodium from processed foods. Fast food and restaurant meals also tend to be very high in sodium. · Your doctor may suggest that you limit sodium to 2,000 milligrams (mg) a day or less. That is less than 1 teaspoon of salt a day, including all the salt you eat in cooking or in packaged foods. · Read food labels on cans and food packages. They tell you how much sodium you get in one serving. Check the serving size. If you eat more than one serving, you are getting more sodium.   · Be aware that sodium can come in forms other than salt, including monosodium glutamate (MSG), sodium citrate, and sodium bicarbonate (baking soda). MSG is often added to Asian food. You can sometimes ask for food without MSG or salt. · Slowly reducing salt will help you adjust to the taste. Take the salt shaker off the table. · Flavor your food with garlic, lemon juice, onion, vinegar, herbs, and spices instead of salt. Do not use soy sauce, steak sauce, onion salt, garlic salt, mustard, or ketchup on your food, unless it is labeled \"low-sodium\" or \"low-salt. \"  · Make your own salad dressings, sauces, and ketchup without adding salt. · Use fresh or frozen ingredients, instead of canned ones, whenever you can. Choose low-sodium canned goods. · Eat less processed food and food from restaurants, including fast food. Exercise as directed  Moderate, regular exercise is very good for your heart. It improves your blood flow and helps control your weight. But too much exercise can stress your heart and cause a heart failure flare-up. · Check with your doctor before you start an exercise program.  · Walking is an easy way to get exercise. Start out slowly. Gradually increase the length and pace of your walk. Swimming, riding a bike, and using a treadmill are also good forms of exercise. · When you exercise, watch for signs that your heart is working too hard. You are pushing yourself too hard if you cannot talk while you are exercising. If you become short of breath or dizzy or have chest pain, stop, sit down, and rest.  · Do not exercise when you do not feel well. Take medicines correctly  · Take your medicines exactly as prescribed. Call your doctor if you think you are having a problem with your medicine. · Make a list of all the medicines you take. Include those prescribed to you by other doctors and any over-the-counter medicines, vitamins, or supplements you take. Take this list with you when you go to any doctor. · Take your medicines at the same time every day. It may help you to post a list of all the medicines you take every day and what time of day you take them. · Make taking your medicine as simple as you can. Plan times to take your medicines when you are doing other things, such as eating a meal or getting ready for bed. This will make it easier to remember to take your medicines. · Get organized. Use helpful tools, such as daily or weekly pill containers. When should you call for help? Call 911 if you have symptoms of sudden heart failure such as:    · You have severe trouble breathing.     · You cough up pink, foamy mucus.     · You have a new irregular or rapid heartbeat.    Call your doctor now or seek immediate medical care if:    · You have new or increased shortness of breath.     · You are dizzy or lightheaded, or you feel like you may faint.     · You have sudden weight gain, such as more than 2 to 3 pounds in a day or 5 pounds in a week. (Your doctor may suggest a different range of weight gain.)     · You have increased swelling in your legs, ankles, or feet.     · You are suddenly so tired or weak that you cannot do your usual activities.    Watch closely for changes in your health, and be sure to contact your doctor if you develop new symptoms. Where can you learn more? Go to http://lizzette-adolph.info/. Enter L062 in the search box to learn more about \"Avoiding Triggers With Heart Failure: Care Instructions. \"  Current as of: April 9, 2019  Content Version: 12.2  © 1895-3538 [employer]. Care instructions adapted under license by [employer] (which disclaims liability or warranty for this information). If you have questions about a medical condition or this instruction, always ask your healthcare professional. [address] any warranty or liability for your use of this information.

## 2019-10-24 NOTE — PROGRESS NOTES
Cardiology Progress Note                             [address], [place], [address] [facility]                                 Phone [phone]; Fax [phone]        10/24/2019 10:38 AM     Admit Date:           10/22/2019  Admit Diagnosis:  CHF exacerbation ([facility] [address] [I50.9]  Chest pain [R07.9]  :          1935   MRN:          [id number]   ASSESSMENT/RECOMMENDATION:   Acute hypoxic respiratory failure/pulmonary edema: resolved     HFrEF: echo showing decline in LVEF ow 40%  -continue BB/ARB/daily lasix   -replete K  -reviewed daily weights in detail   -CHF follow up on AVS    Chest pain with WMA on echocardiogram/ NSTEMI: cardiac cath yesterday showing non obstructive CAD. Reviewed cath results and activity restr  -ASA/statin     Non-obstructive CAD  pAF- in SR, not on AC due to GI bleeding  Hx of GI bleeding    Reviewed above plan with the patient and family   Future Appointments   Date Time Provider Ren Armstrong   10/29/2019 10:00 AM Adriana Velásquez NP [address] [place] [address]   2019  3:20 PM Sarah Davis MD [address] [place] [address]             Pt discharged prior to being seen by me. Chart reviewed. Agree with advanced NP's note; A and P    F/u as OP      Radha Foote. MD, [facility]          No intake/output data recorded. Last 3 Recorded Weights in this Encounter    10/22/19 0055 10/22/19 0831 10/24/19 0256   Weight: 227 lb (103 kg) 227 lb 1.2 oz (103 kg) 216 lb 9.6 oz (98.2 kg)         10/22 1901 - 10/24 0700  In: 1295.1 [P.O.:1090; I.V.:205.1]  Out: 2940 [Urine:2940]    SUBJECTIVE               Nevaeh Mendiola denies palpitations, irregular heart beat, SOB, chest pain or LE edema.    No lightheadedness or dizziness          Current Facility-Administered Medications   Medication Dose Route Frequency    acetaminophen (TYLENOL) tablet 500 mg  500 mg Oral Q6H PRN    furosemide (LASIX) tablet 20 mg  20 mg Oral DAILY    sodium chloride (NS) flush 5-40 mL  5-40 mL IntraVENous Q8H    sodium chloride (NS) flush 5-40 mL  5-40 mL IntraVENous PRN    sodium chloride (NS) flush 5-40 mL  5-40 mL IntraVENous Q8H    sodium chloride (NS) flush 5-40 mL  5-40 mL IntraVENous PRN    sodium chloride (NS) flush 5-40 mL  5-40 mL IntraVENous Q8H    sodium chloride (NS) flush 5-40 mL  5-40 mL IntraVENous PRN    aspirin delayed-release tablet 81 mg  81 mg Oral QHS    carvedilol (COREG) tablet 25 mg  25 mg Oral BID WITH MEALS    famotidine (PEPCID) tablet 20 mg  20 mg Oral BID    losartan (COZAAR) tablet 100 mg  100 mg Oral DAILY    simvastatin (ZOCOR) tablet 20 mg  20 mg Oral QHS      OBJECTIVE               Intake/Output Summary (Last 24 hours) at 10/24/2019 1038  Last data filed at 10/24/2019 0600  Gross per 24 hour   Intake 1090 ml   Output 1765 ml   Net -675 ml       Review of Systems - History obtained from the patient AS PER  HPI    Telemetry NSR    PHYSICAL EXAM        Visit Vitals  BP (!) 173/93 (BP 1 Location: Left arm, BP Patient Position: At rest)   Pulse 65   Temp 98.5 °F (36.9 °C)   Resp 19   Ht 5' 9\" (1.753 m)   Wt 216 lb 9.6 oz (98.2 kg)   SpO2 96%   BMI 31.99 kg/m²       Gen: Well-developed, elderly, in no acute distress  alert and oriented x 3  HEENT:  Pink conjunctivae, Hearing grossly normal.No scleral icterus or conjunctival, moist mucous membranes  Neck: Supple,No JVD  Resp: No accessory muscle use, Clear breath sounds, No rales or rhonchi  Card: Regular Rate,Rythm, No murmurs, rubs or gallop. No thrills. GI:          soft, non-tender   MSK: No cyanosis or clubbing, good capillary refill  Skin: No rashes or ulcers, no bruising. Right side groin site covered w CDI bandage- no hematoma or drainage seen.    Neuro:  Cranial nerves are grossly intact, moving all four extremities, no focal deficit, follows commands appropriately  Psych:  Good insight, oriented to person, place and time, alert, Nml Affect  LE: No edema       DATA REVIEW            Laboratory and Imaging have been reviewed by me and are notable for  Recent Labs     10/22/19  1501 10/22/19  0635 10/22/19  0106   TROIQ 0.52* 0.68* 0.08*     Recent Labs     10/24/19  0211 10/23/19  0711 10/23/19  0118 10/22/19  0936 10/22/19  0106   *  --  135*  --  135*   K 3.5  --  3.4*  --  4.4   CO2 25  --  26  --  21   BUN 19  --  20  --  19   CREA 1.20  --  1.07  --  1.28   *  --  90  --  167*   PHOS 3.2  --  3.6  --   --    MG 1.9  --  2.0  --  2.1   WBC 11.5*  --  11.1 12.7* 11.5*   HGB 14.6  --  14.5 15.9 15.0   HCT 42.9  --  42.8 47.8 44.8    [phone] NICK Welsh

## 2019-10-24 NOTE — PROGRESS NOTES
Problem: Falls - Risk of  Goal: *Absence of Falls  Description  Document Rosario Saldana Fall Risk and appropriate interventions in the flowsheet. Outcome: Progressing Towards Goal  Note:   Fall Risk Interventions:  Mobility Interventions: Assess mobility with egress test, Bed/chair exit alarm, Communicate number of staff needed for ambulation/transfer, OT consult for ADLs, Patient to call before getting OOB, PT Consult for mobility concerns, PT Consult for assist device competence         Medication Interventions: Assess postural VS orthostatic hypotension, Bed/chair exit alarm, Evaluate medications/consider consulting pharmacy, Patient to call before getting OOB, Teach patient to arise slowly    Elimination Interventions: Bed/chair exit alarm, Call light in reach, Elevated toilet seat, Patient to call for help with toileting needs, Stay With Me (per policy), Toilet paper/wipes in reach, Toileting schedule/hourly rounds, Urinal in reach              Problem: Patient Education: Go to Patient Education Activity  Goal: Patient/Family Education  Outcome: Progressing Towards Goal     Problem: Pressure Injury - Risk of  Goal: *Prevention of pressure injury  Description  Document Anish Scale and appropriate interventions in the flowsheet. Outcome: Progressing Towards Goal  Note:   Pressure Injury Interventions:  Sensory Interventions: Assess changes in LOC, Avoid rigorous massage over bony prominences, Chair cushion, Check visual cues for pain, Discuss PT/OT consult with provider, Keep linens dry and wrinkle-free, Minimize linen layers, Monitor skin under medical devices, Pad between skin to skin, Turn and reposition approx. every two hours (pillows and wedges if needed)         Activity Interventions: Chair cushion, Increase time out of bed, Pressure redistribution bed/mattress(bed type), PT/OT evaluation    Mobility Interventions: Chair cushion, PT/OT evaluation, Turn and reposition approx.  every two hours(pillow and wedges)    Nutrition Interventions: Document food/fluid/supplement intake, Discuss nutritional consult with provider, Offer support with meals,snacks and hydration    Friction and Shear Interventions: Apply protective barrier, creams and emollients, Foam dressings/transparent film/skin sealants, Lift team/patient mobility team, Minimize layers                Problem: Patient Education: Go to Patient Education Activity  Goal: Patient/Family Education  Outcome: Progressing Towards Goal     Problem: Unstable angina/NSTEMI: Day 2  Goal: Off Pathway (Use only if patient is Off Pathway)  Outcome: Progressing Towards Goal  Goal: Activity/Safety  Outcome: Progressing Towards Goal  Goal: Consults, if ordered  Outcome: Progressing Towards Goal  Goal: Diagnostic Test/Procedures  Outcome: Progressing Towards Goal  Goal: Nutrition/Diet  Outcome: Progressing Towards Goal  Goal: Discharge Planning  Outcome: Progressing Towards Goal  Goal: Medications  Outcome: Progressing Towards Goal  Goal: Respiratory  Outcome: Progressing Towards Goal  Goal: Treatments/Interventions/Procedures  Outcome: Progressing Towards Goal  Goal: Psychosocial  Outcome: Progressing Towards Goal  Goal: *Hemodynamically stable  Outcome: Progressing Towards Goal  Goal: *Optimal pain control at patient's stated goal  Outcome: Progressing Towards Goal  Goal: *Lungs clear or at baseline  Outcome: Progressing Towards Goal     Problem: Patient Education: Go to Patient Education Activity  Goal: Patient/Family Education  Outcome: Progressing Towards Goal     Problem: Heart Failure: Day 2  Goal: Off Pathway (Use only if patient is Off Pathway)  Outcome: Progressing Towards Goal  Goal: Activity/Safety  Outcome: Progressing Towards Goal  Goal: Consults, if ordered  Outcome: Progressing Towards Goal  Goal: Diagnostic Test/Procedures  Outcome: Progressing Towards Goal  Goal: Nutrition/Diet  Outcome: Progressing Towards Goal  Goal: Discharge Planning  Outcome: Progressing Towards Goal  Goal: Medications  Outcome: Progressing Towards Goal  Goal: Respiratory  Outcome: Progressing Towards Goal  Goal: Treatments/Interventions/Procedures  Outcome: Progressing Towards Goal  Goal: Psychosocial  Outcome: Progressing Towards Goal  Goal: *Oxygen saturation within defined limits  Outcome: Progressing Towards Goal  Goal: *Hemodynamically stable  Outcome: Progressing Towards Goal  Goal: *Optimal pain control at patient's stated goal  Outcome: Progressing Towards Goal  Goal: *Anxiety reduced or absent  Outcome: Progressing Towards Goal  Goal: *Demonstrates progressive activity  Outcome: Progressing Towards Goal

## 2019-10-24 NOTE — ROUTINE PROCESS
Bedside and Verbal shift change report given to Dat Santos RN (oncoming nurse) by Gopal Bautista RN (offgoing nurse). Report included the following information SBAR, Kardex, Procedure Summary, Intake/Output, MAR, Accordion, Recent Results, Cardiac Rhythm SR/BBB and Alarm Parameters .

## 2019-10-24 NOTE — PROGRESS NOTES
Pharmacist Discharge Medication Reconciliation    Discharge Provider:  Dr. Riddle November       Discharge Medications:      My Medications        START taking these medications        Instructions Each Dose to Equal Morning Noon Evening Bedtime   furosemide 20 mg tablet  Commonly known as:  LASIX  Your last dose was:  10/24 AM      Take 1 Tab by mouth daily. 20 mg                CONTINUE taking these medications        Instructions Each Dose to Equal Morning Noon Evening Bedtime   aspirin 81 mg tablet  Your last dose was:  10/23 PM  Your next dose is:  10/24 PM      Take 81 mg by mouth nightly. 81 mg         CALCIUM 500 PO  Your last dose was:  Not given at [facility]  Your next dose is:  Resume home regimen      Take 1 Tab by mouth daily. 1 Tab         carvedilol 25 mg tablet  Commonly known as:  COREG  Your last dose was:  10/24 AM  Your next dose is:  10/25 AM      TAKE ONE TABLET BY MOUTH TWICE A DAY WITH MEALS          CLARITIN 10 mg tablet  Generic drug:  loratadine  Your last dose was:  Not given at [facility]  Your next dose is:  Resume home regimen      Take 10 mg by mouth daily. 10 mg         levothyroxine 75 mcg tablet  Commonly known as:  SYNTHROID  Your last dose was:  Not given at [facility]  Your next dose is:  Resume home regimen      Take 75 mcg by mouth Daily (before breakfast). 75 mcg         losartan 100 mg tablet  Commonly known as:  COZAAR  Your last dose was:  10/24 AM  Your next dose is:  10/25 AM      Take 1 Tab by mouth daily. 100 mg         MIRALAX 17 gram packet  Generic drug:  polyethylene glycol  Your last dose was:  Not given at [facility]  Your next dose is:  Resume home regimen      Take 17 g by mouth daily. 17 g         MULTIVITAMIN PO  Your last dose was:  Not given at [facility]  Your next dose is:  Resume home regimen      Take 1 Tab by mouth daily. 1 Tab         NAMENDA 10 mg tablet  Generic drug:  memantine  Your last dose was:  Not given at [facility]  Your next dose is:  Resume home regimen.        Take 10 mg by mouth two (2) times a day. 10 mg         NATURAL BALANCE TEARS 0.1-0.3 % ophthalmic solution  Generic drug:  artificial tears (dextran 70-hypromellose)  Your last dose was:  Not given at [facility]  Your next dose is:  Resume home regimen      Administer 1 Drop to both eyes as needed (dry eyes). 1 Drop         nortriptyline 10 mg capsule  Commonly known as:  PAMELOR  Your last dose was:  Not given at [place] [address] next dose is:  Resume home regimen      Take 10 mg by mouth nightly. 10 mg         PEPCID 20 mg tablet  Generic drug:  famotidine  Your last dose was:  10/24 AM  Your next dose is:  10/25 AM      Take 20 mg by mouth two (2) times a day. 20 mg         psyllium packet  Commonly known as:  METAMUCIL  Your last dose was:  Not given at [place] [address] next dose is:  Resume home regimen      Take 1 Packet by mouth daily. 1 Packet         TYLENOL PM EXTRA STRENGTH  mg Tab  Generic drug:  diphenhydrAMINE-acetaminophen  Your last dose was:  Not given at [facility]  Your next dose is:  Resume home regimen      Take 2 Tabs by mouth nightly as needed (sleep). 2 Tab         ZOCOR 20 mg tablet  Generic drug:  simvastatin  Your last dose was:  10/23 PM  Your next dose is:  10/24 PM      Take 20 mg by mouth nightly. 20 mg                   Where to Get Your Medications        Information on where to get these meds will be given to you by the nurse or doctor.     Ask your nurse or doctor about these medications  furosemide 20 mg tablet        The patient's chart, MAR, and AVS were reviewed by   GAYLE Hobbs,   Contact: [phone]

## 2019-10-24 NOTE — CARDIO/PULMONARY
Cardiac Rehab: 81 yo male admitted with SOB & Chest Pain (10/22). Per Dr Leanne Claire, dx includes: NSTEMI and CHF exacerbation. S/P cardiac cath, with nonobstructive CAD (10/23). LVEF 40% by echo (10/22/19). Cardiologist is Dr Antoni Cardenas. 10/24/2019 Met briefly with Laquita Torres. Pt aware he did not need any stents. Stated, Martin Anderson don't think it was much of a heart attack. \" Provided MI education folder. Discussed benefits of outpatient cardiac rehab. Pt reported he resides at [facility] and they have exercise equipment. He declined referral for cardiac rehab at this time. Plans to f/u with Dr Antoni Cardenas on 11/18/19. Pt verbalized basic understanding of info provided. All questions were answered.

## 2019-10-24 NOTE — PROGRESS NOTES
[facility]     Name: Janee Taylor MRN: [id number]   : 1935 Hospital: [address] [place] [address] Dr   Date: 10/24/2019        Impression Plan   1. Acute respiratory failure  2. Hypoxia  3. Chest pain  4. Mild pulm edema  5. Mild leukocytosis  6. Headache- from NTG               · Goal sats >90%, wean O2 as tolerated  · ASA  · Diuresis per Cardiology  · Coreg/zocor  · Cardiology following, cath with non-obstructive CAD     is stable from a pulmonary standpoint. We will sign off and be available as needed for questions or concerns. Thank you for allowing us to participate in Mr. Whalen's care. Radiology  ( personally reviewed) CXR reviewed: mild pulmonary edema, if any  CTA without PE, no aneurysm/dissection, small bilateral effusions, mild edema   ABG No results for input(s): PHI, PO2I, PCO2I in the last 72 hours. Subjective     Cc: chest pain    79 yo with PMHX of CAD and cardiomyopathy presenting with chest pain for 2 days. Progressive in nature. No pleuritic chest pains. Denies fevers/chills/leg edema. Found to be 85% on RA on arrival. Currently on bipap support. Pt has not smoked since age 25. Has no hx of lung dz. Echo with ef of 50-55% with mild diffuse hypokinesis. Interval History:  Underwent cardiac cath yesterday which revealed non-obstructive CAD. Afebrile overnight  BP stable  O2 sats 96% on 2L NC  WBC 11.5  Na 133  Pro-BNP yesterday 9    ROS    Feels much better each day. Less SOB than yesterday. Denies cough or sputum production. Review of Systems:  A comprehensive review of systems was negative except for that written in the HPI.     Past Medical History:   Diagnosis Date    Arthritis     Asbestos exposure     CAD (coronary artery disease)     non-obstructive CAD    Cancer ([address]     prostate    Cardiomyopathy ([facility]     LVEF improved     Chronic anticoagulation 10/02/2016    Eliquis    Gout     High cholesterol     Hypertension     LBBB (left bundle branch block)     Memory deficit     PAF (paroxysmal atrial fibrillation) (HCC)     Seizures (HCC)     Vertigo       Past Surgical History:   Procedure Laterality Date    HX HERNIA REPAIR      HX MOHS PROCEDURES Right     HX PROSTATECTOMY      HX UROLOGICAL      penile implant      Prior to Admission medications    Medication Sig Start Date End Date Taking? Authorizing Provider   memantine (NAMENDA) 10 mg tablet Take 10 mg by mouth two (2) times a day. Yes Provider, Historical   nortriptyline (PAMELOR) 10 mg capsule Take 10 mg by mouth nightly. Yes Provider, Historical   artificial tears, dextran 70-hypromellose, (NATURAL BALANCE TEARS) 0.1-0.3 % ophthalmic solution Administer 1 Drop to both eyes as needed (dry eyes). Yes Provider, Historical   diphenhydrAMINE-acetaminophen (TYLENOL PM EXTRA STRENGTH)  mg tab Take 2 Tabs by mouth nightly as needed (sleep). Yes Provider, Historical   losartan (COZAAR) 100 mg tablet Take 1 Tab by mouth daily. 5/28/19  Yes Jackson Davis MD   famotidine (PEPCID) 20 mg tablet Take 20 mg by mouth two (2) times a day. Yes Provider, Historical   CALCIUM CARBONATE (CALCIUM 500 PO) Take 1 Tab by mouth daily. Yes Provider, Historical   levothyroxine (SYNTHROID) 75 mcg tablet Take 75 mcg by mouth Daily (before breakfast). Yes Provider, Historical   carvedilol (COREG) 25 mg tablet TAKE ONE TABLET BY MOUTH TWICE A DAY WITH MEALS 8/18/14  Yes Nickolas Santiago MD   loratadine (CLARITIN) 10 mg tablet Take 10 mg by mouth daily. Yes Provider, Historical   polyethylene glycol (MIRALAX) 17 gram packet Take 17 g by mouth daily. Yes Provider, Historical   simvastatin (ZOCOR) 20 mg tablet Take 20 mg by mouth nightly. 6/3/11  Yes Provider, Historical   aspirin 81 mg tablet Take 81 mg by mouth nightly. 6/3/11  Yes Provider, Historical   MULTIVITAMIN PO Take 1 Tab by mouth daily.  6/3/11  Yes Provider, Historical   psyllium (METAMUCIL) packet Take 1 Packet by mouth daily. Provider, Historical     Current Facility-Administered Medications   Medication Dose Route Frequency    furosemide (LASIX) tablet 20 mg  20 mg Oral DAILY    sodium chloride (NS) flush 5-40 mL  5-40 mL IntraVENous Q8H    sodium chloride (NS) flush 5-40 mL  5-40 mL IntraVENous Q8H    sodium chloride (NS) flush 5-40 mL  5-40 mL IntraVENous Q8H    aspirin delayed-release tablet 81 mg  81 mg Oral QHS    carvedilol (COREG) tablet 25 mg  25 mg Oral BID WITH MEALS    famotidine (PEPCID) tablet 20 mg  20 mg Oral BID    losartan (COZAAR) tablet 100 mg  100 mg Oral DAILY    simvastatin (ZOCOR) tablet 20 mg  20 mg Oral QHS     No Known Allergies   Social History     Tobacco Use    Smoking status: Former Smoker    Smokeless tobacco: Never Used   Substance Use Topics    Alcohol use: No      Family History   Problem Relation Age of Onset    Cancer Father           Laboratory: I have personally reviewed the critical care flowsheet and labs. Recent Labs     10/24/19  0211 10/23/19  0711 10/23/19  0118 10/22/19  0936   WBC 11.5*  --  11.1 12.7*   HGB 14.6  --  14.5 15.9   HCT 42.9  --  42.8 47.8    195 186 208     Recent Labs     10/24/19  0211 10/23/19  0118 10/22/19  0106   * 135* 135*   K 3.5 3.4* 4.4    101 104   CO2 25 26 21   * 90 167*   BUN 19 20 19   CREA 1.20 1.07 1.28   CA 8.7 8.7 8.3*   MG 1.9 2.0 2.1   PHOS 3.2 3.6  --    ALB  --   --  3.3*   SGOT  --   --  20   ALT  --   --  16       Objective:     Mode Rate Tidal Volume Pressure FiO2 PEEP            40 %       Vital Signs:     TMAX(24)      Intake/Output:   Last shift:      Ventilator Volumes  Vt Spont (ml): 445 ml  Ve Observed (l/min): 9.1 l/min  Last 3 shifts: No intake/output data recorded. [id number]    Intake/Output Summary (Last 24 hours) at 10/24/2019 0833  Last data filed at 10/24/2019 0600  Gross per 24 hour   Intake 1090 ml   Output 1765 ml   Net -675 ml     EXAM:   GENERAL: well developed, on bipap HEENT:  PERRL, EOMI, no alar flaring or epistaxis, oral mucosa moist without cyanosis, NECK:  no jugular vein distention, no retractions, no thyromegaly or masses, LUNGS: CTA, no w/r/r , HEART:  Regular rate and rhythm with no MGR; no edema is present, ABDOMEN:  soft with no tenderness, bowel sounds present, EXTREMITIES:  warm with no cyanosis, SKIN:  no jaundice or ecchymosis and NEUROLOGIC:  alert and oriented, grossly non-focal    Valentino Northern, NP  [facility]

## 2019-10-24 NOTE — PROGRESS NOTES
Bedside and Verbal shift change report given to Sheila (oncoming nurse) by Francis Garcia (offgoing nurse). Report included the following information SBAR, Kardex and Cardiac Rhythm NSR.     1000 discharge orders seen for patient. 1050 patient discharged. All questions answered and discharge instructions provided. IV removed and tele box returned. 1 prescription for furosemide 20mg tablet given to patient.

## 2019-10-24 NOTE — ROUTINE PROCESS
Bedside and Verbal shift change report given to SANDRA Sosa (oncoming nurse) by Vaishali Cooper RN (offgoing nurse). Report included the following information SBAR, Kardex, Procedure Summary, Intake/Output, MAR, Accordion, Recent Results, Cardiac Rhythm SR/BBB and Alarm Parameters .

## 2019-10-25 ENCOUNTER — PATIENT OUTREACH (OUTPATIENT)
Dept: FAMILY MEDICINE CLINIC | Age: 84
End: 2019-10-25

## 2019-10-25 NOTE — PROGRESS NOTES
Hospital Discharge Follow-Up      Date/Time:  10/25/2019 4:31 PM    Patient was admitted to [facility] on 10/22/19 and discharged on 10/24/19 for NSTEMI. The physician discharge summary was available at the time of outreach. Patient was contacted within 1 business days of discharge. Top Challenges reviewed with the provider   Documentation on file is not a Medical Advance Directive  Advance Care Planning:   Does patient have an Advance Directive:  not on file; education provided        Method of communication with provider :none    Inpatient RRAT score: 21  Was this a readmission? no   Patient stated reason for the readmission: N/A    Care Transition Nurse (CTN) contacted the patient by telephone to perform post hospital discharge assessment. Verified name and  with patient as identifiers. Provided introduction to self, and explanation of the CTN role. Patient received hospital discharge instructions. CTN reviewed discharge instructions and red flags with patient who verbalized understanding. Patient given an opportunity to ask questions and does not have any further questions or concerns at this time. The patient agrees to contact the PCP office for questions related to their healthcare. CTN provided contact information for future reference.     Disease Specific:   N/A    Patients top risk factors for readmission:  medical condition    Home Health orders at discharge: [address]: N/A  Date of initial visit: [facility] ordered at discharge: none  [address] received: N/A    Medication(s):   New Medications at Discharge: Lasix  Changed Medications at Discharge: none  Discontinued Medications at Discharge: none    Medication reconciliation Not performed, pt states he obtained and started lasix, that his other medication are the same on his discharge papers,  would not do a formal med rec with this CTN.  There were no barriers to obtaining medications identified at this time. Referral to Pharm D needed: no     Current Outpatient Medications   Medication Sig    furosemide (LASIX) 20 mg tablet Take 1 Tab by mouth daily.  memantine (NAMENDA) 10 mg tablet Take 10 mg by mouth two (2) times a day.  nortriptyline (PAMELOR) 10 mg capsule Take 10 mg by mouth nightly.  artificial tears, dextran 70-hypromellose, (NATURAL BALANCE TEARS) 0.1-0.3 % ophthalmic solution Administer 1 Drop to both eyes as needed (dry eyes).  diphenhydrAMINE-acetaminophen (TYLENOL PM EXTRA STRENGTH)  mg tab Take 2 Tabs by mouth nightly as needed (sleep).  losartan (COZAAR) 100 mg tablet Take 1 Tab by mouth daily.  famotidine (PEPCID) 20 mg tablet Take 20 mg by mouth two (2) times a day.  CALCIUM CARBONATE (CALCIUM 500 PO) Take 1 Tab by mouth daily.  levothyroxine (SYNTHROID) 75 mcg tablet Take 75 mcg by mouth Daily (before breakfast).  carvedilol (COREG) 25 mg tablet TAKE ONE TABLET BY MOUTH TWICE A DAY WITH MEALS    loratadine (CLARITIN) 10 mg tablet Take 10 mg by mouth daily.  polyethylene glycol (MIRALAX) 17 gram packet Take 17 g by mouth daily.  psyllium (METAMUCIL) packet Take 1 Packet by mouth daily.  simvastatin (ZOCOR) 20 mg tablet Take 20 mg by mouth nightly.  aspirin 81 mg tablet Take 81 mg by mouth nightly.  MULTIVITAMIN PO Take 1 Tab by mouth daily. No current facility-administered medications for this visit. There are no discontinued medications.     [facility] follow up appointment(s):   Future Appointments   Date Time Provider Ren Armstrong   10/29/2019 10:00 AM Niharika Cameron NP [facility]   11/18/2019  3:20 PM Prabhakar, Quinton Meigs, MD [address] [place] [address]      Non-[facility] follow up appointment(s): pt will make a f/u appointment with his PCP  [facility]:  offered and patient declined   Pt states he was not at a place to take contact information at the moment, declined to take this CTN contact information. Goals      Understands red flags post discharge. 10/25/19- pt did received discharge summary and is aware of red flags after discharge. Pt has f/u appointments with Cardiology he is aware of appointment on 10/29 with the NP and aware of appointment on 11/11 and will attend appointments as scheduled. Will contact cardiology if any red flags arise or 911 with a medical emergency.   CW

## 2019-10-29 ENCOUNTER — HOSPITAL ENCOUNTER (OUTPATIENT)
Dept: LAB | Age: 84
Discharge: HOME | End: 2019-10-29
Payer: MEDICARE

## 2019-10-29 ENCOUNTER — OFFICE VISIT (OUTPATIENT)
Dept: CARDIOLOGY CLINIC | Age: 84
End: 2019-10-29

## 2019-10-29 VITALS
OXYGEN SATURATION: 99 % | DIASTOLIC BLOOD PRESSURE: 70 MMHG | RESPIRATION RATE: 18 BRPM | WEIGHT: 219.2 LBS | BODY MASS INDEX: 32.47 KG/M2 | HEART RATE: 68 BPM | HEIGHT: 69 IN | SYSTOLIC BLOOD PRESSURE: 122 MMHG

## 2019-10-29 DIAGNOSIS — I44.7 LBBB (LEFT BUNDLE BRANCH BLOCK): ICD-10-CM

## 2019-10-29 DIAGNOSIS — I50.22 CHRONIC SYSTOLIC CONGESTIVE HEART FAILURE (HCC): ICD-10-CM

## 2019-10-29 DIAGNOSIS — I48.0 PAF (PAROXYSMAL ATRIAL FIBRILLATION) (HCC): ICD-10-CM

## 2019-10-29 DIAGNOSIS — I50.21 ACUTE SYSTOLIC CONGESTIVE HEART FAILURE (HCC): Primary | ICD-10-CM

## 2019-10-29 DIAGNOSIS — E78.5 DYSLIPIDEMIA: ICD-10-CM

## 2019-10-29 DIAGNOSIS — I25.119 CORONARY ARTERY DISEASE INVOLVING NATIVE CORONARY ARTERY OF NATIVE HEART WITH ANGINA PECTORIS (HCC): ICD-10-CM

## 2019-10-29 PROCEDURE — 83880 ASSAY OF NATRIURETIC PEPTIDE: CPT

## 2019-10-29 PROCEDURE — 36415 COLL VENOUS BLD VENIPUNCTURE: CPT

## 2019-10-29 PROCEDURE — 82784 ASSAY IGA/IGD/IGG/IGM EACH: CPT

## 2019-10-29 PROCEDURE — 80048 BASIC METABOLIC PNL TOTAL CA: CPT

## 2019-10-29 PROCEDURE — 83883 ASSAY NEPHELOMETRY NOT SPEC: CPT

## 2019-10-29 NOTE — PROGRESS NOTES
ROOM # 9  [facility]-10/22-10/24/19- NSTEMI  Saw PCP yesterday  Denies any cardiac complaints at this time.    Started weighing himself yesterday  Visit Vitals  /70 (BP 1 Location: Left arm, BP Patient Position: Sitting)   Pulse 68   Resp 18   Ht 5' 9\" (1.753 m)   Wt 219 lb 3.2 oz (99.4 kg)   SpO2 99%   BMI 32.37 kg/m²

## 2019-10-29 NOTE — PATIENT INSTRUCTIONS
Continue your current meds  Please gets labs today on the 3rd floor; I will call you with results. Follow-up with MD again in 1 month. Someone will call you about setting up cardiac rehab.

## 2019-10-29 NOTE — PROGRESS NOTES
Theodora Arroyo, UMESH  [id number] Jr Yash Saldana  [place], [id number] [place] [address]    Office [phone]  Fax [phone]        Garrett Carvajal is a 80 y.o. male is here for f/u visit following recent admission; inpt 10/22 to 10/24 with CHF / NSTEMI. Primary care physician:  Lowell Bourne MD    Patient Active Problem List   Diagnosis Code    Acquired hypothyroidism E03.9    HTN (hypertension), benign I10    Esophageal reflux K21.9    CAD (coronary artery disease) I25.10    Cardiomyopathy ([facility] I42.9    GI bleed K92.2    Chronic anticoagulation Z79.01    PAF (paroxysmal atrial fibrillation) ([facility]) I48.0    LBBB (left bundle branch block) I44.7    High cholesterol E78.00    Obesity E66.9    CHF exacerbation ([facility]) I50.9    Elevated troponin R79.89    Chest pain R07.9    SOB (shortness of breath) R06.02    NSTEMI (non-ST elevated myocardial infarction) ([facility] I21.4    Acute respiratory failure with hypoxia ([facility] J96.01       Chief complaint:  Chief Complaint   Patient presents with   [facility] Follow Up     NSTEMI    CHF     Assessment / Plan:   HFrEF with acute hypoxic respiratory failure/pulmonary edema  - vol improved; LVEF 40%  - on ARB and BB therapy; given age with normotensive BP, will hold on further titration today  - lasix 20mg/d - will check updated BMP and pBNP  - echo recommended further eval to r/o cardiac amyloidosis; will check light chains and immunofixation to start  - cont daily weights / low sodium diet  - cardiac rehab     Non Obstructive CAD   - NSTEMI ?  type II - trop peak 0.68 in setting of CHF; discussed with Dr. Leana Christie; no plavix  - cardiac cath w/ non obstructive CAD  - cont ASA/statin   - no anginal c/o today    pAF  - RRR today   - [id number] Score 4 - not on AC due to hx of GI bleeding     LBBB    Dyslipidemia - ; goal <70  - on simvastatin 20mg/d  - states he often forgets to take; encouraged to use daily; recheck lipids in 3 to 6 months    F/u in 4 weeks or PRN     I appreciate the opportunity to be involved in Mr. Koby bond. See note below for details. Please do not hesitate to contact us with questions or concerns. Tanna Christina NP    Cardiac Testing/ Procedures:  10/22/19   ECHO ADULT COMPLETE 10/22/2019 10/22/2019    Narrative · Left Ventricle: Normal cavity size and systolic function (ejection   fraction normal). Moderate concentric hypertrophy. Calculated left   ventricular ejection fraction is 40%. Visually measured ejection fraction. Left ventricular global hypokinesis. Mild (grade 1) left ventricular   diastolic dysfunction. · Left Atrium: Dilated left atrium. · Aortic Valve: Moderate aortic valve sclerosis. Aortic valve leaflet   calcification present. Aortic valve mean gradient is 8 mmHg. · Mitral Valve: Moderate mitral annular calcification. Mild mitral valve   regurgitation is present. · Pulmonary Artery: Pulmonary arterial systolic pressure is 33 mmHg. · Tricuspid Valve: Mild tricuspid valve regurgitation is present. · Recommend further evaluation for r/o cardiac amyloidosis if supported by   clinical presentation. Signed by: Nolan Interiano MD     10/22/19   CARDIAC PROCEDURE 10/23/2019 10/23/2019    Narrative R CFA access - ultrasound/fluoroscopic/micropuncture access    L Main: Large; Nml    LAD: Ca++; Prox - large; Mid/distal - small to med; Mid LAD at D2 - 60%;   D1 - Ca++- prox 30%;  D2 - Med; MLI    LCflex: Prox - Med to large ; Mid - med 40%; Small OM1/OM2; Med OM3/OM4; Inf branch of OM4 - 70%- small vessel    RCA: Dominant; Large; Mid 40%; PDA and PLB - med - MLI    LVEDP: elevated 25    iFR - LAD - 0.91 not signifcant  IVUS LAD - significant ( >270 degrees ) calcium    No significant gradient across aortic valve.       Specimens Removed : None    Complications: None    Closure Device: Perclose     Signed by: Kriss Osman MD       Subjective:  Myra Corcoran is a 80 y.o. male presented with acute hypoxic respiratory failure. He started developing shortness of breath 2-3 days prior to admit. Associated with dyspnea was chest discomfort. He described CP as both pressure and sharp left sided pain. Had productive cough and found himself getting up in the middle of the night 2/2 symptoms. CT in ED without evidence of acute event. ECG sinus rhythm with LBBB (old). CXR with mild pulmonary edema. Upon arrival to ED he was on bipap from EMS due to SaO2 of 85%. Echo with newly reduced LVEF 40%, LV global hypokinesis, moderate aortic valve sclerosis, and mild MR/TR. Troponin minimally elevated. Underwent cardiac cath 10/23 found to have non-obstructive disease. LVEDP 25.  Pt started on lasix at discharge. Hospital records reviewed; pt here today for discharge follow-up. Denies cardiac complaints since discharge and feels much better. No recurrent chest pain. Is interested in cardiac rehab. Patient denies any chest pain, new dyspnea, palpitations, syncope, orthopnea, edema, or paroxysmal nocturnal dyspnea. Taking lasix 20mg/d as prescribed. Pt is weighing himself daily. Denies dizziness or lightheadedness. Medications before admission:      Current Outpatient Medications:     furosemide (LASIX) 20 mg tablet, Take 1 Tab by mouth daily. , Disp: 30 Tab, Rfl: 1    memantine (NAMENDA) 10 mg tablet, Take 10 mg by mouth two (2) times a day., Disp: , Rfl:     nortriptyline (PAMELOR) 10 mg capsule, Take 10 mg by mouth nightly., Disp: , Rfl:     artificial tears, dextran 70-hypromellose, (NATURAL BALANCE TEARS) 0.1-0.3 % ophthalmic solution, Administer 1 Drop to both eyes as needed (dry eyes). , Disp: , Rfl:     losartan (COZAAR) 100 mg tablet, Take 1 Tab by mouth daily. , Disp: 90 Tab, Rfl: 1    famotidine (PEPCID) 20 mg tablet, Take 20 mg by mouth two (2) times a day., Disp: , Rfl:     CALCIUM CARBONATE (CALCIUM 500 PO), Take 1 Tab by mouth daily. , Disp: , Rfl:     levothyroxine (SYNTHROID) 75 mcg tablet, Take 75 mcg by mouth Daily (before breakfast). , Disp: , Rfl:     carvedilol (COREG) 25 mg tablet, TAKE ONE TABLET BY MOUTH TWICE A DAY WITH MEALS, Disp: 60 Tab, Rfl: 1    loratadine (CLARITIN) 10 mg tablet, Take 10 mg by mouth daily. , Disp: , Rfl:     polyethylene glycol (MIRALAX) 17 gram packet, Take 17 g by mouth daily. , Disp: , Rfl:     psyllium (METAMUCIL) packet, Take 1 Packet by mouth daily. , Disp: , Rfl:     simvastatin (ZOCOR) 20 mg tablet, Take 20 mg by mouth nightly., Disp: , Rfl:     aspirin 81 mg tablet, Take 81 mg by mouth nightly., Disp: , Rfl:     MULTIVITAMIN PO, Take 1 Tab by mouth daily. , Disp: , Rfl:     diphenhydrAMINE-acetaminophen (TYLENOL PM EXTRA STRENGTH)  mg tab, Take 2 Tabs by mouth nightly as needed (sleep). , Disp: , Rfl:     Physical Exam:  Visit Vitals  /70 (BP 1 Location: Left arm, BP Patient Position: Sitting)   Pulse 68   Resp 18   Ht 5' 9\" (1.753 m)   Wt 219 lb 3.2 oz (99.4 kg)   SpO2 99%   BMI 32.37 kg/m²       Gen: Well-developed, well-nourished, in no acute distress  Neck: Supple,No JVD,    Resp: No accessory muscle use, Dec AE Bilat, No rales or rhonchi  Card: Regular Rate,Rythm,Normal S1, S2, No murmurs, rubs or gallop. Abd:  Soft, non-tender, non-distended,BS+,   MSK: No cyanosis  Neuro: moving all four extremities , follows commands appropriately  Psych:  Good insight, oriented to person, place , alert, Nml Affect  LE: No edema      LABS:  Results for Alonso Aldana (MRN [id number]) as of 10/29/2019 10:17   Ref. Range 10/22/2019 01:06 10/22/2019 06:35 10/22/2019 15:01   Troponin-I, Qt. Latest Ref Range: <0.05 ng/mL 0.08 (H) 0.68 (H) 0.52 (H)     Results for Alonso Aldana (MRN [id number]) as of 10/29/2019 10:17   Ref.  Range 10/22/2019 01:06 10/23/2019 01:18   NT pro-BNP Latest Ref Range: <450 PG/ML 3,612 (H) 2,109 (H)     Lab Results   Component Value Date/Time    Sodium 133 (L) 10/24/2019 02:11 AM    Potassium 3.5 10/24/2019 02:11 AM Chloride 101 10/24/2019 02:11 AM    CO2 25 10/24/2019 02:11 AM    Anion gap 7 10/24/2019 02:11 AM    Glucose 113 (H) 10/24/2019 02:11 AM    BUN 19 10/24/2019 02:11 AM    Creatinine 1.20 10/24/2019 02:11 AM    BUN/Creatinine ratio 16 10/24/2019 02:11 AM    GFR est AA >60 10/24/2019 02:11 AM    GFR est non-AA 58 (L) 10/24/2019 02:11 AM    Calcium 8.7 10/24/2019 02:11 AM     Lab Results   Component Value Date/Time    WBC 11.5 (H) 10/24/2019 02:11 AM    Hemoglobin (POC) 14.6 05/05/2014 10:35 AM    HGB 14.6 10/24/2019 02:11 AM    Hematocrit (POC) 43 05/05/2014 10:35 AM    HCT 42.9 10/24/2019 02:11 AM    PLATELET 530 86/11/0725 02:11 AM    MCV 89.4 10/24/2019 02:11 AM     Lab Results   Component Value Date/Time    Cholesterol, total 254 (H) 10/22/2019 01:06 AM    HDL Cholesterol 62 10/22/2019 01:06 AM    LDL, calculated 149.4 (H) 10/22/2019 01:06 AM    VLDL, calculated 42.6 10/22/2019 01:06 AM    Triglyceride 213 (H) 10/22/2019 01:06 AM    CHOL/HDL Ratio 4.1 10/22/2019 01:06 AM     Lab Results   Component Value Date/Time    TSH 1.300 05/19/2014 12:01 PM    T4, Free 1.0 05/05/2014 10:30 AM     Lab Results   Component Value Date/Time    Hemoglobin A1c 5.8 10/22/2019 06:35 AM       José Miguel Torres NP

## 2019-10-30 ENCOUNTER — TELEPHONE (OUTPATIENT)
Dept: CARDIAC REHAB | Age: 84
End: 2019-10-30

## 2019-10-30 NOTE — TELEPHONE ENCOUNTER
10/30/2019 Cardiac Rehab at [facility]: Contacted . Sahra García to discuss participation in the Cardiac Rehab Program. Left voicemail message asking for a return call. If there is no return call, follow up is planned for 11/4/2019.  Elina Agrawal

## 2019-11-01 LAB
BUN SERPL-MCNC: 12 MG/DL (ref 8–27)
BUN/CREAT SERPL: 9 (ref 10–24)
CALCIUM SERPL-MCNC: 9.9 MG/DL (ref 8.6–10.2)
CHLORIDE SERPL-SCNC: 99 MMOL/L (ref 96–106)
CO2 SERPL-SCNC: 26 MMOL/L (ref 20–29)
CREAT SERPL-MCNC: 1.34 MG/DL (ref 0.76–1.27)
GLUCOSE SERPL-MCNC: 125 MG/DL (ref 65–99)
IGA SERPL-MCNC: 212 MG/DL (ref 61–437)
IGG SERPL-MCNC: 1140 MG/DL (ref 700–1600)
IGM SERPL-MCNC: 47 MG/DL (ref 15–143)
INTERPRETATION: NORMAL
KAPPA LC FREE SER-MCNC: 29.7 MG/L (ref 3.3–19.4)
KAPPA LC FREE/LAMBDA FREE SER: 1.42 {RATIO} (ref 0.26–1.65)
LAMBDA LC FREE SERPL-MCNC: 20.9 MG/L (ref 5.7–26.3)
NT-PROBNP SERPL-MCNC: 991 PG/ML (ref 0–486)
POTASSIUM SERPL-SCNC: 4.2 MMOL/L (ref 3.5–5.2)
PROT PATTERN SERPL IFE-IMP: NORMAL
SODIUM SERPL-SCNC: 139 MMOL/L (ref 134–144)

## 2019-11-05 ENCOUNTER — TELEPHONE (OUTPATIENT)
Dept: CARDIOLOGY CLINIC | Age: 84
End: 2019-11-05

## 2019-11-05 RX ORDER — FUROSEMIDE 20 MG/1
20 TABLET ORAL AS NEEDED
Qty: 30 TAB | Refills: 1
Start: 2019-11-05 | End: 2021-09-09

## 2019-11-05 NOTE — TELEPHONE ENCOUNTER
Labs 10/29/19 - mild VU with improved pBNP - will change lasix to PRN use only. Pt weighs daily at home - will use if worsening SOB or weight gain >3lbs in 1 day or >5lbs. States he feels great currently  - has close f/u scheduled. Initial amyloid labs inconclusive. Would need urine studies as well as PYP scanning to further evaluate. Will discuss with MD prior to additional testing.       Component      Latest Ref Rng & Units 10/29/2019 10/29/2019 10/29/2019 10/29/2019          11:19 AM 11:19 AM 11:19 AM 11:19 AM   Glucose      65 - 99 mg/dL    125 (H)   BUN      8 - 27 mg/dL    12   Creatinine      0.76 - 1.27 mg/dL    1.34 (H)   GFR est non-AA      >59 mL/min/1.73    48 (L)   GFR est AA      >59 mL/min/1.73    56 (L)   BUN/Creatinine ratio      10 - 24    9 (L)   Sodium      134 - 144 mmol/L    139   Potassium      3.5 - 5.2 mmol/L    4.2   Chloride      96 - 106 mmol/L    99   CO2      20 - 29 mmol/L    26   Calcium      8.6 - 10.2 mg/dL    9.9   Immunofixation, serum         Comment    Immunoglobulin G, Qt.      700 - 1,600 mg/dL   1,140    Immunoglobulin A, Qt.      61 - 437 mg/dL   212    Immunoglobulin M, Qt.      15 - 143 mg/dL   47    Free Kappa Lt Chains, serum      3.3 - 19.4 mg/L  29.7 (H)     Free Lambda Lt Chains, serum      5.7 - 26.3 mg/L  20.9     Kappa/Lambda ratio, serum      0.26 - 1.65  1.42     Magnesium      1.6 - 2.4 mg/dL       NT pro-BNP      0 - 486 pg/mL 991 (H)        Component      Latest Ref Rng & Units 10/24/2019           2:11 AM   Glucose      65 - 99 mg/dL    BUN      8 - 27 mg/dL    Creatinine      0.76 - 1.27 mg/dL    GFR est non-AA      >59 mL/min/1.73    GFR est AA      >59 mL/min/1.73    BUN/Creatinine ratio      10 - 24    Sodium      134 - 144 mmol/L    Potassium      3.5 - 5.2 mmol/L    Chloride      96 - 106 mmol/L    CO2      20 - 29 mmol/L    Calcium      8.6 - 10.2 mg/dL    Immunofixation, serum          Immunoglobulin G, Qt.      700 - 1,600 mg/dL Immunoglobulin A, Qt.      61 - 437 mg/dL    Immunoglobulin M, Qt.      15 - 143 mg/dL    Free Kappa Lt Chains, serum      3.3 - 19.4 mg/L    Free Lambda Lt Chains, serum      5.7 - 26.3 mg/L    Kappa/Lambda ratio, serum      0.26 - 1.65    Magnesium      1.6 - 2.4 mg/dL 1.9   NT pro-BNP      0 - 486 pg/mL

## 2019-11-08 ENCOUNTER — HOSPITAL ENCOUNTER (OUTPATIENT)
Dept: CARDIAC REHAB | Age: 84
Discharge: HOME | End: 2019-11-08
Payer: MEDICARE

## 2019-11-08 VITALS — HEIGHT: 69 IN | WEIGHT: 218.5 LBS | BODY MASS INDEX: 32.36 KG/M2

## 2019-11-08 PROCEDURE — 93798 PHYS/QHP OP CAR RHAB W/ECG: CPT

## 2019-11-08 NOTE — CARDIO/PULMONARY
Cardiac Rehab:  Prashanth ALBA [birth year] presented for cardiac rehab orientation and exercise tolerance test today with a primary diagnosis of NSTEMI and CHF . Pt has history of HFrEF. LVEF is 40% . Cardiac risk factors include: HTN, HLD, age, gender, obesity, and sedentary lifestyle. CAD risk factors were reviewed with patient. Pt has been  since , and lives at [facility]( [facility] Has supportive family; 2 sons live locally, and a daughter in [place]. Pt is retired from [employer]. Depression score PHQ9 is 9 and this is considered moderate. The result was discussed with patient, who affirms score to be accurate. He has Pamelor on his med list, but was unsure if he was actually taking this. Pt reported has memory limitations and is on Namenda. Plan for pt to bring in all this prescription meds to confirm that he is taking his meds correctly. Patient denied chest pain or SOB during 6 minute walk on treadmill at 2.2 mph, with RPE 12. Cardiac rhythm was SB/SR with rare PAC & PJC. His recovery BP was significantly higher than his exercise BP, possibly due to anxiety. Pt was given a cardiac rehab manual and his exercise plan was developed. Pt will attend cardiac rehab 2-3 times per week. Pt indicated plan to start using the exercise room at his independent [facility] facility.

## 2019-11-11 ENCOUNTER — HOSPITAL ENCOUNTER (OUTPATIENT)
Dept: CARDIAC REHAB | Age: 84
Discharge: HOME | End: 2019-11-11
Payer: MEDICARE

## 2019-11-11 VITALS — BODY MASS INDEX: 32.49 KG/M2 | WEIGHT: 220 LBS

## 2019-11-11 PROCEDURE — 93798 PHYS/QHP OP CAR RHAB W/ECG: CPT

## 2019-11-11 RX ORDER — GUAIFENESIN 400 MG/1
400 TABLET ORAL DAILY
COMMUNITY

## 2019-11-14 ENCOUNTER — HOSPITAL ENCOUNTER (OUTPATIENT)
Dept: CARDIAC REHAB | Age: 84
Discharge: HOME | End: 2019-11-14
Payer: MEDICARE

## 2019-11-14 VITALS — BODY MASS INDEX: 32.49 KG/M2 | WEIGHT: 220 LBS

## 2019-11-14 PROCEDURE — 93798 PHYS/QHP OP CAR RHAB W/ECG: CPT

## 2019-11-15 ENCOUNTER — PATIENT OUTREACH (OUTPATIENT)
Dept: FAMILY MEDICINE CLINIC | Age: 84
End: 2019-11-15

## 2019-11-18 ENCOUNTER — OFFICE VISIT (OUTPATIENT)
Dept: CARDIOLOGY CLINIC | Age: 84
End: 2019-11-18

## 2019-11-18 VITALS
BODY MASS INDEX: 32.64 KG/M2 | SYSTOLIC BLOOD PRESSURE: 150 MMHG | WEIGHT: 220.4 LBS | HEART RATE: 61 BPM | OXYGEN SATURATION: 98 % | DIASTOLIC BLOOD PRESSURE: 84 MMHG | HEIGHT: 69 IN

## 2019-11-18 DIAGNOSIS — I42.9 CARDIOMYOPATHY, UNSPECIFIED TYPE (HCC): Chronic | ICD-10-CM

## 2019-11-18 DIAGNOSIS — I48.0 PAF (PAROXYSMAL ATRIAL FIBRILLATION) (HCC): ICD-10-CM

## 2019-11-18 DIAGNOSIS — I25.119 CORONARY ARTERY DISEASE INVOLVING NATIVE CORONARY ARTERY OF NATIVE HEART WITH ANGINA PECTORIS (HCC): Primary | Chronic | ICD-10-CM

## 2019-11-18 RX ORDER — AMLODIPINE BESYLATE 5 MG/1
5 TABLET ORAL DAILY
Qty: 30 TAB | Refills: 3 | Status: SHIPPED | OUTPATIENT
Start: 2019-11-18 | End: 2020-02-21 | Stop reason: SDUPTHER

## 2019-11-18 NOTE — LETTER
11/18/19 Patient: Ac Carmen YOB: 1935 Date of Visit: 11/18/2019 Raghu Pradhan, [address] [id number] VIA Facsimile: [phone] Dear Raghu Pradhan MD, Thank you for referring Mr. Cristal Pastrana to [address] [place] [address] for evaluation. My notes for this consultation are attached. If you have questions, please do not hesitate to call me. I look forward to following your patient along with you. Sincerely, Óscar Rubio MD

## 2019-11-18 NOTE — PROGRESS NOTES
Jermaine Tobias is a 80 y.o. male    Chief Complaint   Patient presents with    Hypertension    Coronary Artery Disease    Other     PAF, LBBB       Chest pain No    SOB No    Dizziness No    Swelling No    Refills No    Visit Vitals  /84 (BP 1 Location: Left arm, BP Patient Position: Sitting)   Pulse 61   Ht 5' 9\" (1.753 m)   Wt 220 lb 6.4 oz (100 kg)   SpO2 98%   BMI 32.55 kg/m²       1. Have you been to the ER, urgent care clinic since your last visit? Hospitalized since your last visit? [facility]/22-10/24    2. Have you seen or consulted any other health care providers outside of the [address] [place] [address] since your last visit? Include any pap smears or colon screening.   No

## 2019-11-18 NOTE — PROGRESS NOTES
Addendum 11/5/19:   Labs 10/29/19 - mild VU with improved pBNP - will change lasix to PRN use only. Pt weighs daily at home - will use if worsening SOB or weight gain >3lbs in 1 day or >5lbs. States he feels great currently.      Initial amyloid labs inconclusive. Would need urine studies as well as PYP scanning to further evaluate. Pt to discuss with MD next visit.

## 2019-11-18 NOTE — PROGRESS NOTES
Stanley Rosa MD    [id number] [facility] [place], [address] [facility]    Office [phone],[facility] [phone]  Pager [phone]    Beto Kruger is a 80 y.o. male is here for f/u visit - Afib    Primary care physician:  Prabha Oleary MD    Patient Active Problem List   Diagnosis Code    Acquired hypothyroidism E03.9    HTN (hypertension), benign I10    Esophageal reflux K21.9    CAD (coronary artery disease) I25.10    Cardiomyopathy ([address] I42.9    GI bleed K92.2    Chronic anticoagulation Z79.01    PAF (paroxysmal atrial fibrillation) ([facility]) I48.0    LBBB (left bundle branch block) I44.7    High cholesterol E78.00    Obesity E66.9    CHF exacerbation ([facility]) I50.9    Elevated troponin R79.89    Chest pain R07.9    SOB (shortness of breath) R06.02    NSTEMI (non-ST elevated myocardial infarction) ([facility] [address] I21.4    Acute respiratory failure with hypoxia ([facility] [address] J96.01       Chief complaint:  Chief Complaint   Patient presents with    Hypertension    Coronary Artery Disease    Other     PAF, LBBB       Assessment:    PAF - - (was on amiodaraone - now off it ( office visit 9/18/14)- \"pt not sure - thought he was supposed to come off it\". Was not restarted. He was on Eliquis but had a GI bleed in October 2016 (please see discussion below) and is now on aspirin.)  HTN  LBBB ( old)  Hx of NSVT  Non obstructive CAD by cath 5/2014; Small  Vessel dz/non obstructive CAD 10/2019  Cardiomyopathy -EF 90%/VATJF 1 diastolic dysfunction. Combination of ischemic/nonischemic. Chronic dizziness - hx of BPV  \"Cool extremeties- admn 5/5/14 - seen by Dr [place] Best 5/12/14 - SERAFIN - at ankle (Nml SERAFIN 1.3 bilat with PVR) - neg u/s for DVT and VQ scan\"  (Hx of Anemia w/u 5/5/14 - Dr Avis Orellana - Mild antral gastritis/ Mild sigmoid diverticulosis and small internal hemorrhoids, otherwise mucosa within normal. - per pt -anemia resolved-recurrent GI bleed October 2016. As per the discussion during the admission, patient did not want any further workup for his diverticulitis history/repeat procedures as part of the gastroenterologist note and it was recommended that because of the 25% risk of rebleeding if Eliquis was restarted, patient would be on aspirin alone.)            Plan:   Cardiac MRI-cardiomyopathy/concern for amyoidosis on echo  On ASA/ B Blocker/ARB /statin-not on ACE inhibitor  Add Norvasc 5mg daily. rrrrrrrrrr  Daily wts  Aggressive CV risk factor modification  Lipids perPCP  F/u appt in 2 mths/prn        I appreciate the opportunity to be involved in Mr. Ríos. See note below for details. Please do not hesitate to contact us with questions or concerns. ATTENTION: f  This medical record was transcribed using an electronic medical records/speech recognition system. Although proofread, it may and can contain electronic, spelling and other errors. Corrections may be executed at a later time. Please feel free to contact us for any clarifications as needed. Olga Ayoub MD    Cardiac Testing/ Procedures: A. Cardiac Cath/PCI:10/23/19 R CFA access - ultrasound/fluoroscopic/micropuncture access     L Main: Large; Nml     LAD: Ca++; Prox - large; Mid/distal - small to med; Mid LAD at D2 - 60%; D1 - Ca++- prox 30%;  D2 - Med; MLI     LCflex: Prox - Med to large ; Mid - med 40%; Small OM1/OM2; Med OM3/OM4; Inf branch of OM4 - 70%- small vessel     RCA: Dominant; Large; Mid 40%; PDA and PLB - med - MLI     LVEDP: elevated 25     iFR - LAD - 0.91 not signifcant  IVUS LAD - significant ( >270 degrees ) calcium     No significant gradient across aortic valve.        Specimens Removed : None     Complications: None   4/8/49  L Main: MLI  LAD: Med to Large prox ; Mid and distal LAD - small to med;  At D1 - tandem  50% lesions; D1 - small to med - MLI  LCflex: Large; MLI; OM1;OM2 - MLI  RCA: Subselective inj ( WRP catheter) - tortuous; MLI  LVEDP: 10  LVEF: 40%  No significant gradient across aortic valve.    RHC findings:  RAP= 2 mmHg  RVSP= 31 mmHg  PAP= 15 mmHg  PCWP= 6 mmHg  CO= 5.61 l/min  CI=2.64  Closure Device: Manual          B.ECHO/KIRT: 10/22/19 -EF 40%. Grade 1 diastolic dysfunction. Dilated LA. Moderate aortic valve sclerosis without stenosis. Mild MR. Mild tricuspid regurgitation. ?  Cardiac amyloidosis pattern recommend further evaluation    1/11/19-EF 57-01%, grade 1 diastolic dysfunction, mildly dilated LAD, PAS P 34 mmHg    12/8/15 Left ventricle: Systolic function was normal by EF (biplane method of  disks). Ejection fraction was estimated to be 50 %. There were no regional  wall motion abnormalities. Doppler parameters were consistent with  abnormal left ventricular relaxation (grade 1 diastolic dysfunction). Left atrium: The atrium was moderately dilated. Mitral valve: There was mild annular calcification. There was mild  regurgitation. Aortic valve: Normal valve structure. Leaflets exhibited mild  calcification and sclerosis. Tricuspid valve: There was moderate regurgitation. 9/12/14 Left ventricle: Systolic function was mildly reduced. Ejection fraction  was estimated to be 50 %. There was moderate diffuse hypokinesis. There  was severe hypokinesis of the basal-mid inferior wall(s). Doppler  parameters were consistent with abnormal left ventricular relaxation  (grade 1 diastolic dysfunction). Left atrium: The atrium was moderately to markedly dilated. Right atrium: The atrium was dilated. Mitral valve: There was mild annular calcification. There was mild  regurgitation. Tricuspid valve: There was mild regurgitation. 5/6/14 Left ventricle: Size was normal. Systolic function was markedly reduced. Ejection fraction was estimated to be 25 %. There was severe diffuse  hypokinesis with distinct regional wall motion abnormalities. Doppler  parameters were consistent with abnormal left ventricular relaxation  (grade 1 diastolic dysfunction).     Right ventricle: The size was normal. Systolic function was normal.    Left atrium: The atrium was mildly dilated. Mitral valve: There was moderate annular calcification. There was mild  regurgitation. Regurgitation grade was 1+ on a scale of 0 to 4+. C.StressNuclear/Stress ECHO/Stress test: 1/11/19-normal Nila nuclear study. EF 43%    D. Vascular:    E. EP:    F. Miscellaneous:    Subjective:  Jori Juárez is a 80 y.o. male who returns for follow up visit. No chest pain, dyspnea. No swelling lower extremities. Blood pressures running high at cardiac rehab. (GI bleed in October 2016 when his Eliquis was stopped. As per the discussion during the admission, patient did not want any further workup for his diverticulitis history/repeat procedures as part of the gastroenterologist note and it was recommended that because of the 25% risk of rebleeding if Eliquis was restarted, patient would be on aspirin alone. He is at [facility] and is in a cottage. His son owns a [employer]  and checks on him. Patient was  for 60+ years and  lost his wife in 2015.)        (Jori Juárez is a 66 y.o. male with a hx of htn, dyslipidemia, hypothyroidism, Gout and prostate cancer s/p prostatectomy admitted for worsened lethargy and dizziness to [facility] on 5/5/14. [facility] Hx of  afib with RVR the pt was started on diltiazem but is remaining in the 120-130 range. EKG showed a LBBB in addition to afib. Stress test with Myoview on 1/2013 showed an EF of 62% with no evidence of ischemia. Hx of prior LBBB. Hx of chronic dizziness - BPV. (not assoc with afib). ECHO showed EF of 67% (  clicnically well compensated) - underwent RHC/LHC - non obstructive CAD. Also had  NSVT. Seen by  [facility]. Started on Amiodarone and Eliquis. During office visit 9/2014 - was not taking amiodarone.  Was in sinus erica and it was not restarted.)                   ROS:  (bold if positive, if negative)             Medications before admission:      Current Outpatient Medications:     guaiFENesin (ORGANIDIN) 400 mg tablet, Take 400 mg by mouth daily. Indications: CONGESTION, Disp: , Rfl:     furosemide (LASIX) 20 mg tablet, Take 1 Tab by mouth as needed (swelling, weight gain, shortness of breath). , Disp: 30 Tab, Rfl: 1    memantine (NAMENDA) 10 mg tablet, Take 10 mg by mouth two (2) times a day., Disp: , Rfl:     nortriptyline (PAMELOR) 10 mg capsule, Take 20 mg by mouth nightly. Indications: SLEEP & HEADACHE PREVENTION, Disp: , Rfl:     artificial tears, dextran 70-hypromellose, (NATURAL BALANCE TEARS) 0.1-0.3 % ophthalmic solution, Administer 1 Drop to both eyes as needed (dry eyes). , Disp: , Rfl:     diphenhydrAMINE-acetaminophen (TYLENOL PM EXTRA STRENGTH)  mg tab, Take 2 Tabs by mouth nightly as needed (sleep). , Disp: , Rfl:     losartan (COZAAR) 100 mg tablet, Take 1 Tab by mouth daily. , Disp: 90 Tab, Rfl: 1    famotidine (PEPCID) 20 mg tablet, Take 20 mg by mouth two (2) times a day., Disp: , Rfl:     CALCIUM CARBONATE (CALCIUM 500 PO), Take 1 Tab by mouth daily. , Disp: , Rfl:     levothyroxine (SYNTHROID) 75 mcg tablet, Take 75 mcg by mouth Daily (before breakfast). , Disp: , Rfl:     carvedilol (COREG) 25 mg tablet, TAKE ONE TABLET BY MOUTH TWICE A DAY WITH MEALS, Disp: 60 Tab, Rfl: 1    loratadine (CLARITIN) 10 mg tablet, Take 10 mg by mouth daily. , Disp: , Rfl:     polyethylene glycol (MIRALAX) 17 gram packet, Take 17 g by mouth daily. , Disp: , Rfl:     psyllium (METAMUCIL) packet, Take 1 Packet by mouth daily. , Disp: , Rfl:     simvastatin (ZOCOR) 20 mg tablet, Take 20 mg by mouth nightly., Disp: , Rfl:     aspirin 81 mg tablet, Take 81 mg by mouth nightly., Disp: , Rfl:     MULTIVITAMIN PO, Take 1 Tab by mouth daily. , Disp: , Rfl:         Physical Exam:  Visit Vitals  /84 (BP 1 Location: Left arm, BP Patient Position: Sitting)   Pulse 61   Ht 5' 9\" (1.753 m)   Wt 220 lb 6.4 oz (100 kg)   SpO2 98% BMI 32.55 kg/m²          Gen: Well-developed, well-nourished, in no acute distress  Neck: Supple,No JVD, No Carotid Bruit,   Resp: No accessory muscle use, Dec AE Bilat, No rales or rhonchi  Card: Regular Rate,Rythm,Normal S1, S2, No murmurs, rubs or gallop.    Abd:  Soft, non-tender, non-distended,BS+,   MSK: No cyanosis  Neuro: moving all four extremities , follows commands appropriately  Psych:  Good insight, oriented to person, place , alert, Nml Affect  LE: No edema    EKG:      LABS:        Lab Results   Component Value Date/Time    WBC 11.5 (H) 10/24/2019 02:11 AM    HGB 14.6 10/24/2019 02:11 AM    HCT 42.9 10/24/2019 02:11 AM    PLATELET 834 77/39/4373 02:11 AM    MCV 89.4 10/24/2019 02:11 AM     Lab Results   Component Value Date/Time    Sodium 139 10/29/2019 11:19 AM    Potassium 4.2 10/29/2019 11:19 AM    Chloride 99 10/29/2019 11:19 AM    CO2 26 10/29/2019 11:19 AM    Anion gap 7 10/24/2019 02:11 AM    Glucose 125 (H) 10/29/2019 11:19 AM    BUN 12 10/29/2019 11:19 AM    Creatinine 1.34 (H) 10/29/2019 11:19 AM    BUN/Creatinine ratio 9 (L) 10/29/2019 11:19 AM    GFR est AA 56 (L) 10/29/2019 11:19 AM    GFR est non-AA 48 (L) 10/29/2019 11:19 AM    Calcium 9.9 10/29/2019 11:19 AM       Lab Results   Component Value Date/Time    aPTT 57.0 (H) 10/23/2019 05:10 AM     Lab Results   Component Value Date/Time    INR 1.1 10/02/2016 09:16 AM    INR 1.0 05/05/2014 10:30 AM    Prothrombin time 10.8 10/02/2016 09:16 AM    Prothrombin time 10.5 05/05/2014 10:30 AM     No components found for: Patricia Calderon MD

## 2019-11-19 ENCOUNTER — HOSPITAL ENCOUNTER (OUTPATIENT)
Dept: CARDIAC REHAB | Age: 84
Discharge: HOME | End: 2019-11-19
Payer: MEDICARE

## 2019-11-19 VITALS — WEIGHT: 221 LBS | BODY MASS INDEX: 32.64 KG/M2

## 2019-11-19 PROCEDURE — 93798 PHYS/QHP OP CAR RHAB W/ECG: CPT

## 2019-11-20 ENCOUNTER — PATIENT OUTREACH (OUTPATIENT)
Dept: FAMILY MEDICINE CLINIC | Age: 84
End: 2019-11-20

## 2019-11-22 ENCOUNTER — HOSPITAL ENCOUNTER (OUTPATIENT)
Dept: CARDIAC REHAB | Age: 84
Discharge: HOME | End: 2019-11-22
Payer: MEDICARE

## 2019-11-22 VITALS — BODY MASS INDEX: 32.19 KG/M2 | WEIGHT: 218 LBS

## 2019-11-22 PROCEDURE — 93798 PHYS/QHP OP CAR RHAB W/ECG: CPT

## 2019-12-04 ENCOUNTER — APPOINTMENT (OUTPATIENT)
Dept: CARDIAC REHAB | Age: 84
End: 2019-12-04
Payer: MEDICARE

## 2019-12-06 ENCOUNTER — HOSPITAL ENCOUNTER (OUTPATIENT)
Dept: CARDIAC REHAB | Age: 84
Discharge: HOME | End: 2019-12-06
Payer: MEDICARE

## 2019-12-06 VITALS — BODY MASS INDEX: 32.49 KG/M2 | WEIGHT: 220 LBS

## 2019-12-06 PROCEDURE — 93798 PHYS/QHP OP CAR RHAB W/ECG: CPT

## 2019-12-12 ENCOUNTER — HOSPITAL ENCOUNTER (OUTPATIENT)
Dept: CARDIAC REHAB | Age: 84
Discharge: HOME | End: 2019-12-12
Payer: MEDICARE

## 2019-12-12 VITALS — WEIGHT: 219 LBS | BODY MASS INDEX: 32.34 KG/M2

## 2019-12-12 PROCEDURE — 93798 PHYS/QHP OP CAR RHAB W/ECG: CPT

## 2019-12-17 ENCOUNTER — PATIENT OUTREACH (OUTPATIENT)
Dept: FAMILY MEDICINE CLINIC | Age: 84
End: 2019-12-17

## 2019-12-17 ENCOUNTER — APPOINTMENT (OUTPATIENT)
Dept: CARDIAC REHAB | Age: 84
End: 2019-12-17
Payer: MEDICARE

## 2019-12-19 ENCOUNTER — HOSPITAL ENCOUNTER (OUTPATIENT)
Dept: CARDIAC REHAB | Age: 84
Discharge: HOME | End: 2019-12-19
Payer: MEDICARE

## 2019-12-19 VITALS — BODY MASS INDEX: 32.19 KG/M2 | WEIGHT: 218 LBS

## 2019-12-19 PROCEDURE — 93798 PHYS/QHP OP CAR RHAB W/ECG: CPT

## 2019-12-23 ENCOUNTER — APPOINTMENT (OUTPATIENT)
Dept: CARDIAC REHAB | Age: 84
End: 2019-12-23
Payer: MEDICARE

## 2020-01-03 RX ORDER — LOSARTAN POTASSIUM 100 MG/1
TABLET ORAL
Qty: 30 TAB | Refills: 2 | Status: SHIPPED | OUTPATIENT
Start: 2020-01-03 | End: 2020-05-05

## 2020-01-07 ENCOUNTER — HOSPITAL ENCOUNTER (OUTPATIENT)
Dept: CARDIAC REHAB | Age: 85
Discharge: HOME | End: 2020-01-07
Payer: MEDICARE

## 2020-01-07 VITALS — WEIGHT: 220 LBS | BODY MASS INDEX: 32.49 KG/M2

## 2020-01-07 PROCEDURE — 93798 PHYS/QHP OP CAR RHAB W/ECG: CPT

## 2020-01-09 ENCOUNTER — HOSPITAL ENCOUNTER (OUTPATIENT)
Dept: CARDIAC REHAB | Age: 85
Discharge: HOME | End: 2020-01-09
Payer: MEDICARE

## 2020-01-09 NOTE — CARDIO/PULMONARY
Cardiac Rehab: Pt was unable to exercise today due to elevated BP. Initial /89 with HR 65. After additional 5 min of rest, /100. Pt indicated he was feeling aggravated. Allowed pt to warm up on [employer] bike for 5 minutes at slow speed and rechecked /93. Explained to pt that his BP was too high to exercise in cardiac rehab today. Pt was unsure if he had started taking amlodipine 5 mg ordered by Dr Waqar Bynum last November. Pt will check his home medicine bottles and check with his pharmacy to confirm whether he is taking amlodipine as ordered.

## 2020-01-15 ENCOUNTER — HOSPITAL ENCOUNTER (OUTPATIENT)
Dept: MRI IMAGING | Age: 85
Discharge: HOME | End: 2020-01-15
Attending: INTERNAL MEDICINE
Payer: MEDICARE

## 2020-01-15 ENCOUNTER — PATIENT OUTREACH (OUTPATIENT)
Dept: FAMILY MEDICINE CLINIC | Age: 85
End: 2020-01-15

## 2020-01-15 VITALS — BODY MASS INDEX: 32.78 KG/M2 | WEIGHT: 222 LBS

## 2020-01-15 DIAGNOSIS — I42.9 CARDIOMYOPATHY, UNSPECIFIED TYPE (HCC): Chronic | ICD-10-CM

## 2020-01-15 DIAGNOSIS — I25.119 CORONARY ARTERY DISEASE INVOLVING NATIVE CORONARY ARTERY OF NATIVE HEART WITH ANGINA PECTORIS (HCC): Chronic | ICD-10-CM

## 2020-01-15 PROCEDURE — 77030021566

## 2020-01-15 PROCEDURE — 75561 CARDIAC MRI FOR MORPH W/DYE: CPT

## 2020-01-15 PROCEDURE — 74011250636 HC RX REV CODE- 250/636: Performed by: INTERNAL MEDICINE

## 2020-01-15 PROCEDURE — A9577 INJ MULTIHANCE: HCPCS | Performed by: INTERNAL MEDICINE

## 2020-01-15 RX ADMIN — GADOBENATE DIMEGLUMINE 28 ML: 529 INJECTION, SOLUTION INTRAVENOUS at 12:18

## 2020-01-15 NOTE — PROGRESS NOTES
Per chart review pt has been able to resume cardiac rehab. It appears pt is at an appointment at this time for a cardiac MRI.   Will continue to follow

## 2020-01-16 ENCOUNTER — HOSPITAL ENCOUNTER (OUTPATIENT)
Dept: CARDIAC REHAB | Age: 85
Discharge: HOME | End: 2020-01-16
Payer: MEDICARE

## 2020-01-16 VITALS — BODY MASS INDEX: 32.93 KG/M2 | WEIGHT: 223 LBS

## 2020-01-16 PROCEDURE — 93798 PHYS/QHP OP CAR RHAB W/ECG: CPT

## 2020-01-21 ENCOUNTER — HOSPITAL ENCOUNTER (OUTPATIENT)
Dept: CARDIAC REHAB | Age: 85
Discharge: HOME | End: 2020-01-21
Payer: MEDICARE

## 2020-01-21 VITALS — WEIGHT: 222 LBS | BODY MASS INDEX: 32.78 KG/M2

## 2020-01-21 PROCEDURE — 93798 PHYS/QHP OP CAR RHAB W/ECG: CPT

## 2020-01-23 ENCOUNTER — HOSPITAL ENCOUNTER (OUTPATIENT)
Dept: CARDIAC REHAB | Age: 85
Discharge: HOME | End: 2020-01-23
Payer: MEDICARE

## 2020-01-23 VITALS — WEIGHT: 223 LBS | BODY MASS INDEX: 32.93 KG/M2

## 2020-01-23 PROCEDURE — 93798 PHYS/QHP OP CAR RHAB W/ECG: CPT

## 2020-01-27 ENCOUNTER — OFFICE VISIT (OUTPATIENT)
Dept: CARDIOLOGY CLINIC | Age: 85
End: 2020-01-27

## 2020-01-27 VITALS
SYSTOLIC BLOOD PRESSURE: 122 MMHG | BODY MASS INDEX: 32.79 KG/M2 | WEIGHT: 221.4 LBS | DIASTOLIC BLOOD PRESSURE: 74 MMHG | OXYGEN SATURATION: 94 % | HEIGHT: 69 IN | HEART RATE: 66 BPM

## 2020-01-27 DIAGNOSIS — I44.7 LBBB (LEFT BUNDLE BRANCH BLOCK): ICD-10-CM

## 2020-01-27 DIAGNOSIS — E78.00 HIGH CHOLESTEROL: ICD-10-CM

## 2020-01-27 DIAGNOSIS — I48.0 PAF (PAROXYSMAL ATRIAL FIBRILLATION) (HCC): ICD-10-CM

## 2020-01-27 DIAGNOSIS — I10 HTN (HYPERTENSION), BENIGN: Chronic | ICD-10-CM

## 2020-01-27 DIAGNOSIS — I25.10 CORONARY ARTERY DISEASE INVOLVING NATIVE CORONARY ARTERY OF NATIVE HEART WITHOUT ANGINA PECTORIS: Primary | ICD-10-CM

## 2020-01-27 NOTE — LETTER
1/27/20 Patient: Lincoln Harrison YOB: 1935 Date of Visit: 1/27/2020 Solnage Vela, [address] [place] [address] VIA Facsimile: [phone] Dear Solange Vela MD, Thank you for referring Mr. Hudson Sandhu to [address]  for evaluation. My notes for this consultation are attached. If you have questions, please do not hesitate to call me. I look forward to following your patient along with you. Sincerely, Antoni Dubois MD

## 2020-01-27 NOTE — PROGRESS NOTES
Chief Complaint   Patient presents with    Coronary Artery Disease    Cardiomyopathy    Other     PAF     Visit Vitals  /74 (BP 1 Location: Left arm, BP Patient Position: Sitting)   Pulse 66   Ht 5' 9\" (1.753 m)   Wt 221 lb 6.4 oz (100.4 kg)   SpO2 94%   BMI 32.70 kg/m²     Patient in offices states  no cardiac complaints. No hospital stays.     No cardiac refills

## 2020-01-27 NOTE — PROGRESS NOTES
Talisha Jasso MD    [id number] [facility], [address] [facility]    Office [phone],[facility] [phone]  Pager [phone]    Lo Arnold is a 80 y.o. male is here for f/u visit - Afib    Primary care physician:  Joe Hough MD    Patient Active Problem List   Diagnosis Code    Acquired hypothyroidism E03.9    HTN (hypertension), benign I10    Esophageal reflux K21.9    CAD (coronary artery disease) I25.10    Cardiomyopathy ([address] I42.9    GI bleed K92.2    Chronic anticoagulation Z79.01    PAF (paroxysmal atrial fibrillation) ([facility]) I48.0    LBBB (left bundle branch block) I44.7    High cholesterol E78.00    Obesity E66.9    CHF exacerbation ([facility]) I50.9    Elevated troponin R79.89    Chest pain R07.9    SOB (shortness of breath) R06.02    NSTEMI (non-ST elevated myocardial infarction) ([facility] [address] I21.4    Acute respiratory failure with hypoxia ([facility] [address] J96.01       Chief complaint:  Chief Complaint   Patient presents with    Coronary Artery Disease    Cardiomyopathy    Other     PAF       Assessment:    PAF - - (was on amiodaraone - now off it ( office visit 9/18/14)- \"pt not sure - thought he was supposed to come off it\". Was not restarted. He was on Eliquis but had a GI bleed in October 2016 (please see discussion below) and is now on aspirin.)  HTN  LBBB ( old)  Hx of NSVT  Non obstructive CAD by cath 5/2014; Small  Vessel dz/non obstructive CAD 10/2019  Cardiomyopathy -EF 05%/RYXQV 1 diastolic dysfunction. Combination of ischemic/nonischemic.- CMMI - 37% possible sarcoidosis  Chronic dizziness - hx of BPV  \"Cool extremeties- admn 5/5/14 - seen by Dr Kathy Sanchez 5/12/14 - SERAFIN - at ankle (Nml SERAFIN 1.3 bilat with PVR) - neg u/s for DVT and VQ scan\"  (Hx of Anemia w/u 5/5/14 - Dr Curtis Citizen - Mild antral gastritis/ Mild sigmoid diverticulosis and small internal hemorrhoids, otherwise mucosa within normal. - per pt -anemia resolved-recurrent GI bleed October 2016. As per the discussion during the admission, patient did not want any further workup for his diverticulitis history/repeat procedures as part of the gastroenterologist note and it was recommended that because of the 25% risk of rebleeding if Eliquis was restarted, patient would be on aspirin alone.)            Plan:   Cardiomyopathy/concern for amyoidosis on echo but CMRI - possible sarcoidosis ; no evidence of amyloidosis - EF 37%  On ASA/ B Blocker/ARB /statin-not on ACE inhibitor  Will refer to pulmonologist for further evaluation of possible pulmonary sarcoidosis. Tolerating Norvasc 5mg daily. Daily wts  Aggressive CV risk factor modification  Lipids perPCP  F/u appt in 3 mths/prn        I appreciate the opportunity to be involved in Mr. Ríos. See note below for details. Please do not hesitate to contact us with questions or concerns. ATTENTION: f  This medical record was transcribed using an electronic medical records/speech recognition system. Although proofread, it may and can contain electronic, spelling and other errors. Corrections may be executed at a later time. Please feel free to contact us for any clarifications as needed. Maryana Connors MD    Cardiac Testing/ Procedures: A. Cardiac Cath/PCI:10/23/19 R CFA access - ultrasound/fluoroscopic/micropuncture access     L Main: Large; Nml     LAD: Ca++; Prox - large; Mid/distal - small to med; Mid LAD at D2 - 60%; D1 - Ca++- prox 30%;  D2 - Med; MLI     LCflex: Prox - Med to large ; Mid - med 40%; Small OM1/OM2; Med OM3/OM4; Inf branch of OM4 - 70%- small vessel     RCA: Dominant; Large; Mid 40%; PDA and PLB - med - MLI     LVEDP: elevated 25     iFR - LAD - 0.91 not signifcant  IVUS LAD - significant ( >270 degrees ) calcium     No significant gradient across aortic valve.        Specimens Removed : None     Complications: None   9/8/89  L Main: MLI  LAD: Med to Large prox ; Mid and distal LAD - small to med;  At D1 - tandem  50% lesions; D1 - small to med - MLI  LCflex: Large; MLI; OM1;OM2 - MLI  RCA: Subselective inj ( WRP catheter) - tortuous; MLI  LVEDP: 10  LVEF: 40%  No significant gradient across aortic valve. RHC findings:  RAP= 2 mmHg  RVSP= 31 mmHg  PAP= 15 mmHg  PCWP= 6 mmHg  CO= 5.61 l/min  CI=2.64  Closure Device: Manual          B.ECHO/KIRT: 10/22/19 -EF 40%. Grade 1 diastolic dysfunction. Dilated LA. Moderate aortic valve sclerosis without stenosis. Mild MR. Mild tricuspid regurgitation. ?  Cardiac amyloidosis pattern recommend further evaluation    1/11/19-EF 37-22%, grade 1 diastolic dysfunction, mildly dilated LAD, PAS P 34 mmHg    12/8/15 Left ventricle: Systolic function was normal by EF (biplane method of  disks). Ejection fraction was estimated to be 50 %. There were no regional  wall motion abnormalities. Doppler parameters were consistent with  abnormal left ventricular relaxation (grade 1 diastolic dysfunction). Left atrium: The atrium was moderately dilated. Mitral valve: There was mild annular calcification. There was mild  regurgitation. Aortic valve: Normal valve structure. Leaflets exhibited mild  calcification and sclerosis. Tricuspid valve: There was moderate regurgitation. 9/12/14 Left ventricle: Systolic function was mildly reduced. Ejection fraction  was estimated to be 50 %. There was moderate diffuse hypokinesis. There  was severe hypokinesis of the basal-mid inferior wall(s). Doppler  parameters were consistent with abnormal left ventricular relaxation  (grade 1 diastolic dysfunction). Left atrium: The atrium was moderately to markedly dilated. Right atrium: The atrium was dilated. Mitral valve: There was mild annular calcification. There was mild  regurgitation. Tricuspid valve: There was mild regurgitation. 5/6/14 Left ventricle: Size was normal. Systolic function was markedly reduced. Ejection fraction was estimated to be 25 %.  There was severe diffuse  hypokinesis with distinct regional wall motion abnormalities. Doppler  parameters were consistent with abnormal left ventricular relaxation  (grade 1 diastolic dysfunction). Right ventricle: The size was normal. Systolic function was normal.    Left atrium: The atrium was mildly dilated. Mitral valve: There was moderate annular calcification. There was mild  regurgitation. Regurgitation grade was 1+ on a scale of 0 to 4+. C.StressNuclear/Stress ECHO/Stress test: 1/11/19-normal Nila nuclear study. EF 43%    D. Vascular:    E. EP:    F. Miscellaneous: CMRI 1/15/2020 IMPRESSION       1. Normal left ventricular cavity size with moderate left ventricular systolic  dysfunction. Abnormal septal motion consistent with intraventricular conduction  delay. Mild global hypokinesis is present. 3-D LVEF 37%. 2. Normal right ventricular size and systolic function. RVEF 50%. 3. Mildly sclerotic aortic valve leaflet with minimal aortic stenosis. Aortic  valve area is 1.9 cm with by planimetry. 4. On EGE and LGE study, there is patchy areas of myocardial enhancement  particularly affecting the mid wall of the basal septum, base to mid inferior RV  insertion point, base to mid anterolateral wall sparing the subendocardium. The  differential diagnoses include possibility of infiltrative cardiac sarcoidosis  versus nonspecific myocardial scarring from nonischemic cardiomyopathy. No  features suggestive of cardiac amyloidosis. No features of recent or old  myocardial infarction. No features suggestive of viral myocarditis. 5. Small paratracheal and azygous esophageal lymph nodes are present. Further  dedicated evaluation of these lymph nodes is recommended given concern for  possible cardiac sarcoidosis. 6. Normal pleura and pericardium. There is no significant effusions. Subjective:  Rema Foster is a 80 y.o. male who returns for follow up visit. No chest pain, dyspnea. No swelling lower extremities.   .    (GI bleed in October 2016 when his Eliquis was stopped. As per the discussion during the admission, patient did not want any further workup for his diverticulitis history/repeat procedures as part of the gastroenterologist note and it was recommended that because of the 25% risk of rebleeding if Eliquis was restarted, patient would be on aspirin alone. He is at [facility] and is in a cottage. His son owns a [employer] dealerships  and checks on him. Patient was  for 60+ years and  lost his wife in 2015.)        (Richrd Schirmer is a 66 y.o. male with a hx of htn, dyslipidemia, hypothyroidism, Gout and prostate cancer s/p prostatectomy admitted for worsened lethargy and dizziness to [facility] on 5/5/14. Taye Maza Hx of  afib with RVR the pt was started on diltiazem but is remaining in the 120-130 range. EKG showed a LBBB in addition to afib. Stress test with Myoview on 1/2013 showed an EF of 62% with no evidence of ischemia. Hx of prior LBBB. Hx of chronic dizziness - BPV. (not assoc with afib). ECHO showed EF of 40% (  clicnically well compensated) - underwent RHC/LHC - non obstructive CAD. Also had  NSVT. Seen by  [address] [facility]. Started on Amiodarone and Eliquis. During office visit 9/2014 - was not taking amiodarone. Was in sinus erica and it was not restarted.)                   ROS:  (bold if positive, if negative)             Medications before admission:      Current Outpatient Medications:     losartan (COZAAR) 100 mg tablet, TAKE ONE TABLET BY MOUTH DAILY, Disp: 30 Tab, Rfl: 2    amLODIPine (NORVASC) 5 mg tablet, Take 1 Tab by mouth daily. , Disp: 30 Tab, Rfl: 3    guaiFENesin (ORGANIDIN) 400 mg tablet, Take 400 mg by mouth daily. Indications: CONGESTION, Disp: , Rfl:     furosemide (LASIX) 20 mg tablet, Take 1 Tab by mouth as needed (swelling, weight gain, shortness of breath). , Disp: 30 Tab, Rfl: 1    memantine (NAMENDA) 10 mg tablet, Take 10 mg by mouth two (2) times a day., Disp: , Rfl:     nortriptyline (PAMELOR) 10 mg capsule, Take 20 mg by mouth nightly. Indications: SLEEP & HEADACHE PREVENTION, Disp: , Rfl:     artificial tears, dextran 70-hypromellose, (NATURAL BALANCE TEARS) 0.1-0.3 % ophthalmic solution, Administer 1 Drop to both eyes as needed (dry eyes). , Disp: , Rfl:     diphenhydrAMINE-acetaminophen (TYLENOL PM EXTRA STRENGTH)  mg tab, Take 2 Tabs by mouth nightly as needed (sleep). , Disp: , Rfl:     famotidine (PEPCID) 20 mg tablet, Take 20 mg by mouth two (2) times a day., Disp: , Rfl:     CALCIUM CARBONATE (CALCIUM 500 PO), Take 1 Tab by mouth daily. , Disp: , Rfl:     levothyroxine (SYNTHROID) 75 mcg tablet, Take 75 mcg by mouth Daily (before breakfast). , Disp: , Rfl:     carvedilol (COREG) 25 mg tablet, TAKE ONE TABLET BY MOUTH TWICE A DAY WITH MEALS, Disp: 60 Tab, Rfl: 1    loratadine (CLARITIN) 10 mg tablet, Take 10 mg by mouth daily. , Disp: , Rfl:     polyethylene glycol (MIRALAX) 17 gram packet, Take 17 g by mouth daily. , Disp: , Rfl:     psyllium (METAMUCIL) packet, Take 1 Packet by mouth daily. , Disp: , Rfl:     simvastatin (ZOCOR) 20 mg tablet, Take 20 mg by mouth nightly., Disp: , Rfl:     aspirin 81 mg tablet, Take 81 mg by mouth nightly., Disp: , Rfl:     MULTIVITAMIN PO, Take 1 Tab by mouth daily. , Disp: , Rfl:         Physical Exam:  Visit Vitals  /74 (BP 1 Location: Left arm, BP Patient Position: Sitting)   Pulse 66   Ht 5' 9\" (1.753 m)   Wt 221 lb 6.4 oz (100.4 kg)   SpO2 94%   BMI 32.70 kg/m²          Gen: Well-developed, well-nourished, in no acute distress  Neck: Supple,No JVD, No Carotid Bruit,   Resp: No accessory muscle use, Dec AE Bilat, No rales or rhonchi  Card: Regular Rate,Rythm,Normal S1, S2, No murmurs, rubs or gallop.    Abd:  Soft, non-tender, non-distended,BS+,   MSK: No cyanosis  Neuro: moving all four extremities , follows commands appropriately  Psych:  Good insight, oriented to person, place , alert, Nml Affect  LE: No edema    EKG:      LABS:        Lab Results   Component Value Date/Time    WBC 11.5 (H) 10/24/2019 02:11 AM    HGB 14.6 10/24/2019 02:11 AM    HCT 42.9 10/24/2019 02:11 AM    PLATELET 819 06/42/8293 02:11 AM    MCV 89.4 10/24/2019 02:11 AM     Lab Results   Component Value Date/Time    Sodium 139 10/29/2019 11:19 AM    Potassium 4.2 10/29/2019 11:19 AM    Chloride 99 10/29/2019 11:19 AM    CO2 26 10/29/2019 11:19 AM    Anion gap 7 10/24/2019 02:11 AM    Glucose 125 (H) 10/29/2019 11:19 AM    BUN 12 10/29/2019 11:19 AM    Creatinine 1.34 (H) 10/29/2019 11:19 AM    BUN/Creatinine ratio 9 (L) 10/29/2019 11:19 AM    GFR est AA 56 (L) 10/29/2019 11:19 AM    GFR est non-AA 48 (L) 10/29/2019 11:19 AM    Calcium 9.9 10/29/2019 11:19 AM       Lab Results   Component Value Date/Time    aPTT 57.0 (H) 10/23/2019 05:10 AM     Lab Results   Component Value Date/Time    INR 1.1 10/02/2016 09:16 AM    INR 1.0 05/05/2014 10:30 AM    Prothrombin time 10.8 10/02/2016 09:16 AM    Prothrombin time 10.5 05/05/2014 10:30 AM     No components found for: Nicole Ricci MD

## 2020-01-27 NOTE — PATIENT INSTRUCTIONS
Sarcoidosis: Care Instructions  Your Care Instructions    Sarcoidosis (say \"dwy-xbk-LFN-lázaro\") is a rare disease that causes tiny lumps of cells throughout the body called granulomas. These lumps are too small to see or feel. They can form anywhere on the inside or outside of the body and can cause permanent scar tissue. They often form in the lungs, lymph nodes, liver, skin, or eyes. Sarcoidosis may affect how an organ works. For instance, if it is in the lungs, you may be short of breath. For most people, sarcoidosis is a long-term disease that lasts several years or a lifetime. But some cases go away in a few months. Experts have no way of knowing how it will affect you. For some people, the disease may cause no symptoms at all. For others, symptoms may include fever, body aches, swollen lymph glands, shortness of breath, painful joints, and numbness. It may lead to lung or heart problems. Sometimes sarcoidosis can cause high calcium levels in the blood. Sarcoidosis occurs most often in young and middle-aged adults. Although the cause is not known, the disease does not spread from person to person. Different types of sarcoidosis have different treatments. Sarcoidosis may require long-term treatment (lasting months to years) with corticosteroids and other medicines, especially if it causes symptoms. You may also need regular tests. Follow-up care is a key part of your treatment and safety. Be sure to make and go to all appointments, and call your doctor if you are having problems. It's also a good idea to know your test results and keep a list of the medicines you take. How can you care for yourself at home? · Take your medicines exactly as prescribed. Call your doctor if you think you are having a problem with your medicine. · Do not smoke. Smoking can make sarcoidosis worse. If you need help quitting, talk to your doctor about stop-smoking programs and medicines.  These can increase your chances of quitting for good. · Avoid dust, smoke, and fumes. They can harm your lungs. · Drink plenty of fluids, enough so that your urine is light yellow or clear like water. If you have kidney, heart, or liver disease and have to limit fluids, talk with your doctor before you increase the amount of fluids you drink. · If your doctor recommends it, get more exercise. Walking is a good choice. Bit by bit, increase the amount you walk every day. Try for at least 30 minutes on most days of the week. You also may want to swim, bike, or do other activities. When should you call for help? Call 911 anytime you think you may need emergency care. For example, call if:    · You have severe trouble breathing.     · You passed out (lost consciousness).    Call your doctor now or seek immediate medical care if:    · You have changes in your vision.     · You are very tired, get confused, or urinate a lot.     · Your symptoms do not get better, or they get worse.    Watch closely for changes in your health, and be sure to contact your doctor if you have any problems. Where can you learn more? Go to http://lizzette-adolph.info/. Enter [phone] 78 in the search box to learn more about \"Sarcoidosis: Care Instructions. \"  Current as of: June 9, 2019  Content Version: 12.2  © 9502-6773 [employer]. Care instructions adapted under license by [employer] (which disclaims liability or warranty for this information). If you have questions about a medical condition or this instruction, always ask your healthcare professional. [place] [address] any warranty or liability for your use of this information.

## 2020-01-28 ENCOUNTER — HOSPITAL ENCOUNTER (OUTPATIENT)
Dept: CARDIAC REHAB | Age: 85
Discharge: HOME | End: 2020-01-28
Payer: MEDICARE

## 2020-01-28 VITALS — BODY MASS INDEX: 32.78 KG/M2 | WEIGHT: 222 LBS

## 2020-01-28 PROCEDURE — 93798 PHYS/QHP OP CAR RHAB W/ECG: CPT

## 2020-01-30 ENCOUNTER — HOSPITAL ENCOUNTER (OUTPATIENT)
Dept: CARDIAC REHAB | Age: 85
Discharge: HOME | End: 2020-01-30
Payer: MEDICARE

## 2020-01-30 VITALS — BODY MASS INDEX: 32.64 KG/M2 | WEIGHT: 221 LBS

## 2020-01-30 PROCEDURE — 93798 PHYS/QHP OP CAR RHAB W/ECG: CPT

## 2020-02-04 ENCOUNTER — HOSPITAL ENCOUNTER (OUTPATIENT)
Dept: CARDIAC REHAB | Age: 85
Discharge: HOME | End: 2020-02-04
Payer: MEDICARE

## 2020-02-04 ENCOUNTER — TELEPHONE (OUTPATIENT)
Dept: CARDIOLOGY CLINIC | Age: 85
End: 2020-02-04

## 2020-02-04 VITALS — WEIGHT: 224 LBS | BODY MASS INDEX: 33.08 KG/M2

## 2020-02-04 DIAGNOSIS — I25.10 CORONARY ARTERY DISEASE INVOLVING NATIVE CORONARY ARTERY OF NATIVE HEART WITHOUT ANGINA PECTORIS: Primary | Chronic | ICD-10-CM

## 2020-02-04 PROCEDURE — 93798 PHYS/QHP OP CAR RHAB W/ECG: CPT

## 2020-02-06 ENCOUNTER — HOSPITAL ENCOUNTER (OUTPATIENT)
Dept: CARDIAC REHAB | Age: 85
Discharge: HOME | End: 2020-02-06
Payer: MEDICARE

## 2020-02-06 VITALS — BODY MASS INDEX: 32.78 KG/M2 | WEIGHT: 222 LBS

## 2020-02-06 PROCEDURE — 93798 PHYS/QHP OP CAR RHAB W/ECG: CPT

## 2020-02-11 ENCOUNTER — HOSPITAL ENCOUNTER (OUTPATIENT)
Dept: CARDIAC REHAB | Age: 85
Discharge: HOME | End: 2020-02-11
Payer: MEDICARE

## 2020-02-11 VITALS — WEIGHT: 225 LBS | BODY MASS INDEX: 33.23 KG/M2

## 2020-02-11 PROCEDURE — 93798 PHYS/QHP OP CAR RHAB W/ECG: CPT

## 2020-02-13 ENCOUNTER — HOSPITAL ENCOUNTER (OUTPATIENT)
Dept: CARDIAC REHAB | Age: 85
Discharge: HOME | End: 2020-02-13
Payer: MEDICARE

## 2020-02-13 VITALS — BODY MASS INDEX: 33.23 KG/M2 | WEIGHT: 225 LBS

## 2020-02-13 PROCEDURE — 93798 PHYS/QHP OP CAR RHAB W/ECG: CPT

## 2020-02-18 ENCOUNTER — HOSPITAL ENCOUNTER (OUTPATIENT)
Dept: CARDIAC REHAB | Age: 85
Discharge: HOME | End: 2020-02-18
Payer: MEDICARE

## 2020-02-18 VITALS — BODY MASS INDEX: 33.52 KG/M2 | WEIGHT: 227 LBS

## 2020-02-18 PROCEDURE — 93798 PHYS/QHP OP CAR RHAB W/ECG: CPT

## 2020-02-20 ENCOUNTER — APPOINTMENT (OUTPATIENT)
Dept: CARDIAC REHAB | Age: 85
End: 2020-02-20
Payer: MEDICARE

## 2020-02-21 RX ORDER — AMLODIPINE BESYLATE 5 MG/1
5 TABLET ORAL DAILY
Qty: 30 TAB | Refills: 3 | Status: SHIPPED | OUTPATIENT
Start: 2020-02-21 | End: 2020-06-03

## 2020-02-21 NOTE — TELEPHONE ENCOUNTER
LOV 1/27/2020  NOV 4/27/2020    Medication refill per VO per Dr. Oskar Mike    Requested Prescriptions     Pending Prescriptions Disp Refills    amLODIPine (NORVASC) 5 mg tablet 30 Tab 3     Sig: Take 1 Tab by mouth daily.

## 2020-02-25 ENCOUNTER — HOSPITAL ENCOUNTER (OUTPATIENT)
Dept: CARDIAC REHAB | Age: 85
Discharge: HOME | End: 2020-02-25
Payer: MEDICARE

## 2020-02-25 VITALS — WEIGHT: 225 LBS | BODY MASS INDEX: 33.23 KG/M2

## 2020-02-25 PROCEDURE — 93798 PHYS/QHP OP CAR RHAB W/ECG: CPT

## 2020-02-27 ENCOUNTER — HOSPITAL ENCOUNTER (OUTPATIENT)
Dept: CARDIAC REHAB | Age: 85
Discharge: HOME | End: 2020-02-27
Payer: MEDICARE

## 2020-02-27 VITALS — WEIGHT: 220 LBS | BODY MASS INDEX: 32.49 KG/M2

## 2020-02-27 PROCEDURE — 93798 PHYS/QHP OP CAR RHAB W/ECG: CPT

## 2020-03-03 ENCOUNTER — APPOINTMENT (OUTPATIENT)
Dept: CARDIAC REHAB | Age: 85
End: 2020-03-03

## 2020-03-05 ENCOUNTER — APPOINTMENT (OUTPATIENT)
Dept: CARDIAC REHAB | Age: 85
End: 2020-03-05

## 2020-03-10 ENCOUNTER — APPOINTMENT (OUTPATIENT)
Dept: CARDIAC REHAB | Age: 85
End: 2020-03-10

## 2020-03-12 ENCOUNTER — APPOINTMENT (OUTPATIENT)
Dept: CARDIAC REHAB | Age: 85
End: 2020-03-12

## 2020-03-17 ENCOUNTER — APPOINTMENT (OUTPATIENT)
Dept: CARDIAC REHAB | Age: 85
End: 2020-03-17

## 2020-03-19 ENCOUNTER — APPOINTMENT (OUTPATIENT)
Dept: CARDIAC REHAB | Age: 85
End: 2020-03-19

## 2020-03-24 ENCOUNTER — APPOINTMENT (OUTPATIENT)
Dept: CARDIAC REHAB | Age: 85
End: 2020-03-24

## 2020-03-26 ENCOUNTER — APPOINTMENT (OUTPATIENT)
Dept: CARDIAC REHAB | Age: 85
End: 2020-03-26

## 2020-03-31 ENCOUNTER — APPOINTMENT (OUTPATIENT)
Dept: CARDIAC REHAB | Age: 85
End: 2020-03-31

## 2020-04-02 ENCOUNTER — APPOINTMENT (OUTPATIENT)
Dept: CARDIAC REHAB | Age: 85
End: 2020-04-02

## 2020-04-16 ENCOUNTER — TELEPHONE (OUTPATIENT)
Dept: CARDIOLOGY CLINIC | Age: 85
End: 2020-04-16

## 2020-04-17 ENCOUNTER — TELEPHONE (OUTPATIENT)
Dept: CARDIOLOGY CLINIC | Age: 85
End: 2020-04-17

## 2020-04-27 ENCOUNTER — VIRTUAL VISIT (OUTPATIENT)
Dept: CARDIOLOGY CLINIC | Age: 85
End: 2020-04-27

## 2020-04-27 DIAGNOSIS — I44.7 LBBB (LEFT BUNDLE BRANCH BLOCK): ICD-10-CM

## 2020-04-27 DIAGNOSIS — I10 HTN (HYPERTENSION), BENIGN: ICD-10-CM

## 2020-04-27 DIAGNOSIS — I42.9 CARDIOMYOPATHY, UNSPECIFIED TYPE (HCC): ICD-10-CM

## 2020-04-27 DIAGNOSIS — E78.5 DYSLIPIDEMIA: ICD-10-CM

## 2020-04-27 DIAGNOSIS — I48.0 PAF (PAROXYSMAL ATRIAL FIBRILLATION) (HCC): ICD-10-CM

## 2020-04-27 DIAGNOSIS — I25.10 CORONARY ARTERY DISEASE INVOLVING NATIVE CORONARY ARTERY OF NATIVE HEART WITHOUT ANGINA PECTORIS: Primary | ICD-10-CM

## 2020-04-27 NOTE — PROGRESS NOTES
Jael Mccoy MD    [address] [facility] [place], [address] [facility]    Office [phone],XOK [phone]  Pager [phone] to the emergency declaration under the [address] [place] [address] waiver authority and the [employer] and [employer] Act, this Telephone Visit was conducted, with patient's consent, to reduce the patient's risk of exposure to COVID-19 and provide continuity of care for an established patient. He and/or health care decision maker is aware that that he may receive a bill for this telephone service, depending on his insurance coverage, and has provided verbal consent to proceed. This is a Patient Initiated Episode with an Established Patient who has not had a related appointment within my department in the past 7 days or scheduled within the next 24 hours. Tiffanie Renee is a 80 y.o. male VV    Primary care physician:  Rad Bella MD    Patient Active Problem List   Diagnosis Code    Acquired hypothyroidism E03.9    HTN (hypertension), benign I10    Esophageal reflux K21.9    CAD (coronary artery disease) I25.10    Cardiomyopathy ([address] I42.9    GI bleed K92.2    Chronic anticoagulation Z79.01    PAF (paroxysmal atrial fibrillation) (HCC) I48.0    LBBB (left bundle branch block) I44.7    High cholesterol E78.00    Obesity E66.9    CHF exacerbation ([facility]) I50.9    Elevated troponin R79.89    Chest pain R07.9    SOB (shortness of breath) R06.02    NSTEMI (non-ST elevated myocardial infarction) ([address] I21.4    Acute respiratory failure with hypoxia ([address] J96.01       Chief complaint:  No chief complaint on file. Assessment:    PAF - - (was on amiodaraone - now off it ( office visit 9/18/14)- \"pt not sure - thought he was supposed to come off it\". Was not restarted.   He was on Eliquis but had a GI bleed in October 2016 (please see discussion below) and is now on aspirin.)  HTN  LBBB ( old)  Hx of NSVT  Non obstructive CAD by cath 5/2014; Small  Vessel dz/non obstructive CAD 10/2019  Cardiomyopathy -EF 88%/VQDFO 1 diastolic dysfunction. Combination of ischemic/nonischemic.- CMMI - 37% possible sarcoidosis  Chronic dizziness - hx of BPV  (\"Cool extremeties- admn 5/5/14 - seen by Dr Sayra Blancas 5/12/14 - SERAFIN - at ankle (Nml SERAFIN 1.3 bilat with PVR) - neg u/s for DVT and VQ scan\")e  (Hx of Anemia w/u 5/5/14 - Dr Kevin Fernández - Mild antral gastritis/ Mild sigmoid diverticulosis and small internal hemorrhoids, otherwise mucosa within normal. - per pt -anemia resolved-recurrent GI bleed October 2016. As per the discussion during the admission, patient did not want any further workup for his diverticulitis history/repeat procedures as part of the gastroenterologist note and it was recommended that because of the 25% risk of rebleeding if Eliquis was restarted, patient would be on aspirin alone.)            Plan:   Cardiomyopathy/concern for amyoidosis on echo but CMRI - possible sarcoidosis versus scarring; no evidence of amyloidosis - EF 37%  On ASA/ B Blocker/ARB /statin-not on ACE inhibitor. Tolerating Norvasc  States that he needs get an operation for his eye. To scheduled this month but because of COVID was rescheduled. Daily wts  Aggressive CV risk factor modification  Lipids perPCP. Patient states that he has been evaluated per pulmonary and was told that he did not have any issues from a pulmonary standpoint. F/u appt in 3 mths/prn        I appreciate the opportunity to be involved in Mr. Ríos. See note below for details. Please do not hesitate to contact us with questions or concerns. Patient was made aware and verbalized understanding that an appointment will be scheduled for them for a virtual visit/tel visit and/or office visit within the above time frame.  Patient understanding his/her responsibility to call and change time/date if he/she so chooses. .     Greater than 20 minutes was spent in direct telephonic patient care, planning and chart review. ATTENTION: f  This medical record was transcribed using an electronic medical records/speech recognition system. Although proofread, it may and can contain electronic, spelling and other errors. Corrections may be executed at a later time. Please feel free to contact us for any clarifications as needed. Sarahi العراقي MD    Cardiac Testing/ Procedures: A. Cardiac Cath/PCI:10/23/19 R CFA access - ultrasound/fluoroscopic/micropuncture access     L Main: Large; Nml     LAD: Ca++; Prox - large; Mid/distal - small to med; Mid LAD at D2 - 60%; D1 - Ca++- prox 30%;  D2 - Med; MLI     LCflex: Prox - Med to large ; Mid - med 40%; Small OM1/OM2; Med OM3/OM4; Inf branch of OM4 - 70%- small vessel     RCA: Dominant; Large; Mid 40%; PDA and PLB - med - MLI     LVEDP: elevated 25     iFR - LAD - 0.91 not signifcant  IVUS LAD - significant ( >270 degrees ) calcium     No significant gradient across aortic valve.        Specimens Removed : None     Complications: None   7/8/11  L Main: MLI  LAD: Med to Large prox ; Mid and distal LAD - small to med; At D1 - tandem  50% lesions; D1 - small to med - MLI  LCflex: Large; MLI; OM1;OM2 - MLI  RCA: Subselective inj ( WRP catheter) - tortuous; MLI  LVEDP: 10  LVEF: 40%  No significant gradient across aortic valve. RHC findings:  RAP= 2 mmHg  RVSP= 31 mmHg  PAP= 15 mmHg  PCWP= 6 mmHg  CO= 5.61 l/min  CI=2.64  Closure Device: Manual          B.ECHO/KIRT: 10/22/19 -EF 40%. Grade 1 diastolic dysfunction. Dilated LA. Moderate aortic valve sclerosis without stenosis. Mild MR. Mild tricuspid regurgitation. ?  Cardiac amyloidosis pattern recommend further evaluation    1/11/19-EF 36-53%, grade 1 diastolic dysfunction, mildly dilated LAD, PAS P 34 mmHg    12/8/15 Left ventricle: Systolic function was normal by EF (biplane method of  disks).  Ejection fraction was estimated to be 50 %. There were no regional  wall motion abnormalities. Doppler parameters were consistent with  abnormal left ventricular relaxation (grade 1 diastolic dysfunction). Left atrium: The atrium was moderately dilated. Mitral valve: There was mild annular calcification. There was mild  regurgitation. Aortic valve: Normal valve structure. Leaflets exhibited mild  calcification and sclerosis. Tricuspid valve: There was moderate regurgitation. 9/12/14 Left ventricle: Systolic function was mildly reduced. Ejection fraction  was estimated to be 50 %. There was moderate diffuse hypokinesis. There  was severe hypokinesis of the basal-mid inferior wall(s). Doppler  parameters were consistent with abnormal left ventricular relaxation  (grade 1 diastolic dysfunction). Left atrium: The atrium was moderately to markedly dilated. Right atrium: The atrium was dilated. Mitral valve: There was mild annular calcification. There was mild  regurgitation. Tricuspid valve: There was mild regurgitation. 5/6/14 Left ventricle: Size was normal. Systolic function was markedly reduced. Ejection fraction was estimated to be 25 %. There was severe diffuse  hypokinesis with distinct regional wall motion abnormalities. Doppler  parameters were consistent with abnormal left ventricular relaxation  (grade 1 diastolic dysfunction). Right ventricle: The size was normal. Systolic function was normal.    Left atrium: The atrium was mildly dilated. Mitral valve: There was moderate annular calcification. There was mild  regurgitation. Regurgitation grade was 1+ on a scale of 0 to 4+. C.StressNuclear/Stress ECHO/Stress test: 1/11/19-normal Nila nuclear study. EF 43%    D. Vascular:    E. EP:    F. Miscellaneous: CMRI 1/15/2020 IMPRESSION       1. Normal left ventricular cavity size with moderate left ventricular systolic  dysfunction.  Abnormal septal motion consistent with intraventricular conduction  delay. Mild global hypokinesis is present. 3-D LVEF 37%. 2. Normal right ventricular size and systolic function. RVEF 50%. 3. Mildly sclerotic aortic valve leaflet with minimal aortic stenosis. Aortic  valve area is 1.9 cm with by planimetry. 4. On EGE and LGE study, there is patchy areas of myocardial enhancement  particularly affecting the mid wall of the basal septum, base to mid inferior RV  insertion point, base to mid anterolateral wall sparing the subendocardium. The  differential diagnoses include possibility of infiltrative cardiac sarcoidosis  versus nonspecific myocardial scarring from nonischemic cardiomyopathy. No  features suggestive of cardiac amyloidosis. No features of recent or old  myocardial infarction. No features suggestive of viral myocarditis. 5. Small paratracheal and azygous esophageal lymph nodes are present. Further  dedicated evaluation of these lymph nodes is recommended given concern for  possible cardiac sarcoidosis. 6. Normal pleura and pericardium. There is no significant effusions. Subjective:  Anila Pereira is a 80 y.o. male who returns for follow up visit. No chest pain, dyspnea. No swelling lower extremities. .    States that weight has been steady. Has not had to use any Lasix. Blood pressure today-125/78    (GI bleed in October 2016 when his Eliquis was stopped. As per the discussion during the admission, patient did not want any further workup for his diverticulitis history/repeat procedures as part of the gastroenterologist note and it was recommended that because of the 25% risk of rebleeding if Eliquis was restarted, patient would be on aspirin alone. He is at [facility] and is in a cottage. His son owns a [employer]s  and checks on him.   Patient was  for 60+ years and  lost his wife in 2015.)        (Anila Pereira is a 66 y.o. male with a hx of htn, dyslipidemia, hypothyroidism, Gout and prostate cancer s/p prostatectomy admitted for worsened lethargy and dizziness to [facility] on 5/5/14. Giselle Combs Hx of  afib with RVR the pt was started on diltiazem but is remaining in the 120-130 range. EKG showed a LBBB in addition to afib. Stress test with Myoview on 1/2013 showed an EF of 62% with no evidence of ischemia. Hx of prior LBBB. Hx of chronic dizziness - BPV. (not assoc with afib). ECHO showed EF of 96% (  clicnically well compensated) - underwent RHC/LHC - non obstructive CAD. Also had  NSVT. Seen by  [facility]. Started on Amiodarone and Eliquis. During office visit 9/2014 - was not taking amiodarone. Was in sinus erica and it was not restarted.)                   ROS:  (bold if positive, if negative)             Medications before admission:      Current Outpatient Medications:     amLODIPine (NORVASC) 5 mg tablet, Take 1 Tab by mouth daily. , Disp: 30 Tab, Rfl: 3    losartan (COZAAR) 100 mg tablet, TAKE ONE TABLET BY MOUTH DAILY, Disp: 30 Tab, Rfl: 2    furosemide (LASIX) 20 mg tablet, Take 1 Tab by mouth as needed (swelling, weight gain, shortness of breath). , Disp: 30 Tab, Rfl: 1    memantine (NAMENDA) 10 mg tablet, Take 10 mg by mouth two (2) times a day., Disp: , Rfl:     nortriptyline (PAMELOR) 10 mg capsule, Take 20 mg by mouth nightly. Indications: SLEEP & HEADACHE PREVENTION, Disp: , Rfl:     artificial tears, dextran 70-hypromellose, (NATURAL BALANCE TEARS) 0.1-0.3 % ophthalmic solution, Administer 1 Drop to both eyes as needed (dry eyes). , Disp: , Rfl:     diphenhydrAMINE-acetaminophen (TYLENOL PM EXTRA STRENGTH)  mg tab, Take 2 Tabs by mouth nightly as needed (sleep). , Disp: , Rfl:     famotidine (PEPCID) 20 mg tablet, Take 20 mg by mouth two (2) times a day., Disp: , Rfl:     CALCIUM CARBONATE (CALCIUM 500 PO), Take 1 Tab by mouth daily. , Disp: , Rfl:     levothyroxine (SYNTHROID) 75 mcg tablet, Take 75 mcg by mouth Daily (before breakfast). , Disp: , Rfl:     carvedilol (COREG) 25 mg tablet, TAKE ONE TABLET BY MOUTH TWICE A DAY WITH MEALS, Disp: 60 Tab, Rfl: 1    loratadine (CLARITIN) 10 mg tablet, Take 10 mg by mouth daily. , Disp: , Rfl:     polyethylene glycol (MIRALAX) 17 gram packet, Take 17 g by mouth daily. , Disp: , Rfl:     psyllium (METAMUCIL) packet, Take 1 Packet by mouth daily. , Disp: , Rfl:     simvastatin (ZOCOR) 20 mg tablet, Take 20 mg by mouth nightly., Disp: , Rfl:     aspirin 81 mg tablet, Take 81 mg by mouth nightly., Disp: , Rfl:     MULTIVITAMIN PO, Take 1 Tab by mouth daily. , Disp: , Rfl:     guaiFENesin (ORGANIDIN) 400 mg tablet, Take 400 mg by mouth daily. Indications: CONGESTION, Disp: , Rfl:         Physical Exam:  There were no vitals taken for this visit.            EKG:      LABS:        Lab Results   Component Value Date/Time    WBC 11.5 (H) 10/24/2019 02:11 AM    HGB 14.6 10/24/2019 02:11 AM    HCT 42.9 10/24/2019 02:11 AM    PLATELET 342 84/49/8205 02:11 AM    MCV 89.4 10/24/2019 02:11 AM     Lab Results   Component Value Date/Time    Sodium 139 10/29/2019 11:19 AM    Potassium 4.2 10/29/2019 11:19 AM    Chloride 99 10/29/2019 11:19 AM    CO2 26 10/29/2019 11:19 AM    Anion gap 7 10/24/2019 02:11 AM    Glucose 125 (H) 10/29/2019 11:19 AM    BUN 12 10/29/2019 11:19 AM    Creatinine 1.34 (H) 10/29/2019 11:19 AM    BUN/Creatinine ratio 9 (L) 10/29/2019 11:19 AM    GFR est AA 56 (L) 10/29/2019 11:19 AM    GFR est non-AA 48 (L) 10/29/2019 11:19 AM    Calcium 9.9 10/29/2019 11:19 AM       Lab Results   Component Value Date/Time    aPTT 57.0 (H) 10/23/2019 05:10 AM     Lab Results   Component Value Date/Time    INR 1.1 10/02/2016 09:16 AM    INR 1.0 05/05/2014 10:30 AM    Prothrombin time 10.8 10/02/2016 09:16 AM    Prothrombin time 10.5 05/05/2014 10:30 AM     No components found for: Stew Irby MD

## 2020-06-03 RX ORDER — AMLODIPINE BESYLATE 5 MG/1
TABLET ORAL
Qty: 90 TAB | Refills: 2 | Status: SHIPPED | OUTPATIENT
Start: 2020-06-03 | End: 2021-02-25

## 2020-07-16 ENCOUNTER — HOSPITAL ENCOUNTER (OUTPATIENT)
Dept: CT IMAGING | Age: 85
Discharge: HOME | End: 2020-07-16
Attending: FAMILY MEDICINE
Payer: MEDICARE

## 2020-07-16 DIAGNOSIS — R10.9 STOMACH ACHE: ICD-10-CM

## 2020-07-16 DIAGNOSIS — R05.9 COUGH: ICD-10-CM

## 2020-07-16 LAB — CREAT BLD-MCNC: 1.1 MG/DL (ref 0.6–1.3)

## 2020-07-16 PROCEDURE — 82565 ASSAY OF CREATININE: CPT

## 2020-07-16 PROCEDURE — 74011636320 HC RX REV CODE- 636/320: Performed by: STUDENT IN AN ORGANIZED HEALTH CARE EDUCATION/TRAINING PROGRAM

## 2020-07-16 PROCEDURE — 74177 CT ABD & PELVIS W/CONTRAST: CPT

## 2020-07-16 RX ADMIN — IOPAMIDOL 100 ML: 755 INJECTION, SOLUTION INTRAVENOUS at 09:44

## 2020-07-23 ENCOUNTER — TELEPHONE (OUTPATIENT)
Dept: CARDIOLOGY CLINIC | Age: 85
End: 2020-07-23

## 2020-07-23 NOTE — TELEPHONE ENCOUNTER
Patient would like to reschedule today's appointment. Please advise.     Phone #: [phone]  Thanks

## 2020-10-29 RX ORDER — LOSARTAN POTASSIUM 100 MG/1
TABLET ORAL
Qty: 90 TAB | Refills: 0 | Status: SHIPPED | OUTPATIENT
Start: 2020-10-29 | End: 2021-01-26

## 2021-02-25 RX ORDER — AMLODIPINE BESYLATE 5 MG/1
TABLET ORAL
Qty: 90 TAB | Refills: 1 | Status: SHIPPED | OUTPATIENT
Start: 2021-02-25 | End: 2021-08-16

## 2021-03-01 RX ORDER — LOSARTAN POTASSIUM 100 MG/1
TABLET ORAL
Qty: 30 TAB | Refills: 0 | Status: SHIPPED | OUTPATIENT
Start: 2021-03-01 | End: 2021-03-30

## 2021-03-08 ENCOUNTER — OFFICE VISIT (OUTPATIENT)
Dept: CARDIOLOGY CLINIC | Age: 86
End: 2021-03-08
Payer: MEDICARE

## 2021-03-08 VITALS
SYSTOLIC BLOOD PRESSURE: 144 MMHG | DIASTOLIC BLOOD PRESSURE: 76 MMHG | RESPIRATION RATE: 20 BRPM | HEART RATE: 62 BPM | BODY MASS INDEX: 32.97 KG/M2 | WEIGHT: 222.6 LBS | OXYGEN SATURATION: 98 % | HEIGHT: 69 IN

## 2021-03-08 DIAGNOSIS — R06.02 SOB (SHORTNESS OF BREATH): ICD-10-CM

## 2021-03-08 DIAGNOSIS — E66.09 OBESITY DUE TO EXCESS CALORIES, UNSPECIFIED CLASSIFICATION, UNSPECIFIED WHETHER SERIOUS COMORBIDITY PRESENT: ICD-10-CM

## 2021-03-08 DIAGNOSIS — E78.00 HIGH CHOLESTEROL: Chronic | ICD-10-CM

## 2021-03-08 DIAGNOSIS — I42.9 CARDIOMYOPATHY, UNSPECIFIED TYPE (HCC): Primary | ICD-10-CM

## 2021-03-08 DIAGNOSIS — I48.0 PAF (PAROXYSMAL ATRIAL FIBRILLATION) (HCC): Chronic | ICD-10-CM

## 2021-03-08 DIAGNOSIS — I25.10 CORONARY ARTERY DISEASE INVOLVING NATIVE CORONARY ARTERY OF NATIVE HEART WITHOUT ANGINA PECTORIS: Chronic | ICD-10-CM

## 2021-03-08 DIAGNOSIS — I44.7 LBBB (LEFT BUNDLE BRANCH BLOCK): ICD-10-CM

## 2021-03-08 DIAGNOSIS — I10 HTN (HYPERTENSION), BENIGN: Chronic | ICD-10-CM

## 2021-03-08 DIAGNOSIS — E03.9 ACQUIRED HYPOTHYROIDISM: Chronic | ICD-10-CM

## 2021-03-08 PROCEDURE — G8417 CALC BMI ABV UP PARAM F/U: HCPCS | Performed by: NURSE PRACTITIONER

## 2021-03-08 PROCEDURE — 1101F PT FALLS ASSESS-DOCD LE1/YR: CPT | Performed by: NURSE PRACTITIONER

## 2021-03-08 PROCEDURE — G0463 HOSPITAL OUTPT CLINIC VISIT: HCPCS | Performed by: NURSE PRACTITIONER

## 2021-03-08 PROCEDURE — G8427 DOCREV CUR MEDS BY ELIG CLIN: HCPCS | Performed by: NURSE PRACTITIONER

## 2021-03-08 PROCEDURE — G8432 DEP SCR NOT DOC, RNG: HCPCS | Performed by: NURSE PRACTITIONER

## 2021-03-08 PROCEDURE — 93010 ELECTROCARDIOGRAM REPORT: CPT | Performed by: NURSE PRACTITIONER

## 2021-03-08 PROCEDURE — 99214 OFFICE O/P EST MOD 30 MIN: CPT | Performed by: NURSE PRACTITIONER

## 2021-03-08 PROCEDURE — G8753 SYS BP > OR = 140: HCPCS | Performed by: NURSE PRACTITIONER

## 2021-03-08 PROCEDURE — 93005 ELECTROCARDIOGRAM TRACING: CPT | Performed by: NURSE PRACTITIONER

## 2021-03-08 PROCEDURE — G8754 DIAS BP LESS 90: HCPCS | Performed by: NURSE PRACTITIONER

## 2021-03-08 PROCEDURE — G8536 NO DOC ELDER MAL SCRN: HCPCS | Performed by: NURSE PRACTITIONER

## 2021-03-08 NOTE — PROGRESS NOTES
[address] [place], [address] [address]    Office [phone]  Fax [phone]       Delroy Alfred is a 80 y.o. male last seen by Dr. Anselmo White ~ 1 year ago. Here today for routine annual follow up. Assessment  Encounter Diagnoses   Name Primary?  Cardiomyopathy, unspecified type ([facility] Yes    PAF (paroxysmal atrial fibrillation) (HCC)     LBBB (left bundle branch block)     HTN (hypertension), benign     Coronary artery disease involving native coronary artery of native heart without angina pectoris     Acquired hypothyroidism     SOB (shortness of breath)     Obesity due to excess calories, unspecified classification, unspecified whether serious comorbidity present     High cholesterol        Recommendations:  Cardiomyopathy - EF 40% with G1DD by last Echo 10/2019. Combination of ischemic/nonischemic.- CMRI - 37% possible sarcoidosis. Euvolemic on exam today. Stable Class 2-3 HF sxs.   - Continue Coreg 25 mg BID, Losartan 100 mg daily. - Continue daily weights   - Continue PRN Lasix for wt gain of 3-5 pounds in 2-3 days.    - Repeat Echo in the near future     PAF - irregular by exam and EKG today. No s/sxs of RVR. Not current anticoagulated due to hx of GI bleed 10/2016.   - Continue ASA alone  - Continue Coreg 25 mg BID    HTN - normotensive per home monitoring but high normal in the office today  - Continue current regimen  - Continue low sodium diet  - may consider Aldactone 25 mg daily, should BP require additional reduction, to also optimize GDMT. Non obstructive CAD by cath 5/2014; Small  Vessel dz/non obstructive CAD 10/2019. No anginal symptoms at a reduced functional capacity. - continue ASA and statin     Dyslipidemia - on Simvastatin 20 mg daily. Most recent LDL 68. At goal (< 70) with hx of CAD and IR. Followed by PCP. Chronic dizziness - hx of BPV. Stable.        Return to see Dr. Anselmo White again in 3 months. Sooner PRN. He was encouraged to continue current medications, remain active, follow a heart healthy low sodium diet, lose weight and call with any new complaints or concerns. Subjective:  Mr. Jose Dunbar notes that he is now living in an Assisted living facility. Due to Covid restrictions his activity level has decreased. He does endorese a slight increase in fatigue and reduction in stamina. Stable mild SOB. He notes intermittent palpitations but denies any rapid rates. No lightheadedness or dizziness. No syncope or near syncope. No edema, orthopnea or PND. No recent us of PRN lasix. Normal BP trends per home monitoring. He is happy to report that he has been fully vaccinated against Covid-19. Scheduled to see Dr. Mirella Pelletier within the month and is scheduled for repeat lab work. Cardiac risk factors   HTN yes  DM  Yes; insulin resistance   Smoking no    Personal History  He is at [facility] and is in a cottage. His son owns a [employer]  and checks on him. Patient was  for 60+ years and  lost his wife in 2015.)    Cardiac testing  Cardiac Testing/ Procedures (newest to oldest)     A. Cardiac Cath/PCI:     5/9/14 -   L Main: MLI   LAD: Med to Large prox ; Mid and distal LAD - small to med; At D1 - tandem   50% lesions; D1 - small to med - MLI   LCflex: Large; MLI; OM1;OM2 - MLI   RCA: Subselective inj ( WRP catheter) - tortuous; MLI   LVEDP: 10   LVEF: 40%   No significant gradient across aortic valve. RHC findings:   RAP= 2 mmHg   RVSP= 31 mmHg   PAP= 15 mmHg   PCWP= 6 mmHg   CO= 5.61 l/min   CI=2.64   Closure Device: Manual         B. Echo   10/22/19 - LVEF 40% LV global HK, grade 1 DD, LAE, AV mean gradient 8 mm hg Mild TR     1/11/19 - LVEF 50-55%, grade 1 DD mildly dilated LAD, PASP 34 mmHg       12/8/15 - LVEF 50 %. No WMA. G1DD. Mod LAE. Mild MAC with mild MR. Mod TR.       9/12/14 - LVEF 50 %. Mod diffuse HK. Severe HK of basal mid inferior wall. G1DD. Mod LAE. Mild MAC with mild MR. Mild TR.       5/6/14 - LVEF 25 %. Severe diffuse HK. G1DD. Mild LAE. Mod MAC with mild MR.         C. StressNuclear/Stress ECHO/Stress test:   1/11/19 - Lexiscan Cardiolite - normal perfusion. EF 43%     1/21/13 - no ischemia, EF 62%       D. Vascular:       E. EP:       F. Miscellaneous:  Lipids 1/29/20 - , HDL 32, LDL 68, , VLDL 56     Lipids 1/26/19 - , HDL 39, LDL 63, , VLDL 29     Lipids 2/2/18 - , HDL 33, LDL 40, , VLDL 53     Pertinent past medical history:  (GI bleed in October 2016 when his Eliquis was stopped. As per the discussion during the admission, patient did not want any further workup for his diverticulitis history/repeat procedures as part of the gastroenterologist note and it was recommended that because of the 25% risk of rebleeding if Eliquis was restarted, patient would be on aspirin alone.     Jamey Christine is a 66 y.o. male with a hx of htn, dyslipidemia, hypothyroidism, Gout and prostate cancer s/p prostatectomy admitted for worsened lethargy and dizziness to [facility] on 5/5/14. Adali Graham Hx of  afib with RVR the pt was started on diltiazem but is remaining in the 120-130 range. EKG showed a LBBB in addition to afib. Stress test with Myoview on 1/2013 showed an EF of 62% with no evidence of ischemia. Hx of prior LBBB. Hx of chronic dizziness - BPV. (not assoc with afib).      ECHO showed EF of 34% (  clicnically well compensated) - underwent RHC/LHC - non obstructive CAD. Also had  NSVT. Seen by  [facility]. Started on Amiodarone and Eliquis. During office visit 9/2014 - was not taking amiodarone.  Was in sinus erica and it was not restarted.)    (\"Cool extremeties- admn 5/5/14 - seen by Dr Dorie Wang 5/12/14 - SERAFIN - at ankle (Nml SERAFIN 1.3 bilat with PVR) - neg u/s for DVT and VQ scan\")    (Hx of Anemia w/u 5/5/14 - Dr Mannie Woo - Mild antral gastritis/ Mild sigmoid diverticulosis and small internal hemorrhoids, otherwise mucosa within normal. - per pt -anemia resolved-recurrent GI bleed October 2016. As per the discussion during the admission, patient did not want any further workup for his diverticulitis history/repeat procedures as part of the gastroenterologist note and it was recommended that because of the 25% risk of rebleeding if Eliquis was restarted, patient would be on aspirin alone.)    -concern for amyoidosis on echo but CMRI - possible sarcoidosis versus scarring; no evidence of amyloidosis - EF 37%    Past Medical History:   Diagnosis Date    Arthritis     Asbestos exposure     CAD (coronary artery disease)     non-obstructive CAD    Cancer ([facility]     prostate    Cardiomyopathy ([facility]     LVEF improved     Chronic anticoagulation 10/02/2016    Eliquis    Gout     High cholesterol     Hypertension     LBBB (left bundle branch block)     Memory deficit     PAF (paroxysmal atrial fibrillation) (HCC)     Seizures (HCC)     Vertigo         Current Outpatient Medications   Medication Sig Dispense Refill    losartan (COZAAR) 100 mg tablet TAKE ONE TABLET BY MOUTH DAILY 30 Tab 0    amLODIPine (NORVASC) 5 mg tablet TAKE ONE TABLET BY MOUTH DAILY 90 Tab 1    guaiFENesin (ORGANIDIN) 400 mg tablet Take 400 mg by mouth daily. Indications: CONGESTION      furosemide (LASIX) 20 mg tablet Take 1 Tab by mouth as needed (swelling, weight gain, shortness of breath). 30 Tab 1    memantine (NAMENDA) 10 mg tablet Take 10 mg by mouth two (2) times a day.  nortriptyline (PAMELOR) 10 mg capsule Take 20 mg by mouth nightly. Indications: SLEEP & HEADACHE PREVENTION      artificial tears, dextran 70-hypromellose, (NATURAL BALANCE TEARS) 0.1-0.3 % ophthalmic solution Administer 1 Drop to both eyes as needed (dry eyes).  diphenhydrAMINE-acetaminophen (TYLENOL PM EXTRA STRENGTH)  mg tab Take 2 Tabs by mouth nightly as needed (sleep).  famotidine (PEPCID) 20 mg tablet Take 20 mg by mouth two (2) times a day.       CALCIUM CARBONATE (CALCIUM 500 PO) Take 1 Tab by mouth daily.  levothyroxine (SYNTHROID) 75 mcg tablet Take 75 mcg by mouth Daily (before breakfast).  carvedilol (COREG) 25 mg tablet TAKE ONE TABLET BY MOUTH TWICE A DAY WITH MEALS 60 Tab 1    loratadine (CLARITIN) 10 mg tablet Take 10 mg by mouth daily.  polyethylene glycol (MIRALAX) 17 gram packet Take 17 g by mouth daily.  psyllium (METAMUCIL) packet Take 1 Packet by mouth daily.  simvastatin (ZOCOR) 20 mg tablet Take 20 mg by mouth nightly.  aspirin 81 mg tablet Take 81 mg by mouth nightly.  MULTIVITAMIN PO Take 1 Tab by mouth daily. No Known Allergies       Review of Systems  Constitutional: Negative for fever, chills, and diaphoresis. +fatigue  Respiratory: Negative for cough, hemoptysis, sputum production, and wheezing.  +mild SOB  Cardiovascular: Negative for chest pain, palpitations, orthopnea, claudication, leg swelling and PND. Gastrointestinal: Negative for heartburn, nausea, vomiting, blood in stool and melena. Genitourinary: Negative for dysuria and flank pain. Musculoskeletal: Negative for joint pain and back pain. Skin: Negative for rash. Neurological: Negative for focal weakness, seizures, loss of consciousness, weakness and headaches. Endo/Heme/Allergies: Does not bruise/bleed easily. Psychiatric/Behavioral: Negative for memory loss. The patient does not have insomnia. Physical Exam  Visit Vitals  BP (!) 144/76 (BP 1 Location: Left upper arm, BP Patient Position: Sitting, BP Cuff Size: Adult)   Pulse 62   Resp 20   Ht 5' 9\" (1.753 m)   Wt 222 lb 9.6 oz (101 kg)   SpO2 98%   BMI 32.87 kg/m²        Wt Readings from Last 3 Encounters:   03/08/21 222 lb 9.6 oz (101 kg)   02/27/20 220 lb (99.8 kg)   02/25/20 225 lb (102.1 kg)      General - well developed well nourished  Neck - JVP normal, thyroid nl  Cardiac - irregular, no murmurs, rubs or gallops.  No clicks  Vascular - carotids without bruits, radials, femorals and pedal pulses equal bilateral  Lungs - clear to auscultation bilaterals, no rales ,wheezing or rhonchi  Abd - soft nontender, no HSM, no abd bruits  Extremities - no edema  Skin - no rash  Neuro - nonfocal  Psych - normal mood and affect    Cardiographics  EKG 3/8/21 - atrial rhythm, rate 64. LBBB.  unchanged c/t 2020      Earlene Wright, NP

## 2021-03-08 NOTE — PROGRESS NOTES
Room #: B3      Visit Vitals  BP (!) 144/76 (BP 1 Location: Left upper arm, BP Patient Position: Sitting, BP Cuff Size: Adult)   Pulse 62   Resp 20   Ht 5' 9\" (1.753 m)   Wt 222 lb 9.6 oz (101 kg)   SpO2 98%   BMI 32.87 kg/m²         Chest pain:  NO  Shortness of breath:  NO  Edema: NO  Palpitations, skipped beats, rapid heartbeat:  NO  Dizziness:  NO    1. Have you been to the ER, urgent care clinic since your last visit? Hospitalized since your last visit? No    2. Have you seen or consulted any other health care providers outside of the [address] [place] [address] since your last visit? Include any pap smears or colon screening.  No      Refills:  NO

## 2021-03-16 ENCOUNTER — ANCILLARY PROCEDURE (OUTPATIENT)
Dept: CARDIOLOGY CLINIC | Age: 86
End: 2021-03-16
Payer: MEDICARE

## 2021-03-16 ENCOUNTER — DOCUMENTATION ONLY (OUTPATIENT)
Dept: CARDIOLOGY CLINIC | Age: 86
End: 2021-03-16

## 2021-03-16 VITALS
HEIGHT: 69 IN | WEIGHT: 222 LBS | SYSTOLIC BLOOD PRESSURE: 130 MMHG | BODY MASS INDEX: 32.88 KG/M2 | DIASTOLIC BLOOD PRESSURE: 74 MMHG

## 2021-03-16 DIAGNOSIS — I42.9 CARDIOMYOPATHY, UNSPECIFIED TYPE (HCC): ICD-10-CM

## 2021-03-16 DIAGNOSIS — I10 BENIGN ESSENTIAL HTN: ICD-10-CM

## 2021-03-16 DIAGNOSIS — I44.7 LBBB (LEFT BUNDLE BRANCH BLOCK): ICD-10-CM

## 2021-03-16 DIAGNOSIS — I48.0 PAF (PAROXYSMAL ATRIAL FIBRILLATION) (HCC): ICD-10-CM

## 2021-03-16 PROCEDURE — 93306 TTE W/DOPPLER COMPLETE: CPT | Performed by: INTERNAL MEDICINE

## 2021-03-16 RX ADMIN — PERFLUTREN 1 ML: 6.52 INJECTION, SUSPENSION INTRAVENOUS at 13:47

## 2021-03-18 LAB
ECHO AO ASC DIAM: 3.65 CM
ECHO AO ROOT DIAM: 3.65 CM
ECHO AV AREA PEAK VELOCITY: 1.95 CM2
ECHO AV AREA VTI: 1.84 CM2
ECHO AV AREA/BSA PEAK VELOCITY: 0.9 CM2/M2
ECHO AV AREA/BSA VTI: 0.9 CM2/M2
ECHO AV MEAN GRADIENT: 9.79 MMHG
ECHO AV PEAK GRADIENT: 16.99 MMHG
ECHO AV PEAK VELOCITY: 206.11 CM/S
ECHO AV VTI: 45.16 CM
ECHO EST RA PRESSURE: 3 MMHG
ECHO LA AREA 4C: 28.25 CM2
ECHO LA MAJOR AXIS: 4.67 CM
ECHO LA MINOR AXIS: 2.16 CM
ECHO LA VOL 2C: 131.77 ML (ref 18–58)
ECHO LA VOL 4C: 90.36 ML (ref 18–58)
ECHO LA VOL BP: 113.8 ML (ref 18–58)
ECHO LA VOL/BSA BIPLANE: 52.7 ML/M2 (ref 16–28)
ECHO LA VOLUME INDEX A2C: 61.02 ML/M2 (ref 16–28)
ECHO LA VOLUME INDEX A4C: 41.85 ML/M2 (ref 16–28)
ECHO LV E' LATERAL VELOCITY: 6.45 CM/S
ECHO LV E' SEPTAL VELOCITY: 3.75 CM/S
ECHO LV EDV A2C: 113.91 ML
ECHO LV EDV A4C: 147.94 ML
ECHO LV EDV BP: 138.37 ML (ref 67–155)
ECHO LV EDV INDEX A4C: 68.5 ML/M2
ECHO LV EDV INDEX BP: 64.1 ML/M2
ECHO LV EDV NDEX A2C: 52.8 ML/M2
ECHO LV EJECTION FRACTION A2C: 45 PERCENT
ECHO LV EJECTION FRACTION A4C: 45 PERCENT
ECHO LV EJECTION FRACTION BIPLANE: 45 PERCENT (ref 55–100)
ECHO LV ESV A2C: 62.29 ML
ECHO LV ESV A4C: 81.48 ML
ECHO LV ESV BP: 71.51 ML (ref 22–58)
ECHO LV ESV INDEX A2C: 28.8 ML/M2
ECHO LV ESV INDEX A4C: 37.7 ML/M2
ECHO LV ESV INDEX BP: 33.1 ML/M2
ECHO LV INTERNAL DIMENSION DIASTOLIC: 5.53 CM (ref 4.2–5.9)
ECHO LV INTERNAL DIMENSION SYSTOLIC: 4.13 CM
ECHO LV IVSD: 1.23 CM (ref 0.6–1)
ECHO LV MASS 2D: 277.9 G (ref 88–224)
ECHO LV MASS INDEX 2D: 128.7 G/M2 (ref 49–115)
ECHO LV POSTERIOR WALL DIASTOLIC: 1.19 CM (ref 0.6–1)
ECHO LVOT DIAM: 2.39 CM
ECHO LVOT PEAK GRADIENT: 3.22 MMHG
ECHO LVOT PEAK VELOCITY: 89.73 CM/S
ECHO LVOT SV: 83 ML
ECHO LVOT VTI: 18.48 CM
ECHO MV A VELOCITY: 99.43 CM/S
ECHO MV E DECELERATION TIME (DT): 225.06 MS
ECHO MV E VELOCITY: 74.07 CM/S
ECHO MV E/A RATIO: 0.74
ECHO MV E/E' LATERAL: 11.48
ECHO MV E/E' RATIO (AVERAGED): 15.62
ECHO MV E/E' SEPTAL: 19.75
ECHO MV PRESSURE HALF TIME (PHT): 65.27 MS
ECHO RA AREA 4C: 14.02 CM2
ECHO RIGHT VENTRICULAR SYSTOLIC PRESSURE (RVSP): 31.7 MMHG
ECHO RV TAPSE: 2.12 CM (ref 1.5–2)
ECHO TV REGURGITANT MAX VELOCITY: 258.46 CM/S
ECHO TV REGURGITANT PEAK GRADIENT: 26.72 MMHG
LA VOL DISK BP: 109.22 ML (ref 18–58)

## 2021-03-19 ENCOUNTER — PATIENT MESSAGE (OUTPATIENT)
Dept: CARDIOLOGY CLINIC | Age: 86
End: 2021-03-19

## 2021-03-30 RX ORDER — LOSARTAN POTASSIUM 100 MG/1
TABLET ORAL
Qty: 30 TAB | Refills: 0 | Status: SHIPPED | OUTPATIENT
Start: 2021-03-30 | End: 2021-04-26

## 2021-04-26 RX ORDER — LOSARTAN POTASSIUM 100 MG/1
TABLET ORAL
Qty: 30 TAB | Refills: 0 | Status: SHIPPED | OUTPATIENT
Start: 2021-04-26 | End: 2021-05-28

## 2021-05-28 RX ORDER — LOSARTAN POTASSIUM 100 MG/1
TABLET ORAL
Qty: 30 TABLET | Refills: 1 | Status: SHIPPED | OUTPATIENT
Start: 2021-05-28 | End: 2021-07-26

## 2021-07-26 RX ORDER — LOSARTAN POTASSIUM 100 MG/1
TABLET ORAL
Qty: 30 TABLET | Refills: 1 | Status: SHIPPED | OUTPATIENT
Start: 2021-07-26 | End: 2021-09-24

## 2021-08-16 RX ORDER — AMLODIPINE BESYLATE 5 MG/1
TABLET ORAL
Qty: 90 TABLET | Refills: 1 | Status: SHIPPED | OUTPATIENT
Start: 2021-08-16 | End: 2022-02-09

## 2021-09-08 NOTE — PROGRESS NOTES
Kiera Hood MD    [address] [facility] [place], [address] [facility]    Office [phone],[place] [phone]    Enrique Randall is a 80 y.o. male is here for f/u visit - Afib    Primary care physician:  Jerry Jurado MD      Chief complaint:  As documented in EMR    Assessment:    PAF - - (was on amiodaraone - now off it ( office visit 9/18/14)- \"pt not sure - thought he was supposed to come off it\". Was not restarted. 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 Combination of ischemic/nonischemic.- CMMI - 37% possible sarcoidosis  Chronic dizziness - hx of BPV  \"Cool extremeties- admn 5/5/14 - seen by Dr Annette Clements 5/12/14 - SERAFIN - at ankle (Nml SERAFIN 1.3 bilat with PVR) - neg u/s for DVT and VQ scan\"  (Hx of Anemia w/u 5/5/14 - Dr Hector Liter - Mild antral gastritis/ Mild sigmoid diverticulosis and small internal hemorrhoids, otherwise mucosa within normal. - per pt -anemia resolved-recurrent GI bleed October 2016. As per the discussion during the admission, patient did not want any further workup for his diverticulitis history/repeat procedures as part of the gastroenterologist note and it was recommended that because of the 25% risk of rebleeding if Eliquis was restarted, patient would be on aspirin alone.)            Plan:     Cardiomyopathy/concern for amyoidosis on echo but CMRI - possible sarcoidosis ; no evidence of amyloidosis - EF 37%  On ASA/ B Blocker/ARB /statin-not on ACE inhibitor  Have referred him to pulmonary for further evaluation of possible pulmonary sarcoidosis but because of  Covid, he never got around to doing it. Since he is feeling well, he does not want to get a referral now.     Aggressive CV risk factor modification  Lipids perP, 2/3/2021 , triglycerides 223, HDL 34, LDL 94 . Reviewed records  F/u appt in 6 mths/prn        I appreciate the opportunity to be involved in Mr. Ríos. See note below for details. Please do not hesitate to contact us with questions or concerns. ATTENTION:   This medical record was transcribed using an electronic medical records/speech recognition system. Although proofread, it may and can contain electronic, spelling and other errors. Corrections may be executed at a later time. Please feel free to contact us for any clarifications as needed. Tana Velazquez MD    Cardiac Testing/ Procedures: A. Cardiac Cath/PCI:10/23/19 R CFA access - ultrasound/fluoroscopic/micropuncture access     L Main: Large; Nml     LAD: Ca++; Prox - large; Mid/distal - small to med; Mid LAD at D2 - 60%; D1 - Ca++- prox 30%;  D2 - Med; MLI     LCflex: Prox - Med to large ; Mid - med 40%; Small OM1/OM2; Med OM3/OM4; Inf branch of OM4 - 70%- small vessel     RCA: Dominant; Large; Mid 40%; PDA and PLB - med - MLI     LVEDP: elevated 25     iFR - LAD - 0.91 not signifcant  IVUS LAD - significant ( >270 degrees ) calcium     No significant gradient across aortic valve.        Specimens Removed : None     Complications: None      5/9/14  L Main: MLI  LAD: Med to Large prox ; Mid and distal LAD - small to med; At D1 - tandem  50% lesions; D1 - small to med - MLI  LCflex: Large; MLI; OM1;OM2 - MLI  RCA: Subselective inj ( WRP catheter) - tortuous; MLI  LVEDP: 10  LVEF: 40%  No significant gradient across aortic valve. RHC findings:  RAP= 2 mmHg  RVSP= 31 mmHg  PAP= 15 mmHg  PCWP= 6 mmHg  CO= 5.61 l/min  CI=2.64  Closure Device: Manual    B.ECHO/KIRT: 3/16/21 LV: Estimated LVEF is 40 - 45%. Biplane method used to measure ejection fraction. Normal cavity size. Mild concentric hypertrophy. Mild (grade 1) left ventricular diastolic dysfunction. · MV: Mitral valve leaflet calcification. Mild mitral annular calcification.  Mild mitral valve regurgitation is present. · AV: Moderate aortic valve sclerosis with no significant stenosis. Aortic valve leaflet calcification present. · LA: Severely dilated left atrium. Left Atrium volume index is 52.68 mL/m2. · PA: Pulmonary arterial systolic pressure is 31 mmHg. · Image quality for this study was technically difficult. Contrast used: DEFINITY. 10/22/19 -EF 40%. Grade 1 diastolic dysfunction. Dilated LA. Moderate aortic valve sclerosis without stenosis. Mild MR. Mild tricuspid regurgitation. ?  Cardiac amyloidosis pattern recommend further evaluation    1/11/19-EF 39-28%, grade 1 diastolic dysfunction, mildly dilated LAD, PAS P 34 mmHg    12/8/15 Left ventricle: Systolic function was normal by EF (biplane method of  disks). Ejection fraction was estimated to be 50 %. There were no regional  wall motion abnormalities. Doppler parameters were consistent with  abnormal left ventricular relaxation (grade 1 diastolic dysfunction). Left atrium: The atrium was moderately dilated. Mitral valve: There was mild annular calcification. There was mild  regurgitation. Aortic valve: Normal valve structure. Leaflets exhibited mild  calcification and sclerosis. Tricuspid valve: There was moderate regurgitation. 9/12/14 Left ventricle: Systolic function was mildly reduced. Ejection fraction  was estimated to be 50 %. There was moderate diffuse hypokinesis. There  was severe hypokinesis of the basal-mid inferior wall(s). Doppler  parameters were consistent with abnormal left ventricular relaxation  (grade 1 diastolic dysfunction). Left atrium: The atrium was moderately to markedly dilated. Right atrium: The atrium was dilated. Mitral valve: There was mild annular calcification. There was mild  regurgitation. Tricuspid valve: There was mild regurgitation. 5/6/14 Left ventricle: Size was normal. Systolic function was markedly reduced. Ejection fraction was estimated to be 25 %.  There was severe diffuse  hypokinesis with distinct regional wall motion abnormalities. Doppler  parameters were consistent with abnormal left ventricular relaxation  (grade 1 diastolic dysfunction). Right ventricle: The size was normal. Systolic function was normal.    Left atrium: The atrium was mildly dilated. Mitral valve: There was moderate annular calcification. There was mild  regurgitation. Regurgitation grade was 1+ on a scale of 0 to 4+. C.StressNuclear/Stress ECHO/Stress test: 1/11/19-normal Nila nuclear study. EF 43%    D. Vascular:    E. EP:    F. Miscellaneous: CMRI 1/15/2020 IMPRESSION       1. Normal left ventricular cavity size with moderate left ventricular systolic  dysfunction. Abnormal septal motion consistent with intraventricular conduction  delay. Mild global hypokinesis is present. 3-D LVEF 37%. 2. Normal right ventricular size and systolic function. RVEF 50%. 3. Mildly sclerotic aortic valve leaflet with minimal aortic stenosis. Aortic  valve area is 1.9 cm with by planimetry. 4. On EGE and LGE study, there is patchy areas of myocardial enhancement  particularly affecting the mid wall of the basal septum, base to mid inferior RV  insertion point, base to mid anterolateral wall sparing the subendocardium. The  differential diagnoses include possibility of infiltrative cardiac sarcoidosis  versus nonspecific myocardial scarring from nonischemic cardiomyopathy. No  features suggestive of cardiac amyloidosis. No features of recent or old  myocardial infarction. No features suggestive of viral myocarditis. 5. Small paratracheal and azygous esophageal lymph nodes are present. Further  dedicated evaluation of these lymph nodes is recommended given concern for  possible cardiac sarcoidosis. 6. Normal pleura and pericardium. There is no significant effusions. Subjective:  Carmina Wells is a 80 y.o. male who returns for follow up visit. No chest pain, dyspnea.     No swelling lower extremities. Doing well. (GI bleed in October 2016 when his Eliquis was stopped. As per the discussion during the admission, patient did not want any further workup for his diverticulitis history/repeat procedures as part of the gastroenterologist note and it was recommended that because of the 25% risk of rebleeding if Eliquis was restarted, patient would be on aspirin alone. He is at [facility] and is in a cottage. His son owns a [employer]s  and checks on him. Patient was  for 60+ years and  lost his wife in 2015.)        (Shawna Joseph is a 66 y.o. male with a hx of htn, dyslipidemia, hypothyroidism, Gout and prostate cancer s/p prostatectomy admitted for worsened lethargy and dizziness to [facility] on 5/5/14. Alberto Whitney Hx of  afib with RVR the pt was started on diltiazem but is remaining in the 120-130 range. EKG showed a LBBB in addition to afib. Stress test with Myoview on 1/2013 showed an EF of 62% with no evidence of ischemia. Hx of prior LBBB. Hx of chronic dizziness - BPV. (not assoc with afib). ECHO showed EF of 24% (  clicnically well compensated) - underwent RHC/LHC - non obstructive CAD. Also had  NSVT. Seen by  [facility]. Started on Amiodarone and Eliquis. During office visit 9/2014 - was not taking amiodarone. Was in sinus erica and it was not restarted.)                   ROS:  (bold if positive, if negative)             Medications before admission:      Current Outpatient Medications:     traZODone (DESYREL) 50 mg tablet, , Disp: , Rfl:     amLODIPine (NORVASC) 5 mg tablet, TAKE ONE TABLET BY MOUTH DAILY, Disp: 90 Tablet, Rfl: 1    losartan (COZAAR) 100 mg tablet, TAKE ONE TABLET BY MOUTH DAILY, Disp: 30 Tablet, Rfl: 1    guaiFENesin (ORGANIDIN) 400 mg tablet, Take 400 mg by mouth daily.  Indications: CONGESTION, Disp: , Rfl:     memantine (NAMENDA) 10 mg tablet, Take 10 mg by mouth two (2) times a day., Disp: , Rfl:     nortriptyline (PAMELOR) 10 mg capsule, Take 20 mg by mouth nightly. Indications: SLEEP & HEADACHE PREVENTION, Disp: , Rfl:     artificial tears, dextran 70-hypromellose, (NATURAL BALANCE TEARS) 0.1-0.3 % ophthalmic solution, Administer 1 Drop to both eyes as needed (dry eyes). , Disp: , Rfl:     famotidine (PEPCID) 20 mg tablet, Take 20 mg by mouth two (2) times a day., Disp: , Rfl:     CALCIUM CARBONATE (CALCIUM 500 PO), Take 1 Tab by mouth daily. , Disp: , Rfl:     levothyroxine (SYNTHROID) 75 mcg tablet, Take 75 mcg by mouth Daily (before breakfast). , Disp: , Rfl:     carvedilol (COREG) 25 mg tablet, TAKE ONE TABLET BY MOUTH TWICE A DAY WITH MEALS, Disp: 60 Tab, Rfl: 1    loratadine (CLARITIN) 10 mg tablet, Take 10 mg by mouth daily. , Disp: , Rfl:     polyethylene glycol (MIRALAX) 17 gram packet, Take 17 g by mouth daily. , Disp: , Rfl:     simvastatin (ZOCOR) 20 mg tablet, Take 20 mg by mouth nightly., Disp: , Rfl:     aspirin 81 mg tablet, Take 81 mg by mouth nightly., Disp: , Rfl:     MULTIVITAMIN PO, Take 1 Tab by mouth daily. , Disp: , Rfl:         Physical Exam:  Visit Vitals  /60 (BP 1 Location: Left upper arm, BP Patient Position: Sitting)   Pulse (!) 57   Ht 5' 9\" (1.753 m)   Wt 228 lb 9.6 oz (103.7 kg)   SpO2 95%   BMI 33.76 kg/m²          Gen: Well-developed, well-nourished, in no acute distress  Neck: Supple,No JVD, No Carotid Bruit,   Resp: No accessory muscle use, Dec AE Bilat, No rales or rhonchi  Card: Regular Rate,Rythm,Normal S1, S2, No murmurs, rubs or gallop.    Abd:  Soft, non-tender, non-distended,BS+,   MSK: No cyanosis  Neuro: moving all four extremities , follows commands appropriately  Psych:  Good insight, oriented to person, place , alert, Nml Affect  LE: No edema    EKG:      LABS:        Lab Results   Component Value Date/Time    WBC 11.5 (H) 10/24/2019 02:11 AM    HGB 14.6 10/24/2019 02:11 AM    HCT 42.9 10/24/2019 02:11 AM    PLATELET 519 55/56/7583 02:11 AM    MCV 89.4 10/24/2019 02:11 AM     Lab Results Component Value Date/Time    Sodium 139 10/29/2019 11:19 AM    Potassium 4.2 10/29/2019 11:19 AM    Chloride 99 10/29/2019 11:19 AM    CO2 26 10/29/2019 11:19 AM    Anion gap 7 10/24/2019 02:11 AM    Glucose 125 (H) 10/29/2019 11:19 AM    BUN 12 10/29/2019 11:19 AM    Creatinine 1.34 (H) 10/29/2019 11:19 AM    BUN/Creatinine ratio 9 (L) 10/29/2019 11:19 AM    GFR est AA 56 (L) 10/29/2019 11:19 AM    GFR est non-AA 48 (L) 10/29/2019 11:19 AM    Calcium 9.9 10/29/2019 11:19 AM       Lab Results   Component Value Date/Time    aPTT 57.0 (H) 10/23/2019 05:10 AM     Lab Results   Component Value Date/Time    INR 1.1 10/02/2016 09:16 AM    INR 1.0 05/05/2014 10:30 AM    Prothrombin time 10.8 10/02/2016 09:16 AM    Prothrombin time 10.5 05/05/2014 10:30 AM     No components found for: Noe Hearn MD

## 2021-09-09 ENCOUNTER — OFFICE VISIT (OUTPATIENT)
Dept: CARDIOLOGY CLINIC | Age: 86
End: 2021-09-09
Payer: MEDICARE

## 2021-09-09 VITALS
OXYGEN SATURATION: 95 % | DIASTOLIC BLOOD PRESSURE: 60 MMHG | WEIGHT: 228.6 LBS | HEART RATE: 57 BPM | HEIGHT: 69 IN | BODY MASS INDEX: 33.86 KG/M2 | SYSTOLIC BLOOD PRESSURE: 128 MMHG

## 2021-09-09 DIAGNOSIS — I25.10 CORONARY ARTERY DISEASE INVOLVING NATIVE CORONARY ARTERY OF NATIVE HEART WITHOUT ANGINA PECTORIS: ICD-10-CM

## 2021-09-09 DIAGNOSIS — E78.00 HIGH CHOLESTEROL: ICD-10-CM

## 2021-09-09 DIAGNOSIS — I44.7 LBBB (LEFT BUNDLE BRANCH BLOCK): ICD-10-CM

## 2021-09-09 DIAGNOSIS — I42.9 CARDIOMYOPATHY, UNSPECIFIED TYPE (HCC): ICD-10-CM

## 2021-09-09 DIAGNOSIS — I48.0 PAF (PAROXYSMAL ATRIAL FIBRILLATION) (HCC): Primary | ICD-10-CM

## 2021-09-09 PROCEDURE — G8427 DOCREV CUR MEDS BY ELIG CLIN: HCPCS | Performed by: INTERNAL MEDICINE

## 2021-09-09 PROCEDURE — G0463 HOSPITAL OUTPT CLINIC VISIT: HCPCS | Performed by: INTERNAL MEDICINE

## 2021-09-09 PROCEDURE — 1101F PT FALLS ASSESS-DOCD LE1/YR: CPT | Performed by: INTERNAL MEDICINE

## 2021-09-09 PROCEDURE — G8417 CALC BMI ABV UP PARAM F/U: HCPCS | Performed by: INTERNAL MEDICINE

## 2021-09-09 PROCEDURE — G8536 NO DOC ELDER MAL SCRN: HCPCS | Performed by: INTERNAL MEDICINE

## 2021-09-09 PROCEDURE — 99214 OFFICE O/P EST MOD 30 MIN: CPT | Performed by: INTERNAL MEDICINE

## 2021-09-09 PROCEDURE — G8432 DEP SCR NOT DOC, RNG: HCPCS | Performed by: INTERNAL MEDICINE

## 2021-09-09 RX ORDER — TRAZODONE HYDROCHLORIDE 50 MG/1
TABLET ORAL
COMMUNITY
Start: 2021-08-13

## 2021-09-09 NOTE — LETTER
9/10/2021    Patient: Ly Aguilar   YOB: 1935   Date of Visit: 2021     Juma Moreno MD  [facility] [address]  Via Fax: [phone]    Dear Juma Moreno MD,      Thank you for referring Mr. Lynette Ivy to [address] for evaluation. My notes for this consultation are attached. If you have questions, please do not hesitate to call me. I look forward to following your patient along with you.       Sincerely,    Anders Ogden MD

## 2021-09-09 NOTE — PROGRESS NOTES
Chief Complaint   Patient presents with    Follow-up    Hypertension    Coronary Artery Disease       Visit Vitals  /60 (BP 1 Location: Left upper arm, BP Patient Position: Sitting)   Pulse (!) 57   Ht 5' 9\" (1.753 m)   Wt 228 lb 9.6 oz (103.7 kg)   SpO2 95%   BMI 33.76 kg/m²       Chest pain denied  SOB - denied  Dizziness denied  Swelling/Edema -denied  Recent hospital visit denied  Refills denied

## 2021-09-24 RX ORDER — LOSARTAN POTASSIUM 100 MG/1
TABLET ORAL
Qty: 30 TABLET | Refills: 3 | Status: SHIPPED | OUTPATIENT
Start: 2021-09-24 | End: 2022-01-18

## 2022-02-09 RX ORDER — AMLODIPINE BESYLATE 5 MG/1
TABLET ORAL
Qty: 90 TABLET | Refills: 1 | Status: SHIPPED | OUTPATIENT
Start: 2022-02-09 | End: 2022-08-15

## 2022-03-18 PROBLEM — J96.01 ACUTE RESPIRATORY FAILURE WITH HYPOXIA (HCC): Status: ACTIVE | Noted: 2019-10-22

## 2022-03-19 PROBLEM — R79.89 ELEVATED TROPONIN: Status: ACTIVE | Noted: 2019-10-22

## 2022-03-19 PROBLEM — R77.8 ELEVATED TROPONIN: Status: ACTIVE | Noted: 2019-10-22

## 2022-03-19 PROBLEM — R07.9 CHEST PAIN: Status: ACTIVE | Noted: 2019-10-22

## 2022-03-19 PROBLEM — E66.9 OBESITY: Status: ACTIVE | Noted: 2019-04-26

## 2022-03-19 PROBLEM — R06.02 SOB (SHORTNESS OF BREATH): Status: ACTIVE | Noted: 2019-10-22

## 2022-03-19 PROBLEM — I50.9 CHF EXACERBATION (HCC): Status: ACTIVE | Noted: 2019-10-22

## 2022-03-20 PROBLEM — I21.4 NSTEMI (NON-ST ELEVATED MYOCARDIAL INFARCTION) (HCC): Status: ACTIVE | Noted: 2019-10-22

## 2022-08-15 RX ORDER — AMLODIPINE BESYLATE 5 MG/1
TABLET ORAL
Qty: 90 TABLET | Refills: 1 | Status: SHIPPED | OUTPATIENT
Start: 2022-08-15

## 2022-10-07 ENCOUNTER — HOSPITAL ENCOUNTER (EMERGENCY)
Age: 87
Discharge: HOME | End: 2022-10-08
Attending: EMERGENCY MEDICINE
Payer: MEDICARE

## 2022-10-07 DIAGNOSIS — L03.031 CELLULITIS OF FIFTH TOE OF RIGHT FOOT: Primary | ICD-10-CM

## 2022-10-07 LAB
COMMENT, HOLDF: NORMAL
SAMPLES BEING HELD,HOLD: NORMAL

## 2022-10-07 PROCEDURE — 87077 CULTURE AEROBIC IDENTIFY: CPT

## 2022-10-07 PROCEDURE — 99284 EMERGENCY DEPT VISIT MOD MDM: CPT

## 2022-10-07 PROCEDURE — 36415 COLL VENOUS BLD VENIPUNCTURE: CPT

## 2022-10-07 PROCEDURE — 85025 COMPLETE CBC W/AUTO DIFF WBC: CPT

## 2022-10-07 PROCEDURE — 87205 SMEAR GRAM STAIN: CPT

## 2022-10-07 PROCEDURE — 87186 SC STD MICRODIL/AGAR DIL: CPT

## 2022-10-07 PROCEDURE — 80053 COMPREHEN METABOLIC PANEL: CPT

## 2022-10-07 PROCEDURE — 86140 C-REACTIVE PROTEIN: CPT

## 2022-10-07 PROCEDURE — 84145 PROCALCITONIN (PCT): CPT

## 2022-10-08 ENCOUNTER — APPOINTMENT (OUTPATIENT)
Dept: GENERAL RADIOLOGY | Age: 87
End: 2022-10-08
Attending: EMERGENCY MEDICINE
Payer: MEDICARE

## 2022-10-08 VITALS
TEMPERATURE: 98.6 F | SYSTOLIC BLOOD PRESSURE: 154 MMHG | HEIGHT: 68 IN | DIASTOLIC BLOOD PRESSURE: 74 MMHG | OXYGEN SATURATION: 99 % | WEIGHT: 220.46 LBS | RESPIRATION RATE: 14 BRPM | BODY MASS INDEX: 33.41 KG/M2 | HEART RATE: 66 BPM

## 2022-10-08 LAB
ALBUMIN SERPL-MCNC: 3.8 G/DL (ref 3.5–5)
ALBUMIN/GLOB SERPL: 1 {RATIO} (ref 1.1–2.2)
ALP SERPL-CCNC: 71 U/L (ref 45–117)
ALT SERPL-CCNC: 32 U/L (ref 12–78)
ANION GAP SERPL CALC-SCNC: 5 MMOL/L (ref 5–15)
AST SERPL-CCNC: 31 U/L (ref 15–37)
BASOPHILS # BLD: 0.1 K/UL (ref 0–0.1)
BASOPHILS NFR BLD: 1 % (ref 0–1)
BILIRUB SERPL-MCNC: 0.3 MG/DL (ref 0.2–1)
BUN SERPL-MCNC: 27 MG/DL (ref 6–20)
BUN/CREAT SERPL: 13 (ref 12–20)
CALCIUM SERPL-MCNC: 9.7 MG/DL (ref 8.5–10.1)
CHLORIDE SERPL-SCNC: 107 MMOL/L (ref 97–108)
CO2 SERPL-SCNC: 29 MMOL/L (ref 21–32)
CREAT SERPL-MCNC: 2.06 MG/DL (ref 0.7–1.3)
CRP SERPL-MCNC: 0.74 MG/DL (ref 0–0.6)
DIFFERENTIAL METHOD BLD: NORMAL
EOSINOPHIL # BLD: 0.2 K/UL (ref 0–0.4)
EOSINOPHIL NFR BLD: 2 % (ref 0–7)
ERYTHROCYTE [DISTWIDTH] IN BLOOD BY AUTOMATED COUNT: 14 % (ref 11.5–14.5)
GLOBULIN SER CALC-MCNC: 3.9 G/DL (ref 2–4)
GLUCOSE SERPL-MCNC: 100 MG/DL (ref 65–100)
HCT VFR BLD AUTO: 45.7 % (ref 36.6–50.3)
HGB BLD-MCNC: 15.4 G/DL (ref 12.1–17)
IMM GRANULOCYTES # BLD AUTO: 0 K/UL (ref 0–0.04)
IMM GRANULOCYTES NFR BLD AUTO: 0 % (ref 0–0.5)
LYMPHOCYTES # BLD: 2.4 K/UL (ref 0.8–3.5)
LYMPHOCYTES NFR BLD: 27 % (ref 12–49)
MCH RBC QN AUTO: 30.9 PG (ref 26–34)
MCHC RBC AUTO-ENTMCNC: 33.7 G/DL (ref 30–36.5)
MCV RBC AUTO: 91.6 FL (ref 80–99)
MONOCYTES # BLD: 1 K/UL (ref 0–1)
MONOCYTES NFR BLD: 12 % (ref 5–13)
NEUTS SEG # BLD: 5.3 K/UL (ref 1.8–8)
NEUTS SEG NFR BLD: 58 % (ref 32–75)
NRBC # BLD: 0 K/UL (ref 0–0.01)
NRBC BLD-RTO: 0 PER 100 WBC
PLATELET # BLD AUTO: 192 K/UL (ref 150–400)
PMV BLD AUTO: 9.6 FL (ref 8.9–12.9)
POTASSIUM SERPL-SCNC: 4.4 MMOL/L (ref 3.5–5.1)
PROCALCITONIN SERPL-MCNC: <0.05 NG/ML
PROT SERPL-MCNC: 7.7 G/DL (ref 6.4–8.2)
RBC # BLD AUTO: 4.99 M/UL (ref 4.1–5.7)
SODIUM SERPL-SCNC: 141 MMOL/L (ref 136–145)
WBC # BLD AUTO: 9.1 K/UL (ref 4.1–11.1)

## 2022-10-08 PROCEDURE — 73630 X-RAY EXAM OF FOOT: CPT

## 2022-10-08 NOTE — ED TRIAGE NOTES
Pt arrived ambulatory with family c/o R foot pain onset yesterday. Pt denies any injury. Wound to R foot in between 4th and 5th toes with redness and pus drainage. Denies recent fever. Given Doxy yest at Urgent care. Pt took 1 dose this afternoon.

## 2022-10-08 NOTE — ED NOTES
Went over [employer] instructions with pt and son at bedside, had no further questions. Pt ano x 4 gcs 15. Wrapped toes up with 4x4 and krilex. Pt ambulating with steady gait and talking in complete sentences.  Pt going home with son

## 2022-10-08 NOTE — ED PROVIDER NOTES
79-year-old male who presents to the ER with a complaint of increased pain and swelling to the right great toe with redness that was noted approximately 2 to 3 days ago. It is unknown whether or not the patient hit his toe but family also noticed some purulent redness at the base of the left small toe with rupture of the skin between the fourth toe and the fifth toe. The patient was seen at urgent care and placed on doxycycline. The patient ambulate without discomfort denies any further injury at this time.        Past Medical History:   Diagnosis Date    Arthritis     Asbestos exposure     CAD (coronary artery disease)     non-obstructive CAD    Cancer ([facility]     prostate    Cardiomyopathy ([facility]     LVEF improved     Chronic anticoagulation 10/02/2016    Eliquis    Gout     High cholesterol     Hypertension     LBBB (left bundle branch block)     Memory deficit     PAF (paroxysmal atrial fibrillation) (HCC)     Seizures (HCC)     Vertigo        Past Surgical History:   Procedure Laterality Date    HX HERNIA REPAIR      HX MOHS PROCEDURES Right     HX PROSTATECTOMY      HX UROLOGICAL      penile implant         Family History:   Problem Relation Age of Onset    Cancer Father        Social History     Socioeconomic History    Marital status:      Spouse name: Not on file    Number of children: Not on file    Years of education: Not on file    Highest education level: Not on file   Occupational History    Not on file   Tobacco Use    Smoking status: Former    Smokeless tobacco: Never   Substance and Sexual Activity    Alcohol use: No    Drug use: No    Sexual activity: Not on file   Other Topics Concern    Not on file   Social History Narrative    Not on file     Social Determinants of Health     Financial Resource Strain: Not on file   Food Insecurity: Not on file   Transportation Needs: Not on file   Physical Activity: Not on file   Stress: Not on file   Social Connections: Not on file   Intimate Partner Violence: Not on file   Housing Stability: Not on file         ALLERGIES: Patient has no known allergies. Review of Systems   All other systems reviewed and are negative. Vitals:    10/07/22 2237 10/08/22 0002 10/08/22 0032   BP: 115/65 132/70 (!) 155/66   Pulse: 64     Resp: 18     Temp: 98 °F (36.7 °C)     SpO2: 97% 95% 97%   Weight: 100 kg (220 lb 7.4 oz)     Height: 5' 8\" (1.727 m)              Physical Exam  Vitals and nursing note reviewed. Exam conducted with a chaperone present. CONSTITUTIONAL: Well-appearing; well-nourished; in no apparent distress  HEAD: Normocephalic; atraumatic  EYES: PERRL; EOM intact; conjunctiva and sclera are clear bilaterally. ENT: No rhinorrhea; normal pharynx with no tonsillar hypertrophy; mucous membranes pink/moist, no erythema, no exudate. NECK: Supple; non-tender; no cervical lymphadenopathy  CARD: Normal S1, S2; no murmurs, rubs, or gallops. Regular rate and rhythm. RESP: Normal respiratory effort; breath sounds clear and equal bilaterally; no wheezes, rhonchi, or rales. ABD: Normal bowel sounds; non-distended; non-tender; no palpable organomegaly, no masses, no bruits. Back Exam: Normal inspection; no vertebral point tenderness, no CVA tenderness. Normal range of motion. EXT: Tenderness to palpation on the lateral aspect of the right foot and fifth toe without any deformity; slight drainage and erythema in the webspace between the fourth and the fifth toe.; distal pulses are normal, no edema. SKIN: Warm; dry; . NEURO:Alert and oriented x 3, coherent, DRACI-XII grossly intact, sensory and motor are non-focal.        MDM  Number of Diagnoses or Management Options  Cellulitis of fifth toe of right foot  Diagnosis management comments: Assessment: Cellulitis of the right great toe rule out osteomyelitis/toe infection    Plan: Lab/x-ray of the right foot/education, reassurance, symptomatic treatment/ Monitor and Reevaluate.          Amount and/or Complexity of Data Reviewed  Clinical lab tests: ordered and reviewed  Tests in the radiology section of CPT®: ordered and reviewed  Tests in the medicine section of CPT®: reviewed and ordered  Discussion of test results with the performing providers: yes  Decide to obtain previous medical records or to obtain history from someone other than the patient: yes  Obtain history from someone other than the patient: yes  Review and summarize past medical records: yes  Discuss the patient with other providers: yes  Independent visualization of images, tracings, or specimens: yes           Procedures    Progress Note:   Pt has been reexamined by Shea Trevino MD. Pt is feeling much better. Symptoms have improved. All available results have been reviewed with pt and any available family. Pt understands sx, dx, and tx in ED. Care plan has been outlined and questions have been answered. Pt is ready to go home. Will send home on cellulitis of the right small toe instruction. Continue doxycycline as previously instructed by your doctor. . Outpatient referral with PCP/podiatrist for reevaluation and further treatment as needed. Written by Shea Trevino MD,4:53 AM    .   .

## 2022-10-10 LAB
BACTERIA SPEC CULT: ABNORMAL
GRAM STN SPEC: ABNORMAL
SERVICE CMNT-IMP: ABNORMAL

## 2022-10-10 RX ORDER — NYSTATIN 100000 [USP'U]/G
POWDER TOPICAL
Qty: 15 G | Refills: 0 | Status: SHIPPED | OUTPATIENT
Start: 2022-10-10

## 2022-10-10 RX ORDER — CEPHALEXIN 500 MG/1
500 CAPSULE ORAL 2 TIMES DAILY
Qty: 14 CAPSULE | Refills: 0 | Status: SHIPPED | OUTPATIENT
Start: 2022-10-10 | End: 2022-10-17

## 2022-10-10 NOTE — PROGRESS NOTES
I called and spoke with patient concerning culture result. Informed to stop doxycycline.   [address] for keflex 500 mg bid x 7 d to [facility] on [place] and nystatin powder to be used twice a day

## 2023-09-13 ENCOUNTER — TELEPHONE (OUTPATIENT)
Age: 88
End: 2023-09-13

## 2023-09-13 ENCOUNTER — OFFICE VISIT (OUTPATIENT)
Age: 88
End: 2023-09-13
Payer: MEDICARE

## 2023-09-13 VITALS
HEIGHT: 68 IN | WEIGHT: 184 LBS | DIASTOLIC BLOOD PRESSURE: 64 MMHG | HEART RATE: 44 BPM | OXYGEN SATURATION: 100 % | RESPIRATION RATE: 18 BRPM | SYSTOLIC BLOOD PRESSURE: 132 MMHG | BODY MASS INDEX: 27.89 KG/M2

## 2023-09-13 DIAGNOSIS — I50.20 HFREF (HEART FAILURE WITH REDUCED EJECTION FRACTION) (HCC): ICD-10-CM

## 2023-09-13 DIAGNOSIS — I44.7 LBBB (LEFT BUNDLE BRANCH BLOCK): ICD-10-CM

## 2023-09-13 DIAGNOSIS — I42.9 CARDIOMYOPATHY, UNSPECIFIED TYPE (HCC): ICD-10-CM

## 2023-09-13 DIAGNOSIS — I48.0 PAF (PAROXYSMAL ATRIAL FIBRILLATION) (HCC): ICD-10-CM

## 2023-09-13 DIAGNOSIS — I10 HTN (HYPERTENSION), BENIGN: Primary | ICD-10-CM

## 2023-09-13 DIAGNOSIS — R00.1 BRADYCARDIA: ICD-10-CM

## 2023-09-13 PROCEDURE — 99214 OFFICE O/P EST MOD 30 MIN: CPT

## 2023-09-13 PROCEDURE — 93010 ELECTROCARDIOGRAM REPORT: CPT

## 2023-09-13 PROCEDURE — G8427 DOCREV CUR MEDS BY ELIG CLIN: HCPCS

## 2023-09-13 PROCEDURE — 93005 ELECTROCARDIOGRAM TRACING: CPT

## 2023-09-13 PROCEDURE — G8419 CALC BMI OUT NRM PARAM NOF/U: HCPCS

## 2023-09-13 PROCEDURE — 1036F TOBACCO NON-USER: CPT

## 2023-09-13 PROCEDURE — 1123F ACP DISCUSS/DSCN MKR DOCD: CPT

## 2023-09-13 RX ORDER — FLUOXETINE HYDROCHLORIDE 20 MG/1
20 CAPSULE ORAL DAILY
COMMUNITY

## 2023-09-13 RX ORDER — RABEPRAZOLE SODIUM 20 MG/1
20 TABLET, DELAYED RELEASE ORAL DAILY
COMMUNITY

## 2023-09-13 RX ORDER — ASPIRIN 81 MG/1
81 TABLET, CHEWABLE ORAL DAILY
COMMUNITY

## 2023-09-13 RX ORDER — CARVEDILOL 12.5 MG/1
12.5 TABLET ORAL 2 TIMES DAILY WITH MEALS
Qty: 180 TABLET | Refills: 1 | Status: SHIPPED | OUTPATIENT
Start: 2023-09-13

## 2023-09-13 RX ORDER — LOSARTAN POTASSIUM 25 MG/1
25 TABLET ORAL DAILY
Qty: 30 TABLET | Refills: 3 | Status: SHIPPED | OUTPATIENT
Start: 2023-09-13

## 2023-09-13 NOTE — PATIENT INSTRUCTIONS
Please reduce coreg to 12.5 mg twice daily. Please monitor heart rate and blood pressure. Please begin taking losartan 25 mg daily.

## 2023-09-13 NOTE — PROGRESS NOTES
[facility]  [address] [facility] [place]  Office [phone],[facility] [phone]                                                                                                                                       Primary Cardiologist:  Karina Garrison MD  Last Office Visit: 9/9/21       Assessment  Bradycardia - on coreg 25 mg BID  PAF - - (He was on Eliquis but had a GI bleed in October 2016 (please see discussion below) and is now on aspirin.)   HTN   LBBB ( old)   Hx of NSVT   Non obstructive CAD by cath 5/2014; Small  Vessel dz/non obstructive CAD 10/2019   Cardiomyopathy -EF  80%/HZWXE 1 diastolic dysfunction. Combination of ischemic/nonischemic.- CMMI - 37% possible sarcoidosis   Chronic dizziness - hx of BPV   \"Cool extremeties- admn 5/5/14 - seen by Dr Larissa Cortez 5/12/14 - JULY - at ankle (Nml JULY 1.3 bilat with PVR) - neg u/s for DVT and VQ scan\"   (Hx of Anemia w/u 5/5/14 - Dr Sandra Carrera - Mild antral gastritis/ Mild sigmoid diverticulosis and small internal hemorrhoids, otherwise mucosa within normal. - per pt -anemia resolved-recurrent  GI bleed October 2016. As per the discussion during the admission, patient did not want any further workup for his diverticulitis history/repeat procedures as part of the gastroenterologist note and it was recommended that because of the 25% risk of rebleeding  if Eliquis was restarted, patient would be on aspirin alone.)     Plan  Will reduce coreg to 12.5 mg BID for bradycardia  Add losartan 25 mg daily for GDMT for cardiomyopathy  Check echo   Stopped statin? Will request records from PCP   Cont aspirin- not on eliquis due to history of anemia/GI bleed    Cardiomyopathy/concern for amyoidosis on echo but CMRI - possible sarcoidosis ; no evidence of amyloidosis - EF 37%    Have referred him to pulmonary for further evaluation of possible pulmonary sarcoidosis but because of  Covid, he never got around to doing it.   Since he is

## 2023-09-13 NOTE — PROGRESS NOTES
Chief Complaint   Patient presents with    Follow-up     Bradycardia d/t medication? Vitals:    09/13/23 1330   BP: 132/64   Site: Right Upper Arm   Position: Sitting   Cuff Size: Medium Adult   Pulse: (!) 44   Resp: 18   SpO2: 100%   Weight: 184 lb (83.5 kg)   Height: 5' 8\" (1.727 m)     Patient presents in office without cardiac complaint. Patient admits to feeling tired lately and cold. No recent hospital visits. No refills needed at this time.

## 2023-09-15 ENCOUNTER — TELEPHONE (OUTPATIENT)
Age: 88
End: 2023-09-15

## 2023-09-15 NOTE — TELEPHONE ENCOUNTER
Called pt to verify if the appt. on 9/20/23 was an error or did he need that appt. Pt stated he did not have any cardiac complaint at this time. I did remind him of his echo and appt. with provider on 10/13/23 he asked for a reminder call when the appt. gets closer.

## 2023-09-22 ENCOUNTER — OFFICE VISIT (OUTPATIENT)
Age: 88
End: 2023-09-22
Payer: MEDICARE

## 2023-09-22 VITALS
HEART RATE: 72 BPM | HEIGHT: 68 IN | DIASTOLIC BLOOD PRESSURE: 70 MMHG | WEIGHT: 185 LBS | OXYGEN SATURATION: 98 % | BODY MASS INDEX: 28.04 KG/M2 | SYSTOLIC BLOOD PRESSURE: 120 MMHG

## 2023-09-22 DIAGNOSIS — I42.9 CARDIOMYOPATHY, UNSPECIFIED TYPE (HCC): ICD-10-CM

## 2023-09-22 DIAGNOSIS — I10 HTN (HYPERTENSION), BENIGN: ICD-10-CM

## 2023-09-22 DIAGNOSIS — I48.0 PAF (PAROXYSMAL ATRIAL FIBRILLATION) (HCC): ICD-10-CM

## 2023-09-22 DIAGNOSIS — R00.1 BRADYCARDIA: Primary | ICD-10-CM

## 2023-09-22 DIAGNOSIS — I44.7 LBBB (LEFT BUNDLE BRANCH BLOCK): ICD-10-CM

## 2023-09-22 DIAGNOSIS — I50.20 HFREF (HEART FAILURE WITH REDUCED EJECTION FRACTION) (HCC): ICD-10-CM

## 2023-09-22 PROCEDURE — 99214 OFFICE O/P EST MOD 30 MIN: CPT

## 2023-09-22 PROCEDURE — 1036F TOBACCO NON-USER: CPT

## 2023-09-22 PROCEDURE — G8427 DOCREV CUR MEDS BY ELIG CLIN: HCPCS

## 2023-09-22 PROCEDURE — G8419 CALC BMI OUT NRM PARAM NOF/U: HCPCS

## 2023-09-22 PROCEDURE — 1123F ACP DISCUSS/DSCN MKR DOCD: CPT

## 2023-09-22 ASSESSMENT — PATIENT HEALTH QUESTIONNAIRE - PHQ9
SUM OF ALL RESPONSES TO PHQ QUESTIONS 1-9: 0
SUM OF ALL RESPONSES TO PHQ9 QUESTIONS 1 & 2: 0
SUM OF ALL RESPONSES TO PHQ QUESTIONS 1-9: 0
2. FEELING DOWN, DEPRESSED OR HOPELESS: 0
1. LITTLE INTEREST OR PLEASURE IN DOING THINGS: 0
SUM OF ALL RESPONSES TO PHQ QUESTIONS 1-9: 0
SUM OF ALL RESPONSES TO PHQ QUESTIONS 1-9: 0

## 2023-09-22 NOTE — PROGRESS NOTES
Chief Complaint   Patient presents with    Follow-up     2 wks follow up     Hypertension     Vitals:    09/22/23 1316   BP: 120/70   Site: Left Upper Arm   Position: Sitting   Pulse: 72   SpO2: 98%   Weight: 185 lb (83.9 kg)   Height: 5' 8\" (1.727 m)         Chest pain denied     SOB denied     Palpitations denied     Swelling in hands/feet denied     Dizziness denied     Recent hospital stays denied     Refills denied
mildly dilated. Mitral valve: There was moderate annular calcification. There was mild  regurgitation. Regurgitation grade was 1+ on a scale of 0 to 4+. C.StressNuclear/Stress ECHO/Stress test: 1/11/19-normal Naima nuclear study. EF 43%      D. Vascular:      E. EP:      F. Miscellaneous: CMRI 1/15/2020 IMPRESSION          1. Normal left ventricular cavity size with moderate left ventricular systolic   dysfunction. Abnormal septal motion consistent with intraventricular conduction   delay. Mild global hypokinesis is present. 3-D LVEF 37%. 2. Normal right ventricular size and systolic function. RVEF 50%. 3. Mildly sclerotic aortic valve leaflet with minimal aortic stenosis. Aortic   valve area is 1.9 cm with by planimetry. 4. On EGE and LGE study, there is patchy areas of myocardial enhancement   particularly affecting the mid wall of the basal septum, base to mid inferior RV   insertion point, base to mid anterolateral wall sparing the subendocardium. The   differential diagnoses include possibility of infiltrative cardiac sarcoidosis   versus nonspecific myocardial scarring from nonischemic cardiomyopathy. No   features suggestive of cardiac amyloidosis. No features of recent or old   myocardial infarction. No features suggestive of viral myocarditis. 5. Small paratracheal and azygous esophageal lymph nodes are present. Further   dedicated evaluation of these lymph nodes is recommended given concern for   possible cardiac sarcoidosis. 6. Normal pleura and pericardium. There is no significant effusions. HISTORY OF PRESENT ILLNESS  Tierra Adams is a 80 y.o. male previously with bradycardia. Reduced coreg and tolerating well. Fells less sluggish, more awake. HR 68 at home /70s. Denies chest pain, shortness of breath, edema. Has toe coldness - recently had JULY with PCP. (GI bleed in October 2016 when his Eliquis was stopped.   As per the discussion during the admission,

## 2023-09-29 ENCOUNTER — TELEPHONE (OUTPATIENT)
Age: 88
End: 2023-09-29

## 2023-09-29 DIAGNOSIS — I42.9 CARDIOMYOPATHY, UNSPECIFIED TYPE (HCC): ICD-10-CM

## 2023-09-29 DIAGNOSIS — I10 HTN (HYPERTENSION), BENIGN: ICD-10-CM

## 2023-09-29 DIAGNOSIS — I44.7 LBBB (LEFT BUNDLE BRANCH BLOCK): ICD-10-CM

## 2023-09-29 DIAGNOSIS — R00.1 BRADYCARDIA: ICD-10-CM

## 2023-09-29 DIAGNOSIS — I50.20 HFREF (HEART FAILURE WITH REDUCED EJECTION FRACTION) (HCC): ICD-10-CM

## 2023-09-29 DIAGNOSIS — I48.0 PAF (PAROXYSMAL ATRIAL FIBRILLATION) (HCC): ICD-10-CM

## 2023-10-02 RX ORDER — LOSARTAN POTASSIUM 100 MG/1
100 TABLET ORAL DAILY
Qty: 90 TABLET | Refills: 3 | Status: SHIPPED | OUTPATIENT
Start: 2023-10-02

## 2023-10-02 NOTE — TELEPHONE ENCOUNTER
Requested Prescriptions     Signed Prescriptions Disp Refills    losartan (COZAAR) 100 MG tablet 90 tablet 3     Sig: Take 1 tablet by mouth daily     Authorizing Provider: Jacque Silver     Ordering User: Amrit Be

## 2023-10-13 ENCOUNTER — OFFICE VISIT (OUTPATIENT)
Age: 88
End: 2023-10-13
Payer: MEDICARE

## 2023-10-13 ENCOUNTER — ANCILLARY PROCEDURE (OUTPATIENT)
Age: 88
End: 2023-10-13
Payer: MEDICARE

## 2023-10-13 VITALS
WEIGHT: 189 LBS | OXYGEN SATURATION: 96 % | HEART RATE: 56 BPM | SYSTOLIC BLOOD PRESSURE: 110 MMHG | HEIGHT: 68 IN | DIASTOLIC BLOOD PRESSURE: 60 MMHG | BODY MASS INDEX: 28.64 KG/M2

## 2023-10-13 VITALS
DIASTOLIC BLOOD PRESSURE: 60 MMHG | WEIGHT: 188 LBS | BODY MASS INDEX: 28.49 KG/M2 | HEART RATE: 60 BPM | SYSTOLIC BLOOD PRESSURE: 100 MMHG | HEIGHT: 68 IN

## 2023-10-13 DIAGNOSIS — I50.20 HFREF (HEART FAILURE WITH REDUCED EJECTION FRACTION) (HCC): Primary | ICD-10-CM

## 2023-10-13 DIAGNOSIS — I44.7 LBBB (LEFT BUNDLE BRANCH BLOCK): ICD-10-CM

## 2023-10-13 DIAGNOSIS — I50.20 HFREF (HEART FAILURE WITH REDUCED EJECTION FRACTION) (HCC): ICD-10-CM

## 2023-10-13 DIAGNOSIS — I48.0 PAF (PAROXYSMAL ATRIAL FIBRILLATION) (HCC): ICD-10-CM

## 2023-10-13 PROCEDURE — 99214 OFFICE O/P EST MOD 30 MIN: CPT | Performed by: INTERNAL MEDICINE

## 2023-10-13 PROCEDURE — 93306 TTE W/DOPPLER COMPLETE: CPT | Performed by: INTERNAL MEDICINE

## 2023-10-13 PROCEDURE — 93306 TTE W/DOPPLER COMPLETE: CPT

## 2023-10-13 RX ORDER — SIMVASTATIN 20 MG
20 TABLET ORAL NIGHTLY
COMMUNITY

## 2023-10-21 LAB
ECHO AO ASC DIAM: 3.3 CM
ECHO AO ASCENDING AORTA INDEX: 1.66 CM/M2
ECHO AO ROOT DIAM: 3.8 CM
ECHO AO ROOT INDEX: 1.91 CM/M2
ECHO AV AREA PEAK VELOCITY: 3.3 CM2
ECHO AV AREA VTI: 3.2 CM2
ECHO AV AREA/BSA PEAK VELOCITY: 1.7 CM2/M2
ECHO AV AREA/BSA VTI: 1.6 CM2/M2
ECHO AV MEAN GRADIENT: 2 MMHG
ECHO AV MEAN VELOCITY: 0.7 M/S
ECHO AV PEAK GRADIENT: 4 MMHG
ECHO AV PEAK VELOCITY: 1 M/S
ECHO AV VELOCITY RATIO: 0.8
ECHO AV VTI: 18.5 CM
ECHO BSA: 2.02 M2
ECHO EST RA PRESSURE: 3 MMHG
ECHO LA DIAMETER INDEX: 2.76 CM/M2
ECHO LA DIAMETER: 5.5 CM
ECHO LA TO AORTIC ROOT RATIO: 1.45
ECHO LA VOL 2C: 84 ML (ref 18–58)
ECHO LA VOL 4C: 92 ML (ref 18–58)
ECHO LA VOL BP: 88 ML (ref 18–58)
ECHO LA VOL/BSA BIPLANE: 44 ML/M2 (ref 16–34)
ECHO LA VOLUME AREA LENGTH: 92 ML
ECHO LA VOLUME INDEX A2C: 42 ML/M2 (ref 16–34)
ECHO LA VOLUME INDEX A4C: 46 ML/M2 (ref 16–34)
ECHO LA VOLUME INDEX AREA LENGTH: 46 ML/M2 (ref 16–34)
ECHO LV EDV A2C: 114 ML
ECHO LV EDV A4C: 110 ML
ECHO LV EDV BP: 113 ML (ref 67–155)
ECHO LV EDV INDEX A4C: 55 ML/M2
ECHO LV EDV INDEX BP: 57 ML/M2
ECHO LV EDV NDEX A2C: 57 ML/M2
ECHO LV EJECTION FRACTION A2C: 45 %
ECHO LV EJECTION FRACTION A4C: 39 %
ECHO LV EJECTION FRACTION BIPLANE: 42 % (ref 55–100)
ECHO LV ESV A2C: 63 ML
ECHO LV ESV A4C: 67 ML
ECHO LV ESV BP: 65 ML (ref 22–58)
ECHO LV ESV INDEX A2C: 32 ML/M2
ECHO LV ESV INDEX A4C: 34 ML/M2
ECHO LV ESV INDEX BP: 33 ML/M2
ECHO LV FRACTIONAL SHORTENING: 21 % (ref 28–44)
ECHO LV INTERNAL DIMENSION DIASTOLE INDEX: 2.36 CM/M2
ECHO LV INTERNAL DIMENSION DIASTOLIC: 4.7 CM (ref 4.2–5.9)
ECHO LV INTERNAL DIMENSION SYSTOLIC INDEX: 1.86 CM/M2
ECHO LV INTERNAL DIMENSION SYSTOLIC: 3.7 CM
ECHO LV IVSD: 1.1 CM (ref 0.6–1)
ECHO LV MASS 2D: 175.8 G (ref 88–224)
ECHO LV MASS INDEX 2D: 88.4 G/M2 (ref 49–115)
ECHO LV POSTERIOR WALL DIASTOLIC: 1 CM (ref 0.6–1)
ECHO LV RELATIVE WALL THICKNESS RATIO: 0.43
ECHO LVOT AREA: 4.2 CM2
ECHO LVOT AV VTI INDEX: 0.79
ECHO LVOT DIAM: 2.3 CM
ECHO LVOT MEAN GRADIENT: 1 MMHG
ECHO LVOT PEAK GRADIENT: 2 MMHG
ECHO LVOT PEAK VELOCITY: 0.8 M/S
ECHO LVOT STROKE VOLUME INDEX: 30.5 ML/M2
ECHO LVOT SV: 60.6 ML
ECHO LVOT VTI: 14.6 CM
ECHO RIGHT VENTRICULAR SYSTOLIC PRESSURE (RVSP): 26 MMHG
ECHO RV FREE WALL PEAK S': 12 CM/S
ECHO RV TAPSE: 1.8 CM (ref 1.7–?)
ECHO TV REGURGITANT MAX VELOCITY: 2.38 M/S
ECHO TV REGURGITANT PEAK GRADIENT: 23 MMHG

## 2023-10-25 ENCOUNTER — TELEPHONE (OUTPATIENT)
Age: 88
End: 2023-10-25

## 2023-10-25 NOTE — TELEPHONE ENCOUNTER
[address] [address]  Outpatient Physical Therapy    Treatment Note        Date: 2022  Patient: Roselyn Adams  : 1991  ACCT #: [de-identified]  Referring Practitioner: RADHA Seymour  Call returned. Reviewed current documented doses of losartan and carvedilol. 

## 2023-10-25 NOTE — TELEPHONE ENCOUNTER
Pt's PCP Partner MD  is calling because they would like to verify the dosage for the patient's Coreg and losartan because there is some discrepancy.     [address]

## 2023-10-31 ENCOUNTER — TELEPHONE (OUTPATIENT)
Age: 88
End: 2023-10-31

## 2023-10-31 NOTE — TELEPHONE ENCOUNTER
Discussed with PCP Dr. Hakeem Conte pt's current medication list. Pt with discrepancy regarding losartan. Pt currently taking 25 mg daily, however, with cardiomyopathy will stop amlodipine 5 mg daily and increase losartan to 100 mg daily.

## 2023-11-07 ENCOUNTER — APPOINTMENT (OUTPATIENT)
Facility: HOSPITAL | Age: 88
End: 2023-11-07
Payer: MEDICARE

## 2023-11-07 ENCOUNTER — HOSPITAL ENCOUNTER (OUTPATIENT)
Facility: HOSPITAL | Age: 88
Setting detail: OBSERVATION
Discharge: HOME | End: 2023-11-07
Attending: STUDENT IN AN ORGANIZED HEALTH CARE EDUCATION/TRAINING PROGRAM | Admitting: HOSPITALIST
Payer: MEDICARE

## 2023-11-07 ENCOUNTER — TELEPHONE (OUTPATIENT)
Age: 88
End: 2023-11-07

## 2023-11-07 VITALS
OXYGEN SATURATION: 99 % | HEIGHT: 69 IN | RESPIRATION RATE: 16 BRPM | HEART RATE: 56 BPM | BODY MASS INDEX: 29.47 KG/M2 | DIASTOLIC BLOOD PRESSURE: 91 MMHG | WEIGHT: 199 LBS | SYSTOLIC BLOOD PRESSURE: 132 MMHG | TEMPERATURE: 97.9 F

## 2023-11-07 DIAGNOSIS — R07.9 CHEST PAIN, UNSPECIFIED TYPE: Primary | ICD-10-CM

## 2023-11-07 LAB
ALBUMIN SERPL-MCNC: 3.3 G/DL (ref 3.5–5)
ALBUMIN/GLOB SERPL: 0.8 (ref 1.1–2.2)
ALP SERPL-CCNC: 69 U/L (ref 45–117)
ALT SERPL-CCNC: 24 U/L (ref 12–78)
ANION GAP SERPL CALC-SCNC: 3 MMOL/L (ref 5–15)
AST SERPL-CCNC: 27 U/L (ref 15–37)
BASOPHILS # BLD: 0 K/UL (ref 0–0.1)
BASOPHILS NFR BLD: 0 % (ref 0–1)
BILIRUB SERPL-MCNC: 0.5 MG/DL (ref 0.2–1)
BUN SERPL-MCNC: 18 MG/DL (ref 6–20)
BUN/CREAT SERPL: 14 (ref 12–20)
CALCIUM SERPL-MCNC: 8.9 MG/DL (ref 8.5–10.1)
CHLORIDE SERPL-SCNC: 109 MMOL/L (ref 97–108)
CO2 SERPL-SCNC: 27 MMOL/L (ref 21–32)
CREAT SERPL-MCNC: 1.25 MG/DL (ref 0.7–1.3)
DIFFERENTIAL METHOD BLD: ABNORMAL
EOSINOPHIL # BLD: 0.2 K/UL (ref 0–0.4)
EOSINOPHIL NFR BLD: 3 % (ref 0–7)
ERYTHROCYTE [DISTWIDTH] IN BLOOD BY AUTOMATED COUNT: 14.6 % (ref 11.5–14.5)
GLOBULIN SER CALC-MCNC: 3.9 G/DL (ref 2–4)
GLUCOSE SERPL-MCNC: 109 MG/DL (ref 65–100)
HCT VFR BLD AUTO: 42.7 % (ref 36.6–50.3)
HGB BLD-MCNC: 14.1 G/DL (ref 12.1–17)
IMM GRANULOCYTES # BLD AUTO: 0 K/UL (ref 0–0.04)
IMM GRANULOCYTES NFR BLD AUTO: 1 % (ref 0–0.5)
LYMPHOCYTES # BLD: 1.9 K/UL (ref 0.8–3.5)
LYMPHOCYTES NFR BLD: 24 % (ref 12–49)
MAGNESIUM SERPL-MCNC: 2.4 MG/DL (ref 1.6–2.4)
MCH RBC QN AUTO: 29.8 PG (ref 26–34)
MCHC RBC AUTO-ENTMCNC: 33 G/DL (ref 30–36.5)
MCV RBC AUTO: 90.3 FL (ref 80–99)
MONOCYTES # BLD: 0.9 K/UL (ref 0–1)
MONOCYTES NFR BLD: 11 % (ref 5–13)
NEUTS SEG # BLD: 4.7 K/UL (ref 1.8–8)
NEUTS SEG NFR BLD: 61 % (ref 32–75)
NRBC # BLD: 0 K/UL (ref 0–0.01)
NRBC BLD-RTO: 0 PER 100 WBC
NT PRO BNP: 472 PG/ML
PLATELET # BLD AUTO: 219 K/UL (ref 150–400)
PMV BLD AUTO: 9.3 FL (ref 8.9–12.9)
POTASSIUM SERPL-SCNC: 4.3 MMOL/L (ref 3.5–5.1)
PROT SERPL-MCNC: 7.2 G/DL (ref 6.4–8.2)
RBC # BLD AUTO: 4.73 M/UL (ref 4.1–5.7)
SODIUM SERPL-SCNC: 139 MMOL/L (ref 136–145)
TROPONIN I SERPL HS-MCNC: 19 NG/L (ref 0–76)
TROPONIN I SERPL HS-MCNC: 19 NG/L (ref 0–76)
TSH SERPL DL<=0.05 MIU/L-ACNC: 1.26 UIU/ML (ref 0.36–3.74)
WBC # BLD AUTO: 7.8 K/UL (ref 4.1–11.1)

## 2023-11-07 PROCEDURE — 80053 COMPREHEN METABOLIC PANEL: CPT

## 2023-11-07 PROCEDURE — 6370000000 HC RX 637 (ALT 250 FOR IP): Performed by: STUDENT IN AN ORGANIZED HEALTH CARE EDUCATION/TRAINING PROGRAM

## 2023-11-07 PROCEDURE — 83735 ASSAY OF MAGNESIUM: CPT

## 2023-11-07 PROCEDURE — 71046 X-RAY EXAM CHEST 2 VIEWS: CPT

## 2023-11-07 PROCEDURE — 84443 ASSAY THYROID STIM HORMONE: CPT

## 2023-11-07 PROCEDURE — 93005 ELECTROCARDIOGRAM TRACING: CPT | Performed by: STUDENT IN AN ORGANIZED HEALTH CARE EDUCATION/TRAINING PROGRAM

## 2023-11-07 PROCEDURE — 99285 EMERGENCY DEPT VISIT HI MDM: CPT

## 2023-11-07 PROCEDURE — G0378 HOSPITAL OBSERVATION PER HR: HCPCS

## 2023-11-07 PROCEDURE — 36415 COLL VENOUS BLD VENIPUNCTURE: CPT

## 2023-11-07 PROCEDURE — 83880 ASSAY OF NATRIURETIC PEPTIDE: CPT

## 2023-11-07 PROCEDURE — 84484 ASSAY OF TROPONIN QUANT: CPT

## 2023-11-07 PROCEDURE — 85025 COMPLETE CBC W/AUTO DIFF WBC: CPT

## 2023-11-07 RX ORDER — CARVEDILOL 12.5 MG/1
12.5 TABLET ORAL 2 TIMES DAILY WITH MEALS
Status: DISCONTINUED | OUTPATIENT
Start: 2023-11-07 | End: 2023-11-07 | Stop reason: HOSPADM

## 2023-11-07 RX ORDER — ENOXAPARIN SODIUM 100 MG/ML
40 INJECTION SUBCUTANEOUS EVERY 24 HOURS
Status: DISCONTINUED | OUTPATIENT
Start: 2023-11-07 | End: 2023-11-07 | Stop reason: HOSPADM

## 2023-11-07 RX ORDER — ASPIRIN 81 MG/1
243 TABLET, CHEWABLE ORAL
Status: COMPLETED | OUTPATIENT
Start: 2023-11-07 | End: 2023-11-07

## 2023-11-07 RX ORDER — AMLODIPINE BESYLATE 5 MG/1
5 TABLET ORAL DAILY
Status: DISCONTINUED | OUTPATIENT
Start: 2023-11-08 | End: 2023-11-07 | Stop reason: HOSPADM

## 2023-11-07 RX ORDER — LEVOTHYROXINE SODIUM 0.05 MG/1
75 TABLET ORAL
Status: DISCONTINUED | OUTPATIENT
Start: 2023-11-08 | End: 2023-11-07 | Stop reason: HOSPADM

## 2023-11-07 RX ORDER — ASPIRIN 81 MG/1
81 TABLET, CHEWABLE ORAL DAILY
Status: DISCONTINUED | OUTPATIENT
Start: 2023-11-07 | End: 2023-11-07 | Stop reason: SDUPTHER

## 2023-11-07 RX ORDER — NITROGLYCERIN 0.4 MG/1
0.4 TABLET SUBLINGUAL EVERY 5 MIN PRN
Status: DISCONTINUED | OUTPATIENT
Start: 2023-11-07 | End: 2023-11-07 | Stop reason: HOSPADM

## 2023-11-07 RX ORDER — ATORVASTATIN CALCIUM 10 MG/1
10 TABLET, FILM COATED ORAL NIGHTLY
Status: DISCONTINUED | OUTPATIENT
Start: 2023-11-07 | End: 2023-11-07 | Stop reason: HOSPADM

## 2023-11-07 RX ORDER — MEMANTINE HYDROCHLORIDE 10 MG/1
10 TABLET ORAL 2 TIMES DAILY
Status: DISCONTINUED | OUTPATIENT
Start: 2023-11-07 | End: 2023-11-07 | Stop reason: HOSPADM

## 2023-11-07 RX ORDER — IBUPROFEN 400 MG/1
400 TABLET ORAL EVERY 8 HOURS
Status: DISCONTINUED | OUTPATIENT
Start: 2023-11-07 | End: 2023-11-07 | Stop reason: HOSPADM

## 2023-11-07 RX ORDER — ASPIRIN 81 MG/1
81 TABLET, CHEWABLE ORAL DAILY
Status: DISCONTINUED | OUTPATIENT
Start: 2023-11-08 | End: 2023-11-07 | Stop reason: HOSPADM

## 2023-11-07 RX ORDER — FAMOTIDINE 20 MG/1
20 TABLET, FILM COATED ORAL DAILY
Status: DISCONTINUED | OUTPATIENT
Start: 2023-11-08 | End: 2023-11-07 | Stop reason: HOSPADM

## 2023-11-07 RX ORDER — FLUOXETINE HYDROCHLORIDE 20 MG/1
20 CAPSULE ORAL DAILY
Status: DISCONTINUED | OUTPATIENT
Start: 2023-11-08 | End: 2023-11-07 | Stop reason: HOSPADM

## 2023-11-07 RX ORDER — PANTOPRAZOLE SODIUM 40 MG/1
40 TABLET, DELAYED RELEASE ORAL
Status: DISCONTINUED | OUTPATIENT
Start: 2023-11-08 | End: 2023-11-07 | Stop reason: HOSPADM

## 2023-11-07 RX ORDER — LOSARTAN POTASSIUM 50 MG/1
100 TABLET ORAL DAILY
Status: DISCONTINUED | OUTPATIENT
Start: 2023-11-08 | End: 2023-11-07 | Stop reason: HOSPADM

## 2023-11-07 RX ADMIN — ASPIRIN 243 MG: 81 TABLET, CHEWABLE ORAL at 13:54

## 2023-11-07 RX ADMIN — NITROGLYCERIN 0.4 MG: 0.4 TABLET, ORALLY DISINTEGRATING SUBLINGUAL at 13:55

## 2023-11-07 ASSESSMENT — ENCOUNTER SYMPTOMS
BACK PAIN: 0
SHORTNESS OF BREATH: 0
ABDOMINAL PAIN: 0

## 2023-11-07 ASSESSMENT — PAIN SCALES - GENERAL: PAINLEVEL_OUTOF10: 4

## 2023-11-07 ASSESSMENT — HEART SCORE: ECG: 1

## 2023-11-07 ASSESSMENT — PAIN - FUNCTIONAL ASSESSMENT: PAIN_FUNCTIONAL_ASSESSMENT: 0-10

## 2023-11-07 ASSESSMENT — PAIN DESCRIPTION - LOCATION: LOCATION: CHEST

## 2023-11-07 NOTE — ED NOTES
Pt and family would like to leave and follow up with cardiology outpatient.  Dr Kiran Helms aware and at bedside to speak with pt    AMA paperwork signed     Martin Berman RN  11/07/23 2660

## 2023-11-07 NOTE — ED TRIAGE NOTES
Patient with medial chest pain since 0930 after taking synthroid. This is not a new medication for him.  Denies any SOB

## 2023-11-07 NOTE — H&P
[address] [place] [address]  HISTORY AND PHYSICAL    Name:  Ortega Connors  MR#:  [id number]  :  1935  ACCOUNT #:  [de-identified]  ADMIT DATE:  2023      PRESENTING COMPLAINT:  Left-sided chest pain. HISTORY OF PRESENT ILLNESS:  The patient is an 80-year-old gentleman who has previous history significant for hypertension, history of nonsustained ventricular tachycardia, history of chronic kidney disease was brought into the emergency room because of left-sided chest pain. The patient says his pain started approximately at 1:00 a.m. last night, it is continuous. The patient describes the pain as sharp. It does not radiate. There is no exacerbating or relieving factors. The patient has previous history significant for coronary artery disease. He tells me he did not have any chest pain recently. He denies having any cough. He denies any worsening of pain with breathing. PAST MEDICAL HISTORY:  Significant for;  1.  Cardiomyopathy. 2.  History of hyperlipidemia. 3.  Hypertension. 4.  Left bundle-branch block. 5.  Mild cognitive impairment. 6.  History of paroxysmal atrial fibrillation. 7.  Vertigo. PAST SURGICAL HISTORY:  Significant for hernia surgery. CURRENT MEDICATIONS:  His current medications include;  1. Levothyroxine 75 mcg daily. 2.  Fluoxetine 20 mg daily. 3.  Coreg 12.5 mg twice daily. 4.  Memantine 10 mg twice daily. 5.  Famotidine 20 mg twice daily. 6.  Amlodipine 5 mg daily. 7.  Simvastatin 20 mg daily. SOCIOECONOMIC HISTORY:  The patient does not smoke or drink. REVIEW OF SYSTEMS:  Negative except as mentioned in history of presenting illness. All systems were reviewed. No other positive finding was noticed. PHYSICAL EXAMINATION:  GENERAL:  On examination, the patient is an 80year-old gentleman, not in any acute distress. VITAL SIGNS:  Temperature of 97.9, blood pressure 129/73, respiratory rate is 16, pulse is 68, saturation is 98%.   HEENT:

## 2023-11-07 NOTE — PROGRESS NOTES
[facility] Dosing Services    Famotidine was automatically dose-adjusted per [facility] P&T Committee Protocol, with respect to renal function. Assessment/Plan: Estimated Creatinine Clearance: 45 mL/min (based on SCr of 1.25 mg/dL). Famotidine changed to 20 mg daily per P&T approved protocol for patient with CrCl less than 50 mL/min. Creatinine Clearance Estimated Creatinine Clearance: 45 mL/min (based on SCr of 1.25 mg/dL).    BUN Lab Results   Component Value Date/Time    BUN 18 11/07/2023 01:26 PM         Pharmacist Elizabeth Allen, [facility]

## 2023-11-09 LAB
EKG DIAGNOSIS: NORMAL
EKG Q-T INTERVAL: 480 MS
EKG QRS DURATION: 146 MS
EKG QTC CALCULATION (BAZETT): 463 MS
EKG R AXIS: 24 DEGREES
EKG T AXIS: 250 DEGREES
EKG VENTRICULAR RATE: 56 BPM

## 2023-11-09 PROCEDURE — 93010 ELECTROCARDIOGRAM REPORT: CPT | Performed by: SPECIALIST

## 2023-11-17 ENCOUNTER — HOSPITAL ENCOUNTER (OUTPATIENT)
Facility: HOSPITAL | Age: 88
End: 2023-11-17
Attending: INTERNAL MEDICINE
Payer: MEDICARE

## 2023-11-17 DIAGNOSIS — R93.89 ABNORMAL CXR (CHEST X-RAY): ICD-10-CM

## 2023-11-17 PROCEDURE — 71250 CT THORAX DX C-: CPT

## 2024-02-09 NOTE — PROGRESS NOTES
[facility]  [address],[facility],[place] [address]  Office [phone],Fax [phone]                                                                                                                                       Primary Cardiologist:  Scotty Armendariz MD  Last Office Visit:          Assessment  PAF - - (was on amiodaraone - now off it ( office visit 9/18/14)- \"pt not sure - thought he was supposed to come off it\". Was not restarted.  He was on Eliquis but had a GI bleed in October 2016 (please see discussion below) and is now on aspirin.)  Bradycardia    HTN   LBBB ( old)   Hx of NSVT   Non obstructive CAD by cath 5/2014; Small  Vessel dz/non obstructive CAD 10/2019   Cardiomyopathy -EF  40%/grade 1 diastolic dysfunction.  Combination of ischemic/nonischemic.- CMRI - 37% possible sarcoidosis  CKD   Chronic dizziness - hx of BPV   \"Cool extremeties- admn 5/5/14 - seen by Dr Rose 5/12/14 - JULY - at ankle (Nml JULY 1.3 bilat with PVR) - neg u/s for DVT and VQ scan\"   (Hx of Anemia w/u 5/5/14 - Dr Sanchez - Mild antral gastritis/ Mild sigmoid diverticulosis and small internal hemorrhoids, otherwise mucosa within normal. - per pt -anemia resolved-recurrent  GI bleed October 2016.As per the discussion during the admission, patient did not want any further workup for his diverticulitis history/repeat procedures as part of the gastroenterologist note and it was recommended that because of the 25% risk of rebleeding  if Eliquis was restarted, patient would be on aspirin alone.)     Plan  Cardiomyopathy/concern for amyoidosis on echo but CMRI - possible sarcoidosis ; no evidence of amyloidosis - EF 37%   On ASA/ B Blocker/ARB /statin-not on ACE inhibitor - today reduced coreg to 6.25 mg BID for bradycardia  Compliant with CPAP   Labs reviewed 8/23/2023-, triglycerides 122, HDL 36, LDL 72, continue to 1.4, hemoglobin A1c 5.9     Aggressive CV risk factor modification  F/u

## 2024-02-13 ENCOUNTER — OFFICE VISIT (OUTPATIENT)
Age: 89
End: 2024-02-13
Payer: MEDICARE

## 2024-02-13 VITALS
BODY MASS INDEX: 29.62 KG/M2 | SYSTOLIC BLOOD PRESSURE: 122 MMHG | DIASTOLIC BLOOD PRESSURE: 60 MMHG | WEIGHT: 200 LBS | OXYGEN SATURATION: 98 % | HEIGHT: 69 IN | HEART RATE: 51 BPM

## 2024-02-13 DIAGNOSIS — I10 HTN (HYPERTENSION), BENIGN: ICD-10-CM

## 2024-02-13 DIAGNOSIS — I44.7 LBBB (LEFT BUNDLE BRANCH BLOCK): ICD-10-CM

## 2024-02-13 DIAGNOSIS — I48.0 PAF (PAROXYSMAL ATRIAL FIBRILLATION) (HCC): ICD-10-CM

## 2024-02-13 DIAGNOSIS — R00.1 BRADYCARDIA: ICD-10-CM

## 2024-02-13 DIAGNOSIS — I42.9 CARDIOMYOPATHY, UNSPECIFIED TYPE (HCC): ICD-10-CM

## 2024-02-13 DIAGNOSIS — I50.20 HFREF (HEART FAILURE WITH REDUCED EJECTION FRACTION) (HCC): Primary | ICD-10-CM

## 2024-02-13 PROCEDURE — 99214 OFFICE O/P EST MOD 30 MIN: CPT

## 2024-02-13 PROCEDURE — 1123F ACP DISCUSS/DSCN MKR DOCD: CPT

## 2024-02-13 PROCEDURE — G8419 CALC BMI OUT NRM PARAM NOF/U: HCPCS

## 2024-02-13 PROCEDURE — G8427 DOCREV CUR MEDS BY ELIG CLIN: HCPCS

## 2024-02-13 PROCEDURE — 1036F TOBACCO NON-USER: CPT

## 2024-02-13 PROCEDURE — G8484 FLU IMMUNIZE NO ADMIN: HCPCS

## 2024-02-13 RX ORDER — CARVEDILOL 6.25 MG/1
6.25 TABLET ORAL 2 TIMES DAILY WITH MEALS
Qty: 180 TABLET | Refills: 1 | Status: SHIPPED | OUTPATIENT
Start: 2024-02-13

## 2024-02-13 RX ORDER — AMOXICILLIN AND CLAVULANATE POTASSIUM 875; 125 MG/1; MG/1
1 TABLET, FILM COATED ORAL 2 TIMES DAILY
COMMUNITY
Start: 2024-02-09

## 2024-02-13 NOTE — PROGRESS NOTES
Chief Complaint   Patient presents with    Other     LBBB  HFREF  PAF     Vitals:    02/13/24 1311   BP: 122/60   Site: Left Upper Arm   Position: Sitting   Pulse: 51   SpO2: 98%   Weight: 90.7 kg (200 lb)   Height: 1.753 m (5' 9\")     Chest pain: DENIED     Recent hospital stays: DENIED     Refills: DENIED

## 2024-02-13 NOTE — PATIENT INSTRUCTIONS
Please reduce coreg to 6.25 mg, twice daily.   Please monitor blood pressure and heart rate with this change.

## 2024-03-07 RX ORDER — CARVEDILOL 6.25 MG/1
6.25 TABLET ORAL 2 TIMES DAILY
Qty: 180 TABLET | Refills: 3 | Status: SHIPPED | OUTPATIENT
Start: 2024-03-07

## 2024-06-06 NOTE — PROGRESS NOTES
Scotty Armendariz MD      [address],[facility],[place] [address]      Office [phone],Fax [phone]      Onofre Wright is a 86 y.o.  male is here for f/u visit - Afib      Primary care physician:   Diana Brewster MD         Chief complaint:   As documented in EMR      Assessment:      PAF - - (was on amiodaraone - now off it ( office visit 9/18/14)- \"pt not sure - thought he was supposed to come off it\". Was not restarted.  He was on Eliquis but had a GI bleed in October 2016 (please see discussion below) and is now on aspirin.)   HTN   LBBB ( old)   Hx of NSVT   Non obstructive CAD by cath 5/2014; Small  Vessel dz/non obstructive CAD 10/2019   Cardiomyopathy -EF  40%/grade 1 diastolic dysfunction.  Combination of ischemic/nonischemic.- CMRI - 37% possible sarcoidosis; 9/2023-echo-EF 42%  CKD   Chronic dizziness - hx of BPV   \"Cool extremeties- admn 5/5/14 - seen by Dr Rose 5/12/14 - JULY - at ankle (Nml JULY 1.3 bilat with PVR) - neg u/s for DVT and VQ scan\"   (Hx of Anemia w/u 5/5/14 - Dr Sanchez - Mild antral gastritis/ Mild sigmoid diverticulosis and small internal hemorrhoids, otherwise mucosa within normal. - per pt -anemia resolved-recurrent  GI bleed October 2016.As per the discussion during the admission, patient did not want any further workup for his diverticulitis history/repeat procedures as part of the gastroenterologist note and it was recommended that because of the 25% risk of rebleeding  if Eliquis was restarted, patient would be on aspirin alone.)                  Plan:       Cardiomyopathy/concern for amyoidosis on echo but CMRI - possible sarcoidosis ; no evidence of amyloidosis - EF 37%; echo 9/2023-EF 40%.  Clinically euvolemic.  Mild swelling lower extremities which could be secondary to venous insufficiency.  Advised compression stockings.  Currently not on diuretics.  If swelling worsens or he has other symptoms of CHF-can use diuretic.  Patient is

## 2024-06-07 ENCOUNTER — OFFICE VISIT (OUTPATIENT)
Age: 89
End: 2024-06-07
Payer: MEDICARE

## 2024-06-07 VITALS
OXYGEN SATURATION: 97 % | BODY MASS INDEX: 30.04 KG/M2 | HEIGHT: 69 IN | DIASTOLIC BLOOD PRESSURE: 70 MMHG | WEIGHT: 202.8 LBS | HEART RATE: 51 BPM | SYSTOLIC BLOOD PRESSURE: 120 MMHG

## 2024-06-07 DIAGNOSIS — I50.20 HFREF (HEART FAILURE WITH REDUCED EJECTION FRACTION) (HCC): Primary | ICD-10-CM

## 2024-06-07 DIAGNOSIS — I48.0 PAF (PAROXYSMAL ATRIAL FIBRILLATION) (HCC): ICD-10-CM

## 2024-06-07 DIAGNOSIS — I10 HTN (HYPERTENSION), BENIGN: ICD-10-CM

## 2024-06-07 DIAGNOSIS — I44.7 LBBB (LEFT BUNDLE BRANCH BLOCK): ICD-10-CM

## 2024-06-07 PROCEDURE — 1123F ACP DISCUSS/DSCN MKR DOCD: CPT | Performed by: INTERNAL MEDICINE

## 2024-06-07 PROCEDURE — G8427 DOCREV CUR MEDS BY ELIG CLIN: HCPCS | Performed by: INTERNAL MEDICINE

## 2024-06-07 PROCEDURE — 99214 OFFICE O/P EST MOD 30 MIN: CPT | Performed by: INTERNAL MEDICINE

## 2024-06-07 PROCEDURE — 1036F TOBACCO NON-USER: CPT | Performed by: INTERNAL MEDICINE

## 2024-06-07 PROCEDURE — G8419 CALC BMI OUT NRM PARAM NOF/U: HCPCS | Performed by: INTERNAL MEDICINE

## 2024-06-07 RX ORDER — CARVEDILOL 12.5 MG/1
12.5 TABLET ORAL 2 TIMES DAILY
Qty: 180 TABLET | Refills: 3
Start: 2024-06-07

## 2024-06-07 RX ORDER — ACETAMINOPHEN 160 MG
TABLET,DISINTEGRATING ORAL
COMMUNITY

## 2024-06-07 RX ORDER — AMLODIPINE BESYLATE 5 MG/1
5 TABLET ORAL DAILY
COMMUNITY

## 2024-06-07 RX ORDER — CETIRIZINE HYDROCHLORIDE 10 MG/1
10 TABLET ORAL DAILY
COMMUNITY

## 2024-06-07 NOTE — PROGRESS NOTES
Chief Complaint   Patient presents with    Atrial Fibrillation    Bradycardia    Hypertension    Other     CMP     Vitals:    06/07/24 1318   BP: 120/70   Site: Left Upper Arm   Position: Sitting   Cuff Size: Medium Adult   Pulse: 51   SpO2: 97%   Weight: 92 kg (202 lb 12.8 oz)   Height: 1.753 m (5' 9\")      /70 (Site: Left Upper Arm, Position: Sitting, Cuff Size: Medium Adult)   Pulse 51   Ht 1.753 m (5' 9\")   Wt 92 kg (202 lb 12.8 oz)   SpO2 97%   BMI 29.95 kg/m²

## 2024-11-12 NOTE — PROGRESS NOTES
[facility]  [address],[facility],[place] [address]  Office [phone],Fax [phone]                                                                                                                                       Primary Cardiologist:  Scotty Armendariz MD       Assessment  PAF - - (was on amiodaraone - now off it ( office visit 9/18/14)- \"pt not sure - thought he was supposed to come off it\". Was not restarted.  He was on Eliquis but had a GI bleed in October 2016 (please see discussion below) and is now on aspirin.)  Bradycardia    HTN   LBBB ( old)   Hx of NSVT   Non obstructive CAD by cath 5/2014; Small  Vessel dz/non obstructive CAD 10/2019   Cardiomyopathy -EF  40%/grade 1 diastolic dysfunction.  Combination of ischemic/nonischemic.- CMRI - 37% possible sarcoidosis  CKD   Chronic dizziness - hx of BPV   \"Cool extremeties- admn 5/5/14 - seen by Dr Rose 5/12/14 - JULY - at ankle (Nml JULY 1.3 bilat with PVR) - neg u/s for DVT and VQ scan\"   (Hx of Anemia w/u 5/5/14 - Dr Sanchez - Mild antral gastritis/ Mild sigmoid diverticulosis and small internal hemorrhoids, otherwise mucosa within normal. - per pt -anemia resolved-recurrent  GI bleed October 2016.As per the discussion during the admission, patient did not want any further workup for his diverticulitis history/repeat procedures as part of the gastroenterologist note and it was recommended that because of the 25% risk of rebleeding  if Eliquis was restarted, patient would be on aspirin alone.)     Plan  Cardiomyopathy/concern for amyoidosis on echo but CMRI - possible sarcoidosis ; no evidence of amyloidosis - EF 37%, echo 9/2023-EF 40%.  Clinically euvolemic.  Mild swelling lower extremities which could be secondary to venous insufficiency.  Advised compression stockings.  Currently not on diuretics.  If swelling worsens or he has other symptoms of CHF-can use diuretic.  Patient is currently in assisted living and

## 2024-11-18 ENCOUNTER — OFFICE VISIT (OUTPATIENT)
Age: 89
End: 2024-11-18
Payer: MEDICARE

## 2024-11-18 VITALS
WEIGHT: 220 LBS | HEIGHT: 69 IN | BODY MASS INDEX: 32.58 KG/M2 | OXYGEN SATURATION: 93 % | HEART RATE: 55 BPM | SYSTOLIC BLOOD PRESSURE: 120 MMHG | DIASTOLIC BLOOD PRESSURE: 70 MMHG

## 2024-11-18 DIAGNOSIS — I48.0 PAF (PAROXYSMAL ATRIAL FIBRILLATION) (HCC): ICD-10-CM

## 2024-11-18 DIAGNOSIS — I50.20 HFREF (HEART FAILURE WITH REDUCED EJECTION FRACTION) (HCC): Primary | ICD-10-CM

## 2024-11-18 DIAGNOSIS — I10 HTN (HYPERTENSION), BENIGN: ICD-10-CM

## 2024-11-18 DIAGNOSIS — R00.1 BRADYCARDIA: ICD-10-CM

## 2024-11-18 DIAGNOSIS — I44.7 LBBB (LEFT BUNDLE BRANCH BLOCK): ICD-10-CM

## 2024-11-18 PROCEDURE — G8484 FLU IMMUNIZE NO ADMIN: HCPCS

## 2024-11-18 PROCEDURE — 1123F ACP DISCUSS/DSCN MKR DOCD: CPT

## 2024-11-18 PROCEDURE — 1036F TOBACCO NON-USER: CPT

## 2024-11-18 PROCEDURE — 1159F MED LIST DOCD IN RCRD: CPT

## 2024-11-18 PROCEDURE — 99214 OFFICE O/P EST MOD 30 MIN: CPT

## 2024-11-18 PROCEDURE — 1160F RVW MEDS BY RX/DR IN RCRD: CPT

## 2024-11-18 PROCEDURE — G8427 DOCREV CUR MEDS BY ELIG CLIN: HCPCS

## 2024-11-18 PROCEDURE — G8417 CALC BMI ABV UP PARAM F/U: HCPCS

## 2024-11-18 PROCEDURE — 93010 ELECTROCARDIOGRAM REPORT: CPT

## 2024-11-18 PROCEDURE — 93005 ELECTROCARDIOGRAM TRACING: CPT

## 2024-11-18 NOTE — PROGRESS NOTES
Chief Complaint   Patient presents with    Hypertension    Atrial Fibrillation    Other     HFrEF     Vitals:    11/18/24 1126   BP: 120/70   Site: Left Upper Arm   Position: Sitting   Cuff Size: Medium Adult   Pulse: 55   SpO2: 93%   Weight: 99.8 kg (220 lb)   Height: 1.753 m (5' 9\")      /70 (Site: Left Upper Arm, Position: Sitting, Cuff Size: Medium Adult)   Pulse 55   Ht 1.753 m (5' 9\")   Wt 99.8 kg (220 lb)   SpO2 93%   BMI 32.49 kg/m²      [facility] Center[facility] [place] [facility] IS PHARMACY. UNKNOWN NAME OF PHARMACY

## (undated) DEVICE — PROCEDURE KIT FLUID MGMT CUST MAINFOLD STRL

## (undated) DEVICE — TUBING PRSS MON L60IN PVC RIG NONEXPANDING M TO FEM CONN

## (undated) DEVICE — HI-TORQUE BALANCE MIDDLEWEIGHT UNIVERSAL GUIDE WIRE .014 STRAIGHT TIP 3.0 CM X 190 CM: Brand: HI-TORQUE BALANCE MIDDLEWEIGHT UNIVERSAL

## (undated) DEVICE — TUBING PRSS MON L12IN PVC RIG NONEXPANDING M TO FEM CONN

## (undated) DEVICE — SYRINGE ANGIO 10ML RED FLAT GRP FIX M LUER CONN MEDALLION

## (undated) DEVICE — ANGIOGRAPHIC CATHETER: Brand: EXPO™

## (undated) DEVICE — PACK PROCEDURE SURG HRT CATH

## (undated) DEVICE — GUIDEWIRE VASC L185CM DIA0.014IN HYDRPHLC PTFE STR TIP

## (undated) DEVICE — PERCLOSE PROGLIDE™ SUTURE-MEDIATED CLOSURE SYSTEM: Brand: PERCLOSE PROGLIDE™

## (undated) DEVICE — CATH DIAG IMPLS AL1 6FRX100CM -- IMPULSE 16599-96

## (undated) DEVICE — ROSEN CURVED WIRE GUIDE: Brand: ROSEN

## (undated) DEVICE — CATH DIAG-D 6F MULTI PIG 155 5 -- IMPULSE 16599-302

## (undated) DEVICE — ANGIOGRAPHIC CATHETER: Brand: IMPULSE™

## (undated) DEVICE — CATH GUID COR EB40 6FR 100CM -- LAUNCHER

## (undated) DEVICE — Device: Brand: EAGLE EYE PLATINUM RX DIGITAL IVUS CATHETER

## (undated) DEVICE — PINNACLE PRECISION ACCESS SYSTEM INTRODUCER SHEATH: Brand: PINNACLE PRECISION ACCESS SYSTEM

## (undated) DEVICE — DRESSING HEMOSTATIC SFT INTVENT W/O SLT DBL WRP QUIKCLOT LF

## (undated) DEVICE — VALVE ANGIO ID0.11IN HEMSTAT MTL GUID WIRE INSRT TOOL AND